# Patient Record
Sex: FEMALE | Race: BLACK OR AFRICAN AMERICAN | NOT HISPANIC OR LATINO | Employment: OTHER | URBAN - METROPOLITAN AREA
[De-identification: names, ages, dates, MRNs, and addresses within clinical notes are randomized per-mention and may not be internally consistent; named-entity substitution may affect disease eponyms.]

---

## 2022-07-13 ENCOUNTER — HOSPITAL ENCOUNTER (EMERGENCY)
Facility: OTHER | Age: 57
Discharge: HOME | End: 2022-07-13
Attending: EMERGENCY MEDICINE
Payer: MEDICAID

## 2022-07-13 VITALS
BODY MASS INDEX: 34.86 KG/M2 | SYSTOLIC BLOOD PRESSURE: 174 MMHG | HEART RATE: 92 BPM | WEIGHT: 230 LBS | DIASTOLIC BLOOD PRESSURE: 82 MMHG | OXYGEN SATURATION: 98 % | HEIGHT: 68 IN | TEMPERATURE: 98 F | RESPIRATION RATE: 17 BRPM

## 2022-07-13 DIAGNOSIS — N83.202 CYST OF LEFT OVARY: ICD-10-CM

## 2022-07-13 DIAGNOSIS — R74.8 ELEVATED ALKALINE PHOSPHATASE LEVEL: ICD-10-CM

## 2022-07-13 DIAGNOSIS — R11.2 NAUSEA AND VOMITING, INTRACTABILITY OF VOMITING NOT SPECIFIED, UNSPECIFIED VOMITING TYPE: Primary | ICD-10-CM

## 2022-07-13 DIAGNOSIS — R16.1 SPLENOMEGALY: ICD-10-CM

## 2022-07-13 LAB
ALBUMIN SERPL BCP-MCNC: 3.7 G/DL (ref 3.5–5.2)
ALP SERPL-CCNC: 160 U/L (ref 55–135)
ALT SERPL W/O P-5'-P-CCNC: 43 U/L (ref 10–44)
ANION GAP SERPL CALC-SCNC: 17 MMOL/L (ref 8–16)
AST SERPL-CCNC: 43 U/L (ref 10–40)
BACTERIA #/AREA URNS HPF: NORMAL /HPF
BASOPHILS # BLD AUTO: 0.02 K/UL (ref 0–0.2)
BASOPHILS NFR BLD: 0.2 % (ref 0–1.9)
BILIRUB SERPL-MCNC: 1 MG/DL (ref 0.1–1)
BILIRUB UR QL STRIP: NEGATIVE
BUN SERPL-MCNC: 13 MG/DL (ref 6–20)
CALCIUM SERPL-MCNC: 9.7 MG/DL (ref 8.7–10.5)
CHLORIDE SERPL-SCNC: 105 MMOL/L (ref 95–110)
CLARITY UR: CLEAR
CO2 SERPL-SCNC: 20 MMOL/L (ref 23–29)
COLOR UR: YELLOW
CREAT SERPL-MCNC: 0.7 MG/DL (ref 0.5–1.4)
CREAT SERPL-MCNC: 0.9 MG/DL (ref 0.5–1.4)
DIFFERENTIAL METHOD: ABNORMAL
EOSINOPHIL # BLD AUTO: 0.2 K/UL (ref 0–0.5)
EOSINOPHIL NFR BLD: 1.4 % (ref 0–8)
ERYTHROCYTE [DISTWIDTH] IN BLOOD BY AUTOMATED COUNT: 13.3 % (ref 11.5–14.5)
EST. GFR  (AFRICAN AMERICAN): >60 ML/MIN/1.73 M^2
EST. GFR  (NON AFRICAN AMERICAN): >60 ML/MIN/1.73 M^2
GLUCOSE SERPL-MCNC: 108 MG/DL (ref 70–110)
GLUCOSE UR QL STRIP: NEGATIVE
HCT VFR BLD AUTO: 37.8 % (ref 37–48.5)
HGB BLD-MCNC: 12.8 G/DL (ref 12–16)
HGB UR QL STRIP: NEGATIVE
IMM GRANULOCYTES # BLD AUTO: 0.04 K/UL (ref 0–0.04)
IMM GRANULOCYTES NFR BLD AUTO: 0.4 % (ref 0–0.5)
KETONES UR QL STRIP: NEGATIVE
LEUKOCYTE ESTERASE UR QL STRIP: ABNORMAL
LIPASE SERPL-CCNC: 32 U/L (ref 4–60)
LYMPHOCYTES # BLD AUTO: 1.6 K/UL (ref 1–4.8)
LYMPHOCYTES NFR BLD: 15.1 % (ref 18–48)
MCH RBC QN AUTO: 32.5 PG (ref 27–31)
MCHC RBC AUTO-ENTMCNC: 33.9 G/DL (ref 32–36)
MCV RBC AUTO: 96 FL (ref 82–98)
MICROSCOPIC COMMENT: NORMAL
MONOCYTES # BLD AUTO: 0.4 K/UL (ref 0.3–1)
MONOCYTES NFR BLD: 3.6 % (ref 4–15)
NEUTROPHILS # BLD AUTO: 8.4 K/UL (ref 1.8–7.7)
NEUTROPHILS NFR BLD: 79.3 % (ref 38–73)
NITRITE UR QL STRIP: NEGATIVE
NRBC BLD-RTO: 0 /100 WBC
PH UR STRIP: 6 [PH] (ref 5–8)
PLATELET # BLD AUTO: 223 K/UL (ref 150–450)
PMV BLD AUTO: 11 FL (ref 9.2–12.9)
POTASSIUM SERPL-SCNC: 4.3 MMOL/L (ref 3.5–5.1)
PROT SERPL-MCNC: 8.8 G/DL (ref 6–8.4)
PROT UR QL STRIP: NEGATIVE
RBC # BLD AUTO: 3.94 M/UL (ref 4–5.4)
SAMPLE: NORMAL
SODIUM SERPL-SCNC: 142 MMOL/L (ref 136–145)
SP GR UR STRIP: 1.01 (ref 1–1.03)
URN SPEC COLLECT METH UR: ABNORMAL
UROBILINOGEN UR STRIP-ACNC: 1 EU/DL
WBC # BLD AUTO: 10.53 K/UL (ref 3.9–12.7)
WBC #/AREA URNS HPF: 5 /HPF (ref 0–5)

## 2022-07-13 PROCEDURE — 80053 COMPREHEN METABOLIC PANEL: CPT | Performed by: NURSE PRACTITIONER

## 2022-07-13 PROCEDURE — 96375 TX/PRO/DX INJ NEW DRUG ADDON: CPT

## 2022-07-13 PROCEDURE — 83690 ASSAY OF LIPASE: CPT | Performed by: NURSE PRACTITIONER

## 2022-07-13 PROCEDURE — 25000003 PHARM REV CODE 250: Performed by: NURSE PRACTITIONER

## 2022-07-13 PROCEDURE — 63600175 PHARM REV CODE 636 W HCPCS: Performed by: NURSE PRACTITIONER

## 2022-07-13 PROCEDURE — 99284 EMERGENCY DEPT VISIT MOD MDM: CPT | Mod: 25

## 2022-07-13 PROCEDURE — 85025 COMPLETE CBC W/AUTO DIFF WBC: CPT | Performed by: NURSE PRACTITIONER

## 2022-07-13 PROCEDURE — 81000 URINALYSIS NONAUTO W/SCOPE: CPT | Performed by: NURSE PRACTITIONER

## 2022-07-13 PROCEDURE — 96374 THER/PROPH/DIAG INJ IV PUSH: CPT | Mod: 59

## 2022-07-13 PROCEDURE — 96361 HYDRATE IV INFUSION ADD-ON: CPT

## 2022-07-13 PROCEDURE — 25500020 PHARM REV CODE 255: Performed by: EMERGENCY MEDICINE

## 2022-07-13 RX ORDER — ONDANSETRON 4 MG/1
4 TABLET, FILM COATED ORAL EVERY 6 HOURS
Qty: 12 TABLET | Refills: 0 | Status: SHIPPED | OUTPATIENT
Start: 2022-07-13 | End: 2022-10-19

## 2022-07-13 RX ORDER — FAMOTIDINE 10 MG/ML
20 INJECTION INTRAVENOUS
Status: COMPLETED | OUTPATIENT
Start: 2022-07-13 | End: 2022-07-13

## 2022-07-13 RX ORDER — ONDANSETRON 2 MG/ML
4 INJECTION INTRAMUSCULAR; INTRAVENOUS
Status: COMPLETED | OUTPATIENT
Start: 2022-07-13 | End: 2022-07-13

## 2022-07-13 RX ORDER — KETOROLAC TROMETHAMINE 30 MG/ML
15 INJECTION, SOLUTION INTRAMUSCULAR; INTRAVENOUS
Status: COMPLETED | OUTPATIENT
Start: 2022-07-13 | End: 2022-07-13

## 2022-07-13 RX ADMIN — IOHEXOL 100 ML: 350 INJECTION, SOLUTION INTRAVENOUS at 06:07

## 2022-07-13 RX ADMIN — KETOROLAC TROMETHAMINE 15 MG: 30 INJECTION, SOLUTION INTRAMUSCULAR; INTRAVENOUS at 06:07

## 2022-07-13 RX ADMIN — FAMOTIDINE 20 MG: 10 INJECTION INTRAVENOUS at 06:07

## 2022-07-13 RX ADMIN — SODIUM CHLORIDE 1000 ML: 0.9 INJECTION, SOLUTION INTRAVENOUS at 06:07

## 2022-07-13 RX ADMIN — ONDANSETRON 4 MG: 2 INJECTION INTRAMUSCULAR; INTRAVENOUS at 06:07

## 2022-07-13 NOTE — ED PROVIDER NOTES
Encounter Date: 7/13/2022       History     Chief Complaint   Patient presents with    Nausea    Vomiting     Pt c.o nausea and vomiting onset 2 weeks ago. Pt states knot feeling in right upper abd.  Vomit and diarrhea x 1 today.  AAO x 3 nadn skin w.d Tongue slightly dry     57 y/o female with hypothyroidism, psoriatic arthritis, Type 2 DM and HTN which presents to the ED with N/V and RUQ pain for the past 2 weeks. She initially thought it was a new medication she was prescribed but she continues to have the problem after stopping the medication. Pt denies fevers.     The history is provided by the patient.     Review of patient's allergies indicates:  No Known Allergies  No past medical history on file.  No past surgical history on file.  No family history on file.     Review of Systems   Constitutional: Negative for fever.   HENT: Negative for sore throat.    Respiratory: Negative for shortness of breath.    Cardiovascular: Negative for chest pain.   Gastrointestinal: Positive for abdominal pain, diarrhea, nausea and vomiting.   Genitourinary: Negative for dysuria.   Musculoskeletal: Negative for back pain.   Skin: Negative for rash.   Neurological: Negative for weakness.   Hematological: Does not bruise/bleed easily.   All other systems reviewed and are negative.    Physical Exam     Initial Vitals [07/13/22 1744]   BP Pulse Resp Temp SpO2   (!) 185/89 107 18 98.1 °F (36.7 °C) 98 %      MAP       --         Physical Exam    Nursing note and vitals reviewed.  Constitutional: She appears well-developed and well-nourished.   HENT:   Head: Normocephalic and atraumatic.   Eyes: Conjunctivae and EOM are normal. Pupils are equal, round, and reactive to light.   Neck:   Normal range of motion.  Cardiovascular: Normal rate, regular rhythm, normal heart sounds and intact distal pulses. Exam reveals no gallop and no friction rub.    No murmur heard.  Pulmonary/Chest: Breath sounds normal. No respiratory distress. She has  no wheezes. She has no rhonchi. She has no rales. She exhibits no tenderness.   Abdominal: Abdomen is soft. Bowel sounds are normal. She exhibits no distension and no mass. There is abdominal tenderness in the right upper quadrant. There is no rebound and no guarding.   Musculoskeletal:         General: No tenderness or edema. Normal range of motion.      Cervical back: Normal range of motion.     Neurological: She is alert and oriented to person, place, and time. She has normal strength. GCS score is 15. GCS eye subscore is 4. GCS verbal subscore is 5. GCS motor subscore is 6.   Skin: Skin is warm. Capillary refill takes less than 2 seconds.       ED Course   Procedures  Labs Reviewed   CBC W/ AUTO DIFFERENTIAL - Abnormal; Notable for the following components:       Result Value    RBC 3.94 (*)     MCH 32.5 (*)     Gran # (ANC) 8.4 (*)     Gran % 79.3 (*)     Lymph % 15.1 (*)     Mono % 3.6 (*)     All other components within normal limits   COMPREHENSIVE METABOLIC PANEL - Abnormal; Notable for the following components:    CO2 20 (*)     Total Protein 8.8 (*)     Alkaline Phosphatase 160 (*)     AST 43 (*)     Anion Gap 17 (*)     All other components within normal limits   URINALYSIS, REFLEX TO URINE CULTURE - Abnormal; Notable for the following components:    Leukocytes, UA Trace (*)     All other components within normal limits    Narrative:     Specimen Source->Urine   LIPASE   URINALYSIS MICROSCOPIC    Narrative:     Specimen Source->Urine   ISTAT CREATININE          Imaging Results          CT Abdomen Pelvis With Contrast (Final result)  Result time 07/13/22 19:06:59    Final result by Christy Ricketts MD (07/13/22 19:06:59)                 Impression:      1. No acute intra-abdominal abnormalities identified.  2. Large 10 cm cystic lesion seen within the pelvis which appears to arise from the left ovary/adnexal region.  Potential ovarian neoplastic process to be considered.  No prior studies are available  for comparison.  Ob gyn referral and future follow-up are advised.  3. Diffuse nodular contour of the liver which can be seen in the setting of cirrhosis.  Clinical correlation is needed.  4. Splenomegaly.  5. Colonic diverticulosis with no evidence of acute diverticulitis.  6. Additional findings as detailed above.      Electronically signed by: Christy Ricketts MD  Date:    07/13/2022  Time:    19:06             Narrative:    EXAMINATION:  CT ABDOMEN PELVIS WITH CONTRAST    CLINICAL HISTORY:  Nausea/vomiting;    TECHNIQUE:  Low dose axial images, sagittal and coronal reformations were obtained from the lung bases to the pubic symphysis following the IV administration of 100 mL of Omnipaque 350 .  Oral contrast was not given.    COMPARISON:  None.    FINDINGS:  The visualized portion of the heart is unremarkable.  The lung bases are clear.    There is diffuse nodular contour of the liver which can be seen with underlying cirrhosis.  Spleen is enlarged measuring 15 cm.  Portal vein and splenic vein are patent.  There is no intra-or extrahepatic biliary ductal dilatation.  Gallbladder is contracted with small calcified stones seen.  Stomach, pancreas, and adrenal glands are unremarkable.    Kidneys enhance normally with no evidence of hydronephrosis.  No abnormalities are seen along the ureteral courses.  Urinary bladder is nondistended.  Uterus is unremarkable.  Large 10 cm cystic lesion is seen within the pelvis which may arise from the left ovary.    Appendix is visualized and is unremarkable.  The visualized loops of small and large bowel show no evidence of obstruction or inflammation.  Diverticulosis is seen involving the distal descending and proximal sigmoid colon.  No free air or free fluid.    Aorta tapers normally mild atherosclerosis.    No acute osseous abnormality identified.  Mild levoscoliotic curvature is seen of the thoracolumbar spine.  Multilevel degenerative changes are seen with lower lumbar facet  arthropathy.  Small fat containing umbilical hernia is noted.                                 Medications   sodium chloride 0.9% bolus 1,000 mL (0 mLs Intravenous Stopped 7/13/22 1930)   ondansetron injection 4 mg (4 mg Intravenous Given 7/13/22 1831)   ketorolac injection 15 mg (15 mg Intravenous Given 7/13/22 1831)   famotidine (PF) injection 20 mg (20 mg Intravenous Given 7/13/22 1833)   iohexoL (OMNIPAQUE 350) injection 100 mL (100 mLs Intravenous Given 7/13/22 1854)     Medical Decision Making:   Initial Assessment:   57 y/o female with RUQ pain and N/V/D for 2 weeks. Labs and CT ordered.  Differential Diagnosis:   Gastroenteritis, gastritis, ulcer, cholecystitis, gallstones, pancreatitis, ileus, small bowel obstruction, appendicitis, constipation  Clinical Tests:   Lab Tests: Ordered and Reviewed  The following lab test(s) were unremarkable: CBC and Lipase       <> Summary of Lab: Elevated AST and alkaline phos  Radiological Study: Ordered and Reviewed  ED Management:  Pt examined and has pain with palpation to the RUQ of the abdomen. Pt given zofran, toradol and fluids. CT shows splenomegaly, possible early signs of cirrhosis and left ovarian cyst. Pt advised that the CT findings can be followed up outpatient and she agreed. Pt felt much better after the zofran and will be discharged home with zofran. A referral has been placed for gynecology and gastroenterology. She will follow up with her PCP. Patient given strict return precautions and voiced understanding of all discharge instructions. Pt was stable at discharge.                  ED Course as of 07/13/22 2009 Wed Jul 13, 2022 1821 BP(!): 185/89 [AT]   1821 Temp src: Oral [AT]   1821 Temp: 98.1 °F (36.7 °C) [AT]   1821 Pulse: 107 [AT]   1821 Resp: 18 [AT]   1821 SpO2: 98 % [AT]      ED Course User Index  [AT] MAURICIO Zee             Clinical Impression:   Final diagnoses:  [R11.2] Nausea and vomiting, intractability of vomiting not  specified, unspecified vomiting type (Primary)  [R16.1] Splenomegaly  [R74.8] Elevated alkaline phosphatase level  [N83.202] Cyst of left ovary          ED Disposition Condition    Discharge Stable        ED Prescriptions     Medication Sig Dispense Start Date End Date Auth. Provider    ondansetron (ZOFRAN) 4 MG tablet Take 1 tablet (4 mg total) by mouth every 6 (six) hours. 12 tablet 7/13/2022  MAURICIO Zee        Follow-up Information     Follow up With Specialties Details Why Contact Info    primary care provider as needed  Schedule an appointment as soon as possible for a visit in 1 week      [facility] Obstetrics and Gynecology Schedule an appointment as soon as possible for a visit   [address] [place] [id number]  [phone]    [facility] Gastroenterology Schedule an appointment as soon as possible for a visit in 1 week  [address] [place] [address]  [phone]           MAURICIO Zee  07/13/22 2009

## 2022-07-14 NOTE — DISCHARGE INSTRUCTIONS
No contact sports or climbing on ladders  If you begin to have excessive bleeding please return to the ED

## 2022-07-21 ENCOUNTER — HOSPITAL ENCOUNTER (OUTPATIENT)
Dept: RADIOLOGY | Facility: OTHER | Age: 57
Discharge: HOME | End: 2022-07-21
Attending: NURSE PRACTITIONER
Payer: MEDICAID

## 2022-07-21 DIAGNOSIS — N83.202 CYST OF LEFT OVARY: ICD-10-CM

## 2022-07-21 PROCEDURE — 76856 US PELVIS COMP WITH TRANSVAG NON-OB (XPD): ICD-10-PCS | Mod: 26,,, | Performed by: RADIOLOGY

## 2022-07-21 PROCEDURE — 76830 TRANSVAGINAL US NON-OB: CPT | Mod: TC

## 2022-07-21 PROCEDURE — 76830 TRANSVAGINAL US NON-OB: CPT | Mod: 26,,, | Performed by: RADIOLOGY

## 2022-07-21 PROCEDURE — 76830 US PELVIS COMP WITH TRANSVAG NON-OB (XPD): ICD-10-PCS | Mod: 26,,, | Performed by: RADIOLOGY

## 2022-07-21 PROCEDURE — 76856 US EXAM PELVIC COMPLETE: CPT | Mod: 26,,, | Performed by: RADIOLOGY

## 2022-08-02 ENCOUNTER — TELEPHONE (OUTPATIENT)
Dept: INTERNAL MEDICINE | Facility: CLINIC | Age: 57
End: 2022-08-02
Payer: MEDICAID

## 2022-08-02 NOTE — TELEPHONE ENCOUNTER
----- Message from Monica García sent at 8/2/2022 10:20 AM CDT -----  Contact: [phone]  Brittani from [employer] called to inquire about this pt listing the provider as her PCP. Pt was told providers panel was full, but advised that the pt says that the provider said she would accept her. Please Advise

## 2022-08-02 NOTE — TELEPHONE ENCOUNTER
Spoke with pt and was able to get her scheduled for appt on 8/10/22 for 11:30 am.    Reminder printed and mailed.

## 2022-08-09 PROBLEM — E89.0 POSTOPERATIVE HYPOTHYROIDISM: Status: ACTIVE | Noted: 2020-05-05

## 2022-08-09 PROBLEM — E66.812 CLASS 2 SEVERE OBESITY DUE TO EXCESS CALORIES WITH SERIOUS COMORBIDITY AND BODY MASS INDEX (BMI) OF 39.0 TO 39.9 IN ADULT: Status: ACTIVE | Noted: 2020-05-05

## 2022-08-09 PROBLEM — M75.51 SUBACROMIAL BURSITIS OF RIGHT SHOULDER JOINT: Status: ACTIVE | Noted: 2020-07-02

## 2022-08-09 PROBLEM — E66.01 CLASS 2 SEVERE OBESITY DUE TO EXCESS CALORIES WITH SERIOUS COMORBIDITY AND BODY MASS INDEX (BMI) OF 39.0 TO 39.9 IN ADULT: Status: ACTIVE | Noted: 2020-05-05

## 2022-08-09 PROBLEM — E11.9 TYPE 2 DIABETES MELLITUS WITHOUT COMPLICATION, WITHOUT LONG-TERM CURRENT USE OF INSULIN: Status: ACTIVE | Noted: 2020-05-05

## 2022-08-09 PROBLEM — M17.0 PRIMARY OSTEOARTHRITIS OF BOTH KNEES: Status: ACTIVE | Noted: 2020-05-05

## 2022-08-09 PROBLEM — L40.0 PLAQUE PSORIASIS: Status: ACTIVE | Noted: 2020-05-05

## 2022-08-09 RX ORDER — METHOTREXATE 2.5 MG/1
20 TABLET ORAL
COMMUNITY
Start: 2022-05-30 | End: 2022-11-23

## 2022-08-09 RX ORDER — CELECOXIB 200 MG/1
200 CAPSULE ORAL DAILY
COMMUNITY
Start: 2022-05-30 | End: 2022-11-23

## 2022-08-09 RX ORDER — FOLIC ACID 1 MG/1
1000 TABLET ORAL DAILY
COMMUNITY
Start: 2022-05-30 | End: 2023-12-19

## 2022-08-09 RX ORDER — ERGOCALCIFEROL 1.25 MG/1
50000 CAPSULE ORAL
COMMUNITY
Start: 2022-04-21 | End: 2022-11-23

## 2022-08-09 RX ORDER — METFORMIN HYDROCHLORIDE 500 MG/1
1000 TABLET, EXTENDED RELEASE ORAL 2 TIMES DAILY WITH MEALS
COMMUNITY
Start: 2022-06-08 | End: 2023-06-21

## 2022-08-09 RX ORDER — METFORMIN HYDROCHLORIDE 1000 MG/1
1000 TABLET ORAL 2 TIMES DAILY
COMMUNITY
Start: 2022-04-21 | End: 2022-08-10

## 2022-08-09 RX ORDER — ATORVASTATIN CALCIUM 40 MG/1
40 TABLET, FILM COATED ORAL DAILY
COMMUNITY
Start: 2022-06-08

## 2022-08-09 RX ORDER — SEMAGLUTIDE 1.34 MG/ML
INJECTION, SOLUTION SUBCUTANEOUS
COMMUNITY
Start: 2022-06-14 | End: 2022-10-11

## 2022-08-09 RX ORDER — ONDANSETRON 4 MG/1
TABLET, FILM COATED ORAL 2 TIMES DAILY
COMMUNITY
Start: 2022-07-13 | End: 2023-06-21

## 2022-08-09 RX ORDER — LOSARTAN POTASSIUM 100 MG/1
100 TABLET ORAL DAILY
COMMUNITY
Start: 2022-06-08 | End: 2022-09-21

## 2022-08-09 RX ORDER — LEVOTHYROXINE SODIUM 137 UG/1
137 TABLET ORAL DAILY
COMMUNITY
Start: 2022-06-08 | End: 2022-09-21 | Stop reason: SDUPTHER

## 2022-08-09 NOTE — PROGRESS NOTES
Subjective:     @Patient ID: Parris Guevara is a 57 y.o. female.    Chief Complaint: Annual Exam    HPI  58 yo F with HTN, DM2, HLD, psoriatic arthritis, Grave's disease hypothyroidism s/p partial thyroidectomy at age 9 yo, presents for annual and to est care at [facility].   She is new to me as a patient. Here to       1. DM2: last a1c 7.9 On metformin  mg bid and ozempic. Foot exam: due  Eye exam: due   Reports some nausea not related she feels to medication  2. HTN: losartan 100 mg qday.   Reports amlodpine 5 mg takes prn. States a full tablet makes her feel sick. Will take 1/2 tablet. Reports BP usually 120-130/70s home.   3. HLD: atorvastatin 40 mg qday  4. Ovarian mass: diagnosed on CT scan in ER. Also had ultrasound. Needs f/u. Has upcoming appt on  with GYN   5. Liver nodular contour, possibly cirrhosis: seen on CT scan in ER in July. Recommend f/u. Pt Reports her mother  of CARTER cirrhosis, liver cancer   5. Psoriatic arthritis: on methotrexate, folic acid 1 mg qday and Celebrex. Follows with rheumatology at [facility] Dr Thao Haynes.    6. Hypothyroidism s/p partial thyroidectomy: on synthroid 137 mcg qday  7. Obesity: pt limited with activity with hx of arthritis   8. N/V: had episode in the ER. Occurs with RUQ abdominal pain intermittently associated after eating heavy meals. CT scan on 7/15 showed small calcified gallstones         Lipid disorders/ASCVD risk (ages >/= 45 or >/= 20 if increased risk ): ordered  DM (>45y yearly or if obese, HTN): A1c ordered     Eye exam: reports done 6 months ago   Breast Cancer (40-50y discretion of pt, 50-74y every 1-2 years): Mammogram DUE   Cervical Cancer (Pap Smear ages 21-65 every 3 years or Pap + HPV q5 years after 30 years of age): DUE  Colorectal Cancer (normal risk 50-75yr): Colonoscopy DUE    DEXA (F>66 yo, M >71yo, M&F 50-70 yo with risk factors (smoking, previous fx, wt <70kg; etoh abuse, chronic steroids, RA)): n/a       Vaccines:    Influenza (yearly)    Tetanus (every 10 yrs - 1st tdap)    PPSV23(>66yo or <65 w/ lung dz, smoking, DM)   PCV13 (> 65 or <65 w/ immunocompromised)   Zoster (>61yo): defer     Covid19:  Due for booster     Exercise: no scheduled activity   Diet: regular                  Review of Systems   Constitutional: Negative for chills and fever.   HENT: Negative for congestion and sore throat.    Eyes: Negative for pain and visual disturbance.   Respiratory: Negative for cough and shortness of breath.    Cardiovascular: Negative for chest pain and leg swelling.   Gastrointestinal: Positive for nausea. Negative for abdominal pain and vomiting.   Endocrine: Negative for polydipsia and polyuria.   Genitourinary: Negative for difficulty urinating and dysuria.   Musculoskeletal: Positive for arthralgias. Negative for back pain.   Skin: Negative for rash and wound.   Neurological: Negative for dizziness, weakness and headaches.   Psychiatric/Behavioral: Negative for agitation and confusion.     Past medical history, surgical history, and family medical history reviewed and updated as appropriate.    Medications and allergies reviewed.     Objective:     There were no vitals filed for this visit.  There is no height or weight on file to calculate BMI.  Physical Exam  Vitals reviewed.   Constitutional:       General: She is not in acute distress.     Appearance: She is well-developed.   HENT:      Head: Normocephalic and atraumatic.      Right Ear: Tympanic membrane normal.      Left Ear: Tympanic membrane normal.      Mouth/Throat:      Mouth: Mucous membranes are moist.      Pharynx: No oropharyngeal exudate.   Eyes:      General:         Right eye: No discharge.         Left eye: No discharge.      Conjunctiva/sclera: Conjunctivae normal.   Neck:      Comments: Old healed thyroid scar from partial thyroidectomy  Cardiovascular:      Rate and Rhythm: Normal rate and regular rhythm.      Heart sounds: No murmur heard.    No friction rub.   Pulmonary:       Effort: Pulmonary effort is normal.      Breath sounds: Normal breath sounds.   Abdominal:      General: Bowel sounds are normal. There is no distension.      Palpations: Abdomen is soft.      Tenderness: There is no abdominal tenderness. There is no guarding.   Musculoskeletal:         General: Normal range of motion.      Cervical back: Normal range of motion and neck supple.      Right lower leg: No edema.      Left lower leg: No edema.   Lymphadenopathy:      Cervical: No cervical adenopathy.   Skin:     General: Skin is warm and dry.   Neurological:      Mental Status: She is alert and oriented to person, place, and time.   Psychiatric:         Mood and Affect: Mood normal.         Behavior: Behavior normal.         Lab Results   Component Value Date    CHOL 217 (H) 09/18/2020    TRIG 138 09/18/2020    HDL 41 09/18/2020    HGBA1C 7.9 (H) 04/28/2021       CT abdomen/pelvis 7/13/22  EXAMINATION:  CT ABDOMEN PELVIS WITH CONTRAST      CLINICAL HISTORY:  Nausea/vomiting;     TECHNIQUE:  Low dose axial images, sagittal and coronal reformations were obtained from the lung bases to the pubic symphysis following the IV administration of 100 mL of Omnipaque 350 .  Oral contrast was not given.     COMPARISON:  None.     FINDINGS:  The visualized portion of the heart is unremarkable.  The lung bases are clear.     There is diffuse nodular contour of the liver which can be seen with underlying cirrhosis.  Spleen is enlarged measuring 15 cm.  Portal vein and splenic vein are patent.  There is no intra-or extrahepatic biliary ductal dilatation.  Gallbladder is contracted with small calcified stones seen.  Stomach, pancreas, and adrenal glands are unremarkable.     Kidneys enhance normally with no evidence of hydronephrosis.  No abnormalities are seen along the ureteral courses.  Urinary bladder is nondistended.  Uterus is unremarkable.  Large 10 cm cystic lesion is seen within the pelvis which may arise from the left  ovary.     Appendix is visualized and is unremarkable.  The visualized loops of small and large bowel show no evidence of obstruction or inflammation.  Diverticulosis is seen involving the distal descending and proximal sigmoid colon.  No free air or free fluid.     Aorta tapers normally mild atherosclerosis.     No acute osseous abnormality identified.  Mild levoscoliotic curvature is seen of the thoracolumbar spine.  Multilevel degenerative changes are seen with lower lumbar facet arthropathy.  Small fat containing umbilical hernia is noted.     Impression:     1. No acute intra-abdominal abnormalities identified.  2. Large 10 cm cystic lesion seen within the pelvis which appears to arise from the left ovary/adnexal region.  Potential ovarian neoplastic process to be considered.  No prior studies are available for comparison.  Ob gyn referral and future follow-up are advised.  3. Diffuse nodular contour of the liver which can be seen in the setting of cirrhosis.  Clinical correlation is needed.  4. Splenomegaly.  5. Colonic diverticulosis with no evidence of acute diverticulitis.  6. Additional findings as detailed above.           US Pelvis 7/21/22     EXAMINATION:  US PELVIS COMP WITH TRANSVAG NON-OB (XPD)     CLINICAL HISTORY:  Unspecified ovarian cyst, left side     TECHNIQUE:  Transabdominal sonography of the pelvis was performed, followed by transvaginal sonography to better evaluate the uterus and ovaries.     COMPARISON:  None.     FINDINGS:  Uterus:     Size: 5.9 x 3.6 x 4.4 cm     The parenchyma is homogeneous.  Endometrium is mildly thickened measuring 0.6 cm.     Right ovary:     Not visualized     Left ovary:     Ovarian or adnexal cystic mass measures 8.9 cm.  Smooth, thin internal septations.  No associated vascularity on color images.     Free Fluid:     None.     Impression:     Large left ovarian or adnexal cystic mass with smooth internal septations.  Correlation with female pelvis MRI without  and with contrast may be considered in further evaluating.  Close follow-up recommended at a minimum as differential considerations include both benign and malignant neoplasm.     Mild diffuse thickening of the endometrium.  Correlation with endometrial sampling may be considered in further evaluating in this postmenopausal patient.  Assessment:     1. Annual physical exam    2. Type 2 diabetes mellitus without complication, without long-term current use of insulin    3. Hypertension associated with type 2 diabetes mellitus    4. Hyperlipidemia associated with type 2 diabetes mellitus    5. Postoperative hypothyroidism    6. Class 1 obesity due to excess calories with serious comorbidity and body mass index (BMI) of 33.0 to 33.9 in adult    7. Primary osteoarthritis of both knees    8. Psoriatic arthritis    9. Plaque psoriasis    10. Encounter for screening mammogram for breast cancer    11. Abnormal CT of liver    12. Elevated LFTs    13. Pelvic mass    14. Family history of liver cancer    15. Splenomegaly    16. Gallstones      Plan:   Tia was seen today for annual exam.    Diagnoses and all orders for this visit:    Annual physical exam  -     Comprehensive Metabolic Panel; Future  -     CBC Auto Differential; Future  -     Lipid Panel; Future  -     TSH; Future  -     Urinalysis; Future  -     Hemoglobin A1C; Future  -     Microalbumin/Creatinine Ratio, Urine; Future  -     Cancel: Hepatitis C Antibody; Future  -     Cancel: HIV 1/2 Ag/Ab (4th Gen); Future    Type 2 diabetes mellitus without complication, without long-term current use of insulin  -     Hemoglobin A1C; Future  -     Microalbumin/Creatinine Ratio, Urine; Future    Hypertension associated with type 2 diabetes mellitus  - BP elevated. Will continue losartan 100 mg qday and amlodipine 5 mg qday prn   - keep bp log and RTC in 1 month for bp check     Hyperlipidemia associated with type 2 diabetes mellitus  -     Lipid Panel; Future    Postoperative  hypothyroidism  - check tsh. Continue levothyroxine    Class 1 obesity due to excess calories with serious comorbidity and body mass index (BMI) of 33.0 to 33.9 in adult  - pt limited mobility 2/2 to arthritis. Is aware of following a healthy diet    Primary osteoarthritis of both knees  - stable. Continue to monitor     Psoriatic arthritis  -     Ambulatory referral/consult to Rheumatology; Future    Plaque psoriasis  -     Ambulatory referral/consult to Rheumatology; Future    Encounter for screening mammogram for breast cancer  -     Mammo Digital Screening Bilat w/ Jose; Future    Abnormal CT of liver  -     Ambulatory referral/consult to Hepatology; Future  -     Ambulatory referral/consult to Hepatology; Future    Elevated LFTs  -     Ambulatory referral/consult to Hepatology; Future  -     Ambulatory referral/consult to Hepatology; Future    Pelvic mass  - pt has upcoming appt on 8/30 with GYN     Family history of liver cancer  -     Ambulatory referral/consult to Hepatology; Future  -     Ambulatory referral/consult to Hepatology; Future    Splenomegaly  - no acute intervention at this time. Continue to monitor     Gallstones  - suspect symptomatic cholelithiasis. Will defer general surgery referral at this time. Revist at upcoming appt       RTC in 1 month for bp check     Reyna Duval MD  Internal Medicine    8/9/2022

## 2022-08-10 ENCOUNTER — LAB VISIT (OUTPATIENT)
Dept: LAB | Facility: HOSPITAL | Age: 57
End: 2022-08-10
Attending: HOSPITALIST
Payer: MEDICAID

## 2022-08-10 ENCOUNTER — OFFICE VISIT (OUTPATIENT)
Dept: INTERNAL MEDICINE | Facility: CLINIC | Age: 57
End: 2022-08-10
Payer: MEDICAID

## 2022-08-10 VITALS
SYSTOLIC BLOOD PRESSURE: 168 MMHG | TEMPERATURE: 98 F | HEART RATE: 88 BPM | HEIGHT: 68 IN | DIASTOLIC BLOOD PRESSURE: 80 MMHG | BODY MASS INDEX: 33.75 KG/M2 | WEIGHT: 222.69 LBS | RESPIRATION RATE: 20 BRPM | OXYGEN SATURATION: 99 %

## 2022-08-10 DIAGNOSIS — M17.0 PRIMARY OSTEOARTHRITIS OF BOTH KNEES: ICD-10-CM

## 2022-08-10 DIAGNOSIS — K80.20 GALLSTONES: ICD-10-CM

## 2022-08-10 DIAGNOSIS — E11.59 HYPERTENSION ASSOCIATED WITH TYPE 2 DIABETES MELLITUS: ICD-10-CM

## 2022-08-10 DIAGNOSIS — E66.09 CLASS 1 OBESITY DUE TO EXCESS CALORIES WITH SERIOUS COMORBIDITY AND BODY MASS INDEX (BMI) OF 33.0 TO 33.9 IN ADULT: ICD-10-CM

## 2022-08-10 DIAGNOSIS — L40.0 PLAQUE PSORIASIS: ICD-10-CM

## 2022-08-10 DIAGNOSIS — R79.89 ELEVATED LFTS: ICD-10-CM

## 2022-08-10 DIAGNOSIS — Z12.31 ENCOUNTER FOR SCREENING MAMMOGRAM FOR BREAST CANCER: ICD-10-CM

## 2022-08-10 DIAGNOSIS — R16.1 SPLENOMEGALY: ICD-10-CM

## 2022-08-10 DIAGNOSIS — E11.9 TYPE 2 DIABETES MELLITUS WITHOUT COMPLICATION, WITHOUT LONG-TERM CURRENT USE OF INSULIN: ICD-10-CM

## 2022-08-10 DIAGNOSIS — R19.00 PELVIC MASS: ICD-10-CM

## 2022-08-10 DIAGNOSIS — E11.69 HYPERLIPIDEMIA ASSOCIATED WITH TYPE 2 DIABETES MELLITUS: ICD-10-CM

## 2022-08-10 DIAGNOSIS — E89.0 POSTOPERATIVE HYPOTHYROIDISM: ICD-10-CM

## 2022-08-10 DIAGNOSIS — I15.2 HYPERTENSION ASSOCIATED WITH TYPE 2 DIABETES MELLITUS: ICD-10-CM

## 2022-08-10 DIAGNOSIS — Z00.00 ANNUAL PHYSICAL EXAM: Primary | ICD-10-CM

## 2022-08-10 DIAGNOSIS — R93.2 ABNORMAL CT OF LIVER: ICD-10-CM

## 2022-08-10 DIAGNOSIS — L40.50 PSORIATIC ARTHRITIS: ICD-10-CM

## 2022-08-10 DIAGNOSIS — Z80.0 FAMILY HISTORY OF LIVER CANCER: ICD-10-CM

## 2022-08-10 DIAGNOSIS — Z00.00 ANNUAL PHYSICAL EXAM: ICD-10-CM

## 2022-08-10 DIAGNOSIS — E78.5 HYPERLIPIDEMIA ASSOCIATED WITH TYPE 2 DIABETES MELLITUS: ICD-10-CM

## 2022-08-10 PROBLEM — E66.811 CLASS 1 OBESITY DUE TO EXCESS CALORIES WITH SERIOUS COMORBIDITY AND BODY MASS INDEX (BMI) OF 33.0 TO 33.9 IN ADULT: Status: ACTIVE | Noted: 2020-05-05

## 2022-08-10 LAB
ALBUMIN SERPL BCP-MCNC: 3.7 G/DL (ref 3.5–5.2)
ALP SERPL-CCNC: 150 U/L (ref 55–135)
ALT SERPL W/O P-5'-P-CCNC: 45 U/L (ref 10–44)
ANION GAP SERPL CALC-SCNC: 11 MMOL/L (ref 8–16)
AST SERPL-CCNC: 45 U/L (ref 10–40)
BASOPHILS # BLD AUTO: 0.06 K/UL (ref 0–0.2)
BASOPHILS NFR BLD: 0.7 % (ref 0–1.9)
BILIRUB SERPL-MCNC: 1.3 MG/DL (ref 0.1–1)
BUN SERPL-MCNC: 13 MG/DL (ref 6–20)
CALCIUM SERPL-MCNC: 9.2 MG/DL (ref 8.7–10.5)
CHLORIDE SERPL-SCNC: 107 MMOL/L (ref 95–110)
CHOLEST SERPL-MCNC: 134 MG/DL (ref 120–199)
CHOLEST/HDLC SERPL: 3.4 {RATIO} (ref 2–5)
CO2 SERPL-SCNC: 22 MMOL/L (ref 23–29)
CREAT SERPL-MCNC: 0.7 MG/DL (ref 0.5–1.4)
DIFFERENTIAL METHOD: ABNORMAL
EOSINOPHIL # BLD AUTO: 0.4 K/UL (ref 0–0.5)
EOSINOPHIL NFR BLD: 4.7 % (ref 0–8)
ERYTHROCYTE [DISTWIDTH] IN BLOOD BY AUTOMATED COUNT: 13.9 % (ref 11.5–14.5)
EST. GFR  (NO RACE VARIABLE): >60 ML/MIN/1.73 M^2
ESTIMATED AVG GLUCOSE: 117 MG/DL (ref 68–131)
GLUCOSE SERPL-MCNC: 83 MG/DL (ref 70–110)
HBA1C MFR BLD: 5.7 % (ref 4–5.6)
HCT VFR BLD AUTO: 34.8 % (ref 37–48.5)
HDLC SERPL-MCNC: 40 MG/DL (ref 40–75)
HDLC SERPL: 29.9 % (ref 20–50)
HGB BLD-MCNC: 11.3 G/DL (ref 12–16)
IMM GRANULOCYTES # BLD AUTO: 0.02 K/UL (ref 0–0.04)
IMM GRANULOCYTES NFR BLD AUTO: 0.2 % (ref 0–0.5)
LDLC SERPL CALC-MCNC: 76.8 MG/DL (ref 63–159)
LYMPHOCYTES # BLD AUTO: 2 K/UL (ref 1–4.8)
LYMPHOCYTES NFR BLD: 22.2 % (ref 18–48)
MCH RBC QN AUTO: 32.4 PG (ref 27–31)
MCHC RBC AUTO-ENTMCNC: 32.5 G/DL (ref 32–36)
MCV RBC AUTO: 100 FL (ref 82–98)
MONOCYTES # BLD AUTO: 0.3 K/UL (ref 0.3–1)
MONOCYTES NFR BLD: 3.3 % (ref 4–15)
NEUTROPHILS # BLD AUTO: 6.3 K/UL (ref 1.8–7.7)
NEUTROPHILS NFR BLD: 68.9 % (ref 38–73)
NONHDLC SERPL-MCNC: 94 MG/DL
NRBC BLD-RTO: 0 /100 WBC
PLATELET # BLD AUTO: 227 K/UL (ref 150–450)
PMV BLD AUTO: 12.1 FL (ref 9.2–12.9)
POTASSIUM SERPL-SCNC: 4.1 MMOL/L (ref 3.5–5.1)
PROT SERPL-MCNC: 7.9 G/DL (ref 6–8.4)
RBC # BLD AUTO: 3.49 M/UL (ref 4–5.4)
SODIUM SERPL-SCNC: 140 MMOL/L (ref 136–145)
T4 FREE SERPL-MCNC: 1.37 NG/DL (ref 0.71–1.51)
TRIGL SERPL-MCNC: 86 MG/DL (ref 30–150)
TSH SERPL DL<=0.005 MIU/L-ACNC: 0.3 UIU/ML (ref 0.4–4)
WBC # BLD AUTO: 9.1 K/UL (ref 3.9–12.7)

## 2022-08-10 PROCEDURE — 99386 PREV VISIT NEW AGE 40-64: CPT | Mod: S$PBB,,, | Performed by: HOSPITALIST

## 2022-08-10 PROCEDURE — 3079F DIAST BP 80-89 MM HG: CPT | Mod: CPTII,,, | Performed by: HOSPITALIST

## 2022-08-10 PROCEDURE — 84443 ASSAY THYROID STIM HORMONE: CPT | Performed by: HOSPITALIST

## 2022-08-10 PROCEDURE — 1159F MED LIST DOCD IN RCRD: CPT | Mod: CPTII,,, | Performed by: HOSPITALIST

## 2022-08-10 PROCEDURE — 1160F RVW MEDS BY RX/DR IN RCRD: CPT | Mod: CPTII,,, | Performed by: HOSPITALIST

## 2022-08-10 PROCEDURE — 4010F ACE/ARB THERAPY RXD/TAKEN: CPT | Mod: CPTII,,, | Performed by: HOSPITALIST

## 2022-08-10 PROCEDURE — 80053 COMPREHEN METABOLIC PANEL: CPT | Performed by: HOSPITALIST

## 2022-08-10 PROCEDURE — 85025 COMPLETE CBC W/AUTO DIFF WBC: CPT | Performed by: HOSPITALIST

## 2022-08-10 PROCEDURE — 99999 PR PBB SHADOW E&M-EST. PATIENT-LVL V: ICD-10-PCS | Mod: PBBFAC,,, | Performed by: HOSPITALIST

## 2022-08-10 PROCEDURE — 83036 HEMOGLOBIN GLYCOSYLATED A1C: CPT | Performed by: HOSPITALIST

## 2022-08-10 PROCEDURE — 99386 PR PREVENTIVE VISIT,NEW,40-64: ICD-10-PCS | Mod: S$PBB,,, | Performed by: HOSPITALIST

## 2022-08-10 PROCEDURE — 3077F SYST BP >= 140 MM HG: CPT | Mod: CPTII,,, | Performed by: HOSPITALIST

## 2022-08-10 PROCEDURE — 1160F PR REVIEW ALL MEDS BY PRESCRIBER/CLIN PHARMACIST DOCUMENTED: ICD-10-PCS | Mod: CPTII,,, | Performed by: HOSPITALIST

## 2022-08-10 PROCEDURE — 36415 COLL VENOUS BLD VENIPUNCTURE: CPT | Mod: PO | Performed by: HOSPITALIST

## 2022-08-10 PROCEDURE — 1159F PR MEDICATION LIST DOCUMENTED IN MEDICAL RECORD: ICD-10-PCS | Mod: CPTII,,, | Performed by: HOSPITALIST

## 2022-08-10 PROCEDURE — 99215 OFFICE O/P EST HI 40 MIN: CPT | Mod: PBBFAC,PO | Performed by: HOSPITALIST

## 2022-08-10 PROCEDURE — 3077F PR MOST RECENT SYSTOLIC BLOOD PRESSURE >= 140 MM HG: ICD-10-PCS | Mod: CPTII,,, | Performed by: HOSPITALIST

## 2022-08-10 PROCEDURE — 3079F PR MOST RECENT DIASTOLIC BLOOD PRESSURE 80-89 MM HG: ICD-10-PCS | Mod: CPTII,,, | Performed by: HOSPITALIST

## 2022-08-10 PROCEDURE — 99999 PR PBB SHADOW E&M-EST. PATIENT-LVL V: CPT | Mod: PBBFAC,,, | Performed by: HOSPITALIST

## 2022-08-10 PROCEDURE — 84439 ASSAY OF FREE THYROXINE: CPT | Performed by: HOSPITALIST

## 2022-08-10 PROCEDURE — 4010F PR ACE/ARB THEARPY RXD/TAKEN: ICD-10-PCS | Mod: CPTII,,, | Performed by: HOSPITALIST

## 2022-08-10 PROCEDURE — 80061 LIPID PANEL: CPT | Performed by: HOSPITALIST

## 2022-08-10 PROCEDURE — 3008F PR BODY MASS INDEX (BMI) DOCUMENTED: ICD-10-PCS | Mod: CPTII,,, | Performed by: HOSPITALIST

## 2022-08-10 PROCEDURE — 3008F BODY MASS INDEX DOCD: CPT | Mod: CPTII,,, | Performed by: HOSPITALIST

## 2022-08-11 ENCOUNTER — TELEPHONE (OUTPATIENT)
Dept: INTERNAL MEDICINE | Facility: CLINIC | Age: 57
End: 2022-08-11
Payer: MEDICAID

## 2022-08-11 NOTE — TELEPHONE ENCOUNTER
----- Message from Adry Lester sent at 8/11/2022  8:31 AM CDT -----  Regarding: returned call  Contact: patient [phone]  Patient is returning a phone call.  Who left a message for the patient: Carlee HUGHES  Does patient know what this is regarding:    Would you like a call back, or a response through your [facility] portal?:   Please call  Comments:

## 2022-08-30 ENCOUNTER — OFFICE VISIT (OUTPATIENT)
Dept: OBSTETRICS AND GYNECOLOGY | Facility: CLINIC | Age: 57
End: 2022-08-30
Payer: MEDICAID

## 2022-08-30 ENCOUNTER — TELEPHONE (OUTPATIENT)
Dept: GYNECOLOGIC ONCOLOGY | Facility: CLINIC | Age: 57
End: 2022-08-30
Payer: MEDICAID

## 2022-08-30 VITALS
WEIGHT: 223.56 LBS | BODY MASS INDEX: 33.88 KG/M2 | SYSTOLIC BLOOD PRESSURE: 156 MMHG | DIASTOLIC BLOOD PRESSURE: 90 MMHG | HEIGHT: 68 IN

## 2022-08-30 DIAGNOSIS — Z11.51 ENCOUNTER FOR SCREENING FOR HUMAN PAPILLOMAVIRUS (HPV): ICD-10-CM

## 2022-08-30 DIAGNOSIS — N83.8 OVARIAN MASS: ICD-10-CM

## 2022-08-30 DIAGNOSIS — N83.202 CYST OF LEFT OVARY: ICD-10-CM

## 2022-08-30 DIAGNOSIS — N83.202 CYST OF LEFT OVARY: Primary | ICD-10-CM

## 2022-08-30 DIAGNOSIS — Z12.4 CERVICAL CANCER SCREENING: ICD-10-CM

## 2022-08-30 DIAGNOSIS — Z01.419 WELL WOMAN EXAM WITH ROUTINE GYNECOLOGICAL EXAM: Primary | ICD-10-CM

## 2022-08-30 PROCEDURE — 1159F MED LIST DOCD IN RCRD: CPT | Mod: CPTII,,,

## 2022-08-30 PROCEDURE — 88175 CYTOPATH C/V AUTO FLUID REDO: CPT

## 2022-08-30 PROCEDURE — 3077F SYST BP >= 140 MM HG: CPT | Mod: CPTII,,,

## 2022-08-30 PROCEDURE — 99999 PR PBB SHADOW E&M-EST. PATIENT-LVL III: ICD-10-PCS | Mod: PBBFAC,,,

## 2022-08-30 PROCEDURE — 1160F RVW MEDS BY RX/DR IN RCRD: CPT | Mod: CPTII,,,

## 2022-08-30 PROCEDURE — 1159F PR MEDICATION LIST DOCUMENTED IN MEDICAL RECORD: ICD-10-PCS | Mod: CPTII,,,

## 2022-08-30 PROCEDURE — 99999 PR PBB SHADOW E&M-EST. PATIENT-LVL III: CPT | Mod: PBBFAC,,,

## 2022-08-30 PROCEDURE — 3080F PR MOST RECENT DIASTOLIC BLOOD PRESSURE >= 90 MM HG: ICD-10-PCS | Mod: CPTII,,,

## 2022-08-30 PROCEDURE — 3077F PR MOST RECENT SYSTOLIC BLOOD PRESSURE >= 140 MM HG: ICD-10-PCS | Mod: CPTII,,,

## 2022-08-30 PROCEDURE — 99213 OFFICE O/P EST LOW 20 MIN: CPT | Mod: PBBFAC

## 2022-08-30 PROCEDURE — 3080F DIAST BP >= 90 MM HG: CPT | Mod: CPTII,,,

## 2022-08-30 PROCEDURE — 1160F PR REVIEW ALL MEDS BY PRESCRIBER/CLIN PHARMACIST DOCUMENTED: ICD-10-PCS | Mod: CPTII,,,

## 2022-08-30 PROCEDURE — 87624 HPV HI-RISK TYP POOLED RSLT: CPT

## 2022-08-30 PROCEDURE — 99386 PR PREVENTIVE VISIT,NEW,40-64: ICD-10-PCS | Mod: S$PBB,,,

## 2022-08-30 PROCEDURE — 99386 PREV VISIT NEW AGE 40-64: CPT | Mod: S$PBB,,,

## 2022-08-30 PROCEDURE — 3008F BODY MASS INDEX DOCD: CPT | Mod: CPTII,,,

## 2022-08-30 PROCEDURE — 3008F PR BODY MASS INDEX (BMI) DOCUMENTED: ICD-10-PCS | Mod: CPTII,,,

## 2022-08-30 RX ORDER — METHOTREXATE 2.5 MG/1
TABLET ORAL
COMMUNITY
End: 2022-11-23

## 2022-08-30 RX ORDER — LEVOTHYROXINE SODIUM 25 UG/1
25 TABLET ORAL
COMMUNITY
End: 2022-09-21

## 2022-08-30 RX ORDER — ERGOCALCIFEROL 1.25 MG/1
50000 CAPSULE ORAL
COMMUNITY

## 2022-08-30 RX ORDER — LOSARTAN POTASSIUM 100 MG/1
100 TABLET ORAL DAILY
COMMUNITY
End: 2022-09-21

## 2022-08-30 RX ORDER — ATORVASTATIN CALCIUM 20 MG/1
20 TABLET, FILM COATED ORAL DAILY
COMMUNITY
End: 2022-11-23

## 2022-08-30 RX ORDER — FOLIC ACID 1 MG/1
1 TABLET ORAL DAILY
COMMUNITY
End: 2022-11-10 | Stop reason: SDUPTHER

## 2022-08-30 RX ORDER — SEMAGLUTIDE 1.34 MG/ML
INJECTION, SOLUTION SUBCUTANEOUS
COMMUNITY
End: 2022-10-11 | Stop reason: SDUPTHER

## 2022-08-30 RX ORDER — METFORMIN HYDROCHLORIDE 1000 MG/1
1000 TABLET ORAL 2 TIMES DAILY WITH MEALS
COMMUNITY
End: 2022-11-10 | Stop reason: SDUPTHER

## 2022-08-30 NOTE — PROGRESS NOTES
CC: Annual    HPI: Pt is a 57 y.o.  female who presents for routine annual exam and left ovarian cyst/mass. Pt states that she stopped getting periods about 10 years ago. Denies vaginal bleeding since then. Has hx of one vaginal delivery. Is not currently sexually active. She does not want STD screening. Pt went to ED in July for nausea, vomiting and RUQ pain. CT scan subsequently found a left ovarian mass. Pelvic ultrasound showed ovarian or adnexal cystic mass measures 8.9 cm. Pt denies pain at this time. Has appointment with rheumatology, hepatology and GI coming up.     FH:   Breast cancer: sister,  at age 49 and maternal cousin   Colon cancer: none  Ovarian cancer: none  Uterine cancer: none    Last pap smear: unknown, she thinks over 5 years ago  History of abnormal pap smears: none  Colonoscopy: has appointment in September   Mammogram: scheduled for September   STD history: none  Birth control: none  OB history:   Tobacco use: none     ROS:  GENERAL: Feeling well overall. Denies fever or chills.   SKIN: Denies rash or lesions.   HEAD: Denies head injury or headache.   NODES: Denies enlarged lymph nodes.   CHEST: Denies chest pain or shortness of breath.   CARDIOVASCULAR: Denies palpitations or left sided chest pain.   ABDOMEN: No abdominal pain, constipation, diarrhea, vomiting or rectal bleeding.   URINARY: No dysuria, hematuria, or burning on urination.  REPRODUCTIVE: See HPI.   BREASTS: Denies pain, lumps, or nipple discharge.   HEMATOLOGIC: No easy bruisability or excessive bleeding.   MUSCULOSKELETAL: Reports arthritis   NEUROLOGIC: Denies syncope or weakness.   PSYCHIATRIC: Denies depression, anxiety or mood swings.    PE: Chaperone present   APPEARANCE: Well nourished, well developed, Black or  female in no acute distress.  NODES: no cervical, supraclavicular, or inguinal lymphadenopathy  BREASTS: Symmetrical, no skin changes or visible lesions. No palpable masses, nipple  discharge or adenopathy bilaterally.  ABDOMEN: Soft. No tenderness or masses. No distention. No hernias palpated.   VULVA: No lesions. Normal external female genitalia.  URETHRAL MEATUS: Normal size and location, no lesions, no prolapse.  URETHRA: No masses, tenderness, or prolapse.  VAGINA: Moist. No lesions or lacerations noted. No abnormal discharge present. No odor present.   CERVIX: No lesions or discharge. No cervical motion tenderness.   UTERUS: Normal size, regular shape, mobile, non-tender.  ADNEXA: No tenderness.   ANUS PERINEUM: Normal.      Diagnosis:  1. Well woman exam with routine gynecological exam    2. Cyst of left ovary    3. Cervical cancer screening    4. Encounter for screening for human papillomavirus (HPV)    5. Ovarian mass        Plan:     Orders Placed This Encounter    HPV High Risk Genotypes, PCR    US Pelvis Comp with Transvag NON-OB (xpd    Ambulatory referral/consult to Gynecologic Oncology    Liquid-Based Pap Smear, Screening     - Pap and HPV updated   - Refer to GYN ONC for ovarian mass   - Repeat U/S ordered   - Pt interested in genetic testing due to family hx of breast cancer, reached out to Abiola García RN to get patient scheduled     Patient was counseled today on the new ACS guidelines for cervical cytology screening as well as the current recommendations for breast cancer screening. She was counseled to follow up with her PCP for other routine health maintenance. Counseling session lasted approximately 10 minutes, and all her questions were answered.  For women over the age of 65, you can stop having cervical cancer screenings if you have never had abnormal cervical cells or cervical cancer, and youve had three negative Pap tests in a row. (You also can stop screening if youve had two negative Pap and HPV tests in a row in the past 10 years, with at least one test in the past 5 years.    Follow-up with me PRN results, in 1 year for routine exam; pap in 3 years.      Lucinda Renteria, NP-C  Obstetrics & Gynecology

## 2022-08-30 NOTE — TELEPHONE ENCOUNTER
----- Message from Lucinda Renteria NP sent at 8/30/2022  2:10 PM CDT -----  Celeste Culver,    I saw this patient today who would be interested in genetic testing for breast cancer. She has a sister that passed away from breast cancer. She has not had genetic testing done.     Thanks,  Lucinda Renteria

## 2022-08-31 ENCOUNTER — TELEPHONE (OUTPATIENT)
Dept: GYNECOLOGIC ONCOLOGY | Facility: CLINIC | Age: 57
End: 2022-08-31
Payer: MEDICAID

## 2022-08-31 NOTE — TELEPHONE ENCOUNTER
Spoke with our patient about her insurance, schedule appointment she voiced understanding of the date, time and location. All questions answered Provider Scheduling Coord.  Gynecologic Oncology MA/PAR /Preceptor Aubrey Spann

## 2022-08-31 NOTE — TELEPHONE ENCOUNTER
----- Message from Gena Lara NP sent at 8/30/2022  3:21 PM CDT -----  Aubrey  Ms. Dias being referred for pelvic mass. I've ordered a  if you don't mind contacting her to schedule lab and consult with our next doc available.  Thank you,  Gena    ----- Message -----  From: Lucinda Renteria NP  Sent: 8/30/2022   2:05 PM CDT  To: CHAPINCITO Naidu,    I saw this patient today for an ovarian cyst/mass. She had a pelvic/ vag ultrasound in the ED in July showing a left ovarian or adnexal cystic mass measures 8.9 cm. I saw her as an annual visit today and am referring her to you all. I went ahead and ordered another pelvic/vag ultrasound. Is that appropriate or do you think there is anything else I should order? I wanted to reach out to you so she can get an appointment sooner rather than later.     Thanks!!  Lucinda Renteria

## 2022-09-02 ENCOUNTER — LAB VISIT (OUTPATIENT)
Dept: LAB | Facility: OTHER | Age: 57
End: 2022-09-02
Attending: NURSE PRACTITIONER
Payer: MEDICAID

## 2022-09-02 ENCOUNTER — TELEPHONE (OUTPATIENT)
Dept: GYNECOLOGIC ONCOLOGY | Facility: CLINIC | Age: 57
End: 2022-09-02

## 2022-09-02 ENCOUNTER — OFFICE VISIT (OUTPATIENT)
Dept: GYNECOLOGIC ONCOLOGY | Facility: CLINIC | Age: 57
End: 2022-09-02
Payer: MEDICAID

## 2022-09-02 ENCOUNTER — PATIENT MESSAGE (OUTPATIENT)
Dept: GYNECOLOGIC ONCOLOGY | Facility: CLINIC | Age: 57
End: 2022-09-02

## 2022-09-02 VITALS
HEIGHT: 67 IN | WEIGHT: 222.69 LBS | SYSTOLIC BLOOD PRESSURE: 178 MMHG | HEART RATE: 94 BPM | DIASTOLIC BLOOD PRESSURE: 96 MMHG | BODY MASS INDEX: 34.95 KG/M2

## 2022-09-02 DIAGNOSIS — N83.202 CYST OF LEFT OVARY: ICD-10-CM

## 2022-09-02 DIAGNOSIS — L40.0 PLAQUE PSORIASIS: ICD-10-CM

## 2022-09-02 DIAGNOSIS — N94.89 ADNEXAL MASS: Primary | ICD-10-CM

## 2022-09-02 DIAGNOSIS — Z80.3 FAMILY HISTORY OF BREAST CANCER: ICD-10-CM

## 2022-09-02 PROCEDURE — 99999 PR PBB SHADOW E&M-EST. PATIENT-LVL V: ICD-10-PCS | Mod: PBBFAC,,, | Performed by: OBSTETRICS & GYNECOLOGY

## 2022-09-02 PROCEDURE — 1159F PR MEDICATION LIST DOCUMENTED IN MEDICAL RECORD: ICD-10-PCS | Mod: CPTII,,, | Performed by: OBSTETRICS & GYNECOLOGY

## 2022-09-02 PROCEDURE — 3008F BODY MASS INDEX DOCD: CPT | Mod: CPTII,,, | Performed by: OBSTETRICS & GYNECOLOGY

## 2022-09-02 PROCEDURE — 36415 COLL VENOUS BLD VENIPUNCTURE: CPT | Performed by: NURSE PRACTITIONER

## 2022-09-02 PROCEDURE — 99215 OFFICE O/P EST HI 40 MIN: CPT | Mod: PBBFAC | Performed by: OBSTETRICS & GYNECOLOGY

## 2022-09-02 PROCEDURE — 1159F MED LIST DOCD IN RCRD: CPT | Mod: CPTII,,, | Performed by: OBSTETRICS & GYNECOLOGY

## 2022-09-02 PROCEDURE — 3080F DIAST BP >= 90 MM HG: CPT | Mod: CPTII,,, | Performed by: OBSTETRICS & GYNECOLOGY

## 2022-09-02 PROCEDURE — 3008F PR BODY MASS INDEX (BMI) DOCUMENTED: ICD-10-PCS | Mod: CPTII,,, | Performed by: OBSTETRICS & GYNECOLOGY

## 2022-09-02 PROCEDURE — 3077F SYST BP >= 140 MM HG: CPT | Mod: CPTII,,, | Performed by: OBSTETRICS & GYNECOLOGY

## 2022-09-02 PROCEDURE — 3077F PR MOST RECENT SYSTOLIC BLOOD PRESSURE >= 140 MM HG: ICD-10-PCS | Mod: CPTII,,, | Performed by: OBSTETRICS & GYNECOLOGY

## 2022-09-02 PROCEDURE — 99999 PR PBB SHADOW E&M-EST. PATIENT-LVL V: CPT | Mod: PBBFAC,,, | Performed by: OBSTETRICS & GYNECOLOGY

## 2022-09-02 PROCEDURE — 86304 IMMUNOASSAY TUMOR CA 125: CPT | Performed by: NURSE PRACTITIONER

## 2022-09-02 PROCEDURE — 99215 PR OFFICE/OUTPT VISIT, EST, LEVL V, 40-54 MIN: ICD-10-PCS | Mod: S$PBB,,, | Performed by: OBSTETRICS & GYNECOLOGY

## 2022-09-02 PROCEDURE — 99215 OFFICE O/P EST HI 40 MIN: CPT | Mod: S$PBB,,, | Performed by: OBSTETRICS & GYNECOLOGY

## 2022-09-02 PROCEDURE — 3080F PR MOST RECENT DIASTOLIC BLOOD PRESSURE >= 90 MM HG: ICD-10-PCS | Mod: CPTII,,, | Performed by: OBSTETRICS & GYNECOLOGY

## 2022-09-02 RX ORDER — LIDOCAINE HYDROCHLORIDE 10 MG/ML
1 INJECTION, SOLUTION EPIDURAL; INFILTRATION; INTRACAUDAL; PERINEURAL ONCE
Status: CANCELLED | OUTPATIENT
Start: 2022-09-02 | End: 2022-09-02

## 2022-09-02 RX ORDER — CELECOXIB 100 MG/1
200 CAPSULE ORAL ONCE
Status: CANCELLED | OUTPATIENT
Start: 2022-09-02 | End: 2022-09-02

## 2022-09-02 RX ORDER — HEPARIN SODIUM 5000 [USP'U]/ML
5000 INJECTION, SOLUTION INTRAVENOUS; SUBCUTANEOUS ONCE
Status: CANCELLED | OUTPATIENT
Start: 2022-09-02 | End: 2022-09-02

## 2022-09-02 RX ORDER — GABAPENTIN 100 MG/1
300 CAPSULE ORAL ONCE
Status: CANCELLED | OUTPATIENT
Start: 2022-09-02 | End: 2022-09-02

## 2022-09-02 RX ORDER — ACETAMINOPHEN 325 MG/1
1000 TABLET ORAL ONCE
Status: CANCELLED | OUTPATIENT
Start: 2022-09-02 | End: 2022-09-02

## 2022-09-02 NOTE — TELEPHONE ENCOUNTER
"----- Message from Sridhar Reyes sent at 9/2/2022  3:18 PM CDT -----  Consult/Advisory:          Name Of Caller: Self      Contact Preference?: [phone]       Provider Name: Leeroy      Does patient feel the need to be seen today? No      What is the nature of the call?: Returning call to office.          Additional Notes:  "Thank you for all that you do for our patients"       "

## 2022-09-02 NOTE — H&P (VIEW-ONLY)
REFERRING PROVIDER  Lucinda Renteria NP     HISTORY OF PRESENT CONDITION  Chief complaint: left ovarian mass  Tia MARIAELENA Dias is a 57 y.o. who presents in consultation for an opinion regarding a left ovarian mass with a CA-125 of ?.     +PO. -N/V. +Flatus. +BM. -VB, VD, changes in stool or urine. -Abdominal or pelvic pain. -Unintentional weight loss.      REVIEW OF SYSTEMS  All systems reviewed and negative except as noted in HPI.    OBJECTIVE   Vitals:    09/02/22 1023   BP: (!) 178/96   Pulse: 94      Body mass index is 34.87 kg/m².      1. General: Well appearing, no apparent distress, alert and oriented.  2. Lymph: Neck symmetric without cervical or supraclavicular adenopathy or mass.  3. Lungs: Normal respiratory rate, no accessory muscle use.  4. Cardiac: Normal rate  5. Psych: Normal affect.  6. Abdomen:  non-distended, soft, non-tender, are no masses, no ascites, no hepatosplenomegaly.  7. Skin: Warm, dry, no rashes or lesions.   8. Extremities: Bilateral lower extremities without edema or tenderness.  9. Genitourinary: Deferred                  ECOG status: 1    LABORATORY DATA  Lab data reviewed.    RADIOLOGICAL DATA  Radiology data reviewed.    PATHOLOGY DATA  Pathology data reviewed.    ASSESSMENT / PLAN     1. Ovarian mass       8/10/22: A1c = 5.7, TSH = 0.3, T4 = 1.37  7/21/22: Pelvic US: 5 cm, L complex adnexal mass(9 cm)  7/13/22: Hb 12.8, Plt 223, Cr 0.9, AST 43  7/13/22: CT A/P w/: No omental caking, lymphadenopathy, or ascites    F/U Rheumatology (about MTX)  F/U Anesthesia    I discussed with the patient that the primary treatment for an adnexal mass is observation versus surgical management.  Surgical management will depend on the use of frozen pathology.  Differential diagnoses includes a benign, borderline, or malignant mass.   This more than likely is a benign but due to multiple septations it is by definition of a complex mass.  I will plan to perform this in a robotic fashion.  I discussed  with the patient the role of staging for borderline frozen pathology and debulking for ovarian cancer.  This may include a total abdominal hysterectomy, bilateral salpingo-oophorectomy, omentectomy, diaphragm stripping, bowel resection, splenectomy, and any other necessary procedures to remove all visible disease. We discussed that a temporary or permanent colostomy or ileostomy may be necessary based on surgical factors.The procedure and its risks and benefits were discussed in detail.      PATIENT EDUCATION  Ready to learn, no apparent learning barriers were identified; learning preferences include listening.  Explained diagnosis and treatment plan; patient expressed understanding of the content.    INFORMED CONSENT  Discussed the risks, benefits, and alternatives of the procedure and of possible blood transfusion.  Discussed the necessity of other members of the healthcare team participating in the procedure.  All questions answered and consent given.      Silvestre Umaña

## 2022-09-03 LAB — CANCER AG125 SERPL-ACNC: 30 U/ML (ref 0–30)

## 2022-09-06 ENCOUNTER — PATIENT MESSAGE (OUTPATIENT)
Dept: GYNECOLOGIC ONCOLOGY | Facility: CLINIC | Age: 57
End: 2022-09-06
Payer: MEDICAID

## 2022-09-06 LAB
FINAL PATHOLOGIC DIAGNOSIS: NORMAL
Lab: NORMAL

## 2022-09-06 RX ORDER — GABAPENTIN 100 MG/1
300 CAPSULE ORAL ONCE
Status: CANCELLED | OUTPATIENT
Start: 2022-09-06 | End: 2022-09-06

## 2022-09-06 RX ORDER — LIDOCAINE HYDROCHLORIDE 10 MG/ML
1 INJECTION, SOLUTION EPIDURAL; INFILTRATION; INTRACAUDAL; PERINEURAL ONCE
Status: CANCELLED | OUTPATIENT
Start: 2022-09-06 | End: 2022-09-06

## 2022-09-06 RX ORDER — CELECOXIB 100 MG/1
200 CAPSULE ORAL ONCE
Status: CANCELLED | OUTPATIENT
Start: 2022-09-06 | End: 2022-09-06

## 2022-09-06 RX ORDER — ACETAMINOPHEN 325 MG/1
1000 TABLET ORAL ONCE
Status: CANCELLED | OUTPATIENT
Start: 2022-09-06 | End: 2022-09-06

## 2022-09-06 RX ORDER — HEPARIN SODIUM 5000 [USP'U]/ML
5000 INJECTION, SOLUTION INTRAVENOUS; SUBCUTANEOUS ONCE
Status: CANCELLED | OUTPATIENT
Start: 2022-09-06 | End: 2022-09-06

## 2022-09-07 ENCOUNTER — HOSPITAL ENCOUNTER (OUTPATIENT)
Dept: PREADMISSION TESTING | Facility: OTHER | Age: 57
Discharge: HOME | End: 2022-09-07
Attending: ANESTHESIOLOGY
Payer: MEDICAID

## 2022-09-07 ENCOUNTER — ANESTHESIA EVENT (OUTPATIENT)
Dept: SURGERY | Facility: OTHER | Age: 57
End: 2022-09-07
Payer: MEDICAID

## 2022-09-07 VITALS
SYSTOLIC BLOOD PRESSURE: 179 MMHG | TEMPERATURE: 98 F | DIASTOLIC BLOOD PRESSURE: 94 MMHG | BODY MASS INDEX: 34.8 KG/M2 | HEART RATE: 96 BPM | WEIGHT: 221.69 LBS | OXYGEN SATURATION: 99 % | HEIGHT: 67 IN

## 2022-09-07 DIAGNOSIS — Z01.818 PRE-OP TESTING: Primary | ICD-10-CM

## 2022-09-07 LAB
ABO + RH BLD: NORMAL
BASOPHILS # BLD AUTO: 0.04 K/UL (ref 0–0.2)
BASOPHILS NFR BLD: 0.5 % (ref 0–1.9)
BLD GP AB SCN CELLS X3 SERPL QL: NORMAL
DIFFERENTIAL METHOD: ABNORMAL
EOSINOPHIL # BLD AUTO: 0.2 K/UL (ref 0–0.5)
EOSINOPHIL NFR BLD: 2.3 % (ref 0–8)
ERYTHROCYTE [DISTWIDTH] IN BLOOD BY AUTOMATED COUNT: 14 % (ref 11.5–14.5)
HCT VFR BLD AUTO: 33.2 % (ref 37–48.5)
HGB BLD-MCNC: 11.4 G/DL (ref 12–16)
IMM GRANULOCYTES # BLD AUTO: 0.02 K/UL (ref 0–0.04)
IMM GRANULOCYTES NFR BLD AUTO: 0.3 % (ref 0–0.5)
LYMPHOCYTES # BLD AUTO: 1.6 K/UL (ref 1–4.8)
LYMPHOCYTES NFR BLD: 19.9 % (ref 18–48)
MCH RBC QN AUTO: 33.2 PG (ref 27–31)
MCHC RBC AUTO-ENTMCNC: 34.3 G/DL (ref 32–36)
MCV RBC AUTO: 97 FL (ref 82–98)
MONOCYTES # BLD AUTO: 0.7 K/UL (ref 0.3–1)
MONOCYTES NFR BLD: 8.4 % (ref 4–15)
NEUTROPHILS # BLD AUTO: 5.5 K/UL (ref 1.8–7.7)
NEUTROPHILS NFR BLD: 68.6 % (ref 38–73)
NRBC BLD-RTO: 0 /100 WBC
PLATELET # BLD AUTO: 181 K/UL (ref 150–450)
PMV BLD AUTO: 11.9 FL (ref 9.2–12.9)
RBC # BLD AUTO: 3.43 M/UL (ref 4–5.4)
WBC # BLD AUTO: 7.94 K/UL (ref 3.9–12.7)

## 2022-09-07 PROCEDURE — 86901 BLOOD TYPING SEROLOGIC RH(D): CPT | Performed by: ANESTHESIOLOGY

## 2022-09-07 PROCEDURE — 36415 COLL VENOUS BLD VENIPUNCTURE: CPT | Performed by: ANESTHESIOLOGY

## 2022-09-07 PROCEDURE — 85025 COMPLETE CBC W/AUTO DIFF WBC: CPT | Performed by: ANESTHESIOLOGY

## 2022-09-07 RX ORDER — SODIUM CHLORIDE, SODIUM LACTATE, POTASSIUM CHLORIDE, CALCIUM CHLORIDE 600; 310; 30; 20 MG/100ML; MG/100ML; MG/100ML; MG/100ML
INJECTION, SOLUTION INTRAVENOUS CONTINUOUS
Status: CANCELLED | OUTPATIENT
Start: 2022-09-07

## 2022-09-07 RX ORDER — ACETAMINOPHEN 500 MG
1000 TABLET ORAL
Status: CANCELLED | OUTPATIENT
Start: 2022-09-07 | End: 2022-09-07

## 2022-09-07 RX ORDER — LIDOCAINE HYDROCHLORIDE 10 MG/ML
0.5 INJECTION, SOLUTION EPIDURAL; INFILTRATION; INTRACAUDAL; PERINEURAL ONCE
Status: CANCELLED | OUTPATIENT
Start: 2022-09-07 | End: 2022-09-07

## 2022-09-07 RX ORDER — FAMOTIDINE 20 MG/1
20 TABLET, FILM COATED ORAL
Status: CANCELLED | OUTPATIENT
Start: 2022-09-07 | End: 2022-09-07

## 2022-09-07 NOTE — ANESTHESIA PREPROCEDURE EVALUATION
09/07/2022  Parris Dias is a 57 y.o., female.      Pre-op Assessment    I have reviewed the Patient Summary Reports.     I have reviewed the Nursing Notes. I have reviewed the NPO Status.   I have reviewed the Medications.     Review of Systems  Anesthesia Hx:  No problems with previous Anesthesia    Social:  Non-Smoker    EENT/Dental:EENT/Dental Normal   Cardiovascular:   Hypertension, well controlled    Pulmonary:  Pulmonary Normal    Hepatic/GI:   Liver Disease,    Endocrine:   Diabetes, well controlled Hyperthyroidism  Obesity / BMI > 30      Physical Exam  General: Cooperative and Alert    Airway:  Mallampati: II   Mouth Opening: Normal  TM Distance: Normal  Tongue: Normal  Neck ROM: Normal ROM    Dental:  Intact        Anesthesia Plan  Type of Anesthesia, risks & benefits discussed:    Anesthesia Type: Gen ETT  Intra-op Monitoring Plan: Standard ASA Monitors  Post Op Pain Control Plan: multimodal analgesia  Induction:  IV  Airway Plan: Video  Informed Consent: Informed consent signed with the Patient and all parties understand the risks and agree with anesthesia plan.  All questions answered.   ASA Score: 3  Anesthesia Plan Notes: Saw pcp 2 weeks ago , pt optimized for sx    Ready For Surgery From Anesthesia Perspective.     .

## 2022-09-07 NOTE — DISCHARGE INSTRUCTIONS
Information to Prepare you for your Surgery    PRE-ADMIT TESTING -  [phone]    [address] [facility]          Your surgery has been scheduled at [facility]. We are pleased to have the opportunity to serve you. For Further Information please call [phone].    On the day of surgery please report to the Information Desk on the 1st floor.    CONTACT YOUR PHYSICIAN'S OFFICE THE DAY PRIOR TO YOUR SURGERY TO OBTAIN YOUR ARRIVAL TIME.     The evening before surgery do not eat anything after 9 p.m. ( this includes hard candy, chewing gum and mints).  You may only have GATORADE, POWERADE AND WATER  from 9 p.m. until you leave your home.   DO NOT DRINK ANY LIQUIDS ON THE WAY TO THE HOSPITAL.      Why does your anesthesiologist allow you to drink Gatorade/Powerade before surgery?  Gatorade/Powerade helps to increase your comfort before surgery and to decrease your nausea after surgery. The carbohydrates in Gatorade/Powerade help reduce your body's stress response to surgery.  If you are a diabetic-drink only water prior to surgery.      Current Visitor policy(12/27/2021) - Patients may have 2 visitors pre and post procedure. Only 2 visitors will be allowed in the Surgical building with the patient.     SPECIAL MEDICATION INSTRUCTIONS: TAKE medications checked off by the Anesthesiologist on your Medication List.    Angiogram Patients: Take medications as instructed by your physician, including aspirin.     Surgery Patients:    If you take ASPIRIN - Your PHYSICIAN/SURGEON will need to inform you IF/OR when you need to stop taking aspirin prior to your surgery.     Do Not take any medications containing IBUPROFEN.    Do Not Wear any make-up (especially eye make-up) to surgery. Please remove any false eyelashes or eyelash extensions. If you arrive the day of surgery with makeup/eyelashes on you will be required to remove prior to surgery. (There is a risk of corneal  abrasions if eye makeup/eyelash extensions are not removed)      Leave all valuables at home.   Do Not wear any jewelry or watches, including any metal in body piercings. Jewelry must be removed prior to coming to the hospital.  There is a possibility that rings that are unable to be removed may be cut off if they are on the surgical extremity.    Please remove all hair extensions, wigs, clips and any other metal accessories/ ornaments from your hair.  These items may pose a flammable/fire risk in Surgery and must be removed.    Do not shave your surgical area at least 5 days prior to your surgery. The surgical prep will be performed at the hospital according to Infection Control regulations.    Contact Lens must be removed before surgery. Either do not wear the contact lens or bring a case and solution for storage.  Please bring a container for eyeglasses or dentures as required.  Bring any paperwork your physician has provided, such as consent forms,  history and physicals, doctor's orders, etc.   Bring comfortable clothes that are loose fitting to wear upon discharge. Take into consideration the type of surgery being performed.  Maintain your diet as advised per your physician the day prior to surgery.      Adequate rest the night before surgery is advised.   Park in the Parking lot behind the hospital or in the [employer] [facility] across the street from the parking lot. Parking is complimentary.  If you will be discharged the same day as your procedure, please arrange for a responsible adult to drive you home or to accompany you if traveling by taxi.   YOU WILL NOT BE PERMITTED TO DRIVE OR TO LEAVE THE HOSPITAL ALONE AFTER SURGERY.   If you are being discharged the same day, it is strongly recommended that you arrange for someone to remain with you for the first 24 hrs following your surgery.    The Surgeon will speak to your family/visitor after your surgery regarding the outcome of your surgery and post op  care.  The Surgeon may speak to you after your surgery, but there is a possibility you may not remember the details.  Please check with your family members regarding the conversation with the Surgeon.    We strongly recommend whoever is bringing you home be present for discharge instructions.  This will ensure a thorough understanding for your post op home care.    ALL CHILDREN MUST ALWAYS BE ACCOMPANIED BY AN ADULT.    Visitors-Refer to current Visitor policy handouts.    Thank you for your cooperation.  The Staff of [facility].            Bathing Instructions with Hibiclens    Shower the evening before and morning of your procedure with Hibiclens:  Wash your face with water and your regular face wash/soap  Apply Hibiclens directly on your skin or on a wet washcloth and wash gently. When showering: Move away from the shower stream when applying Hibiclens to avoid rinsing off too soon.  Rinse thoroughly with warm water  Do not dilute Hibiclens        Dry off as usual, do not use any deodorant, powder, body lotions, perfume, after shave or cologne.

## 2022-09-08 LAB
HPV HR 12 DNA SPEC QL NAA+PROBE: NEGATIVE
HPV16 AG SPEC QL: NEGATIVE
HPV18 DNA SPEC QL NAA+PROBE: NEGATIVE

## 2022-09-09 ENCOUNTER — TELEPHONE (OUTPATIENT)
Dept: GYNECOLOGIC ONCOLOGY | Facility: CLINIC | Age: 57
End: 2022-09-09
Payer: MEDICAID

## 2022-09-12 ENCOUNTER — HOSPITAL ENCOUNTER (OUTPATIENT)
Facility: OTHER | Age: 57
Discharge: HOME | End: 2022-09-13
Attending: OBSTETRICS & GYNECOLOGY | Admitting: OBSTETRICS & GYNECOLOGY
Payer: MEDICAID

## 2022-09-12 ENCOUNTER — ANESTHESIA (OUTPATIENT)
Dept: SURGERY | Facility: OTHER | Age: 57
End: 2022-09-12
Payer: MEDICAID

## 2022-09-12 DIAGNOSIS — Z90.710 HISTORY OF ROBOT-ASSISTED LAPAROSCOPIC HYSTERECTOMY: Primary | ICD-10-CM

## 2022-09-12 DIAGNOSIS — N94.89 ADNEXAL MASS: ICD-10-CM

## 2022-09-12 DIAGNOSIS — Z80.3 FAMILY HISTORY OF BREAST CANCER: ICD-10-CM

## 2022-09-12 LAB
POCT GLUCOSE: 105 MG/DL (ref 70–110)
POCT GLUCOSE: 115 MG/DL (ref 70–110)
POCT GLUCOSE: 121 MG/DL (ref 70–110)
POCT GLUCOSE: 146 MG/DL (ref 70–110)
POCT GLUCOSE: 160 MG/DL (ref 70–110)

## 2022-09-12 PROCEDURE — 88305 TISSUE EXAM BY PATHOLOGIST: CPT | Mod: 26,,, | Performed by: PATHOLOGY

## 2022-09-12 PROCEDURE — 25000003 PHARM REV CODE 250: Performed by: NURSE ANESTHETIST, CERTIFIED REGISTERED

## 2022-09-12 PROCEDURE — 63600175 PHARM REV CODE 636 W HCPCS: Performed by: OBSTETRICS & GYNECOLOGY

## 2022-09-12 PROCEDURE — 36000713 HC OR TIME LEV V EA ADD 15 MIN: Performed by: OBSTETRICS & GYNECOLOGY

## 2022-09-12 PROCEDURE — 58571 TLH W/T/O 250 G OR LESS: CPT | Mod: AS,,, | Performed by: NURSE PRACTITIONER

## 2022-09-12 PROCEDURE — 25000003 PHARM REV CODE 250: Performed by: STUDENT IN AN ORGANIZED HEALTH CARE EDUCATION/TRAINING PROGRAM

## 2022-09-12 PROCEDURE — 37000008 HC ANESTHESIA 1ST 15 MINUTES: Performed by: OBSTETRICS & GYNECOLOGY

## 2022-09-12 PROCEDURE — 88305 TISSUE EXAM BY PATHOLOGIST: CPT | Performed by: PATHOLOGY

## 2022-09-12 PROCEDURE — 00944 ANES VAG PX VAG HYSTERECTOMY: CPT | Performed by: OBSTETRICS & GYNECOLOGY

## 2022-09-12 PROCEDURE — 88112 CYTOPATH CELL ENHANCE TECH: CPT | Performed by: PATHOLOGY

## 2022-09-12 PROCEDURE — 25000003 PHARM REV CODE 250: Performed by: ANESTHESIOLOGY

## 2022-09-12 PROCEDURE — 63600175 PHARM REV CODE 636 W HCPCS: Performed by: NURSE ANESTHETIST, CERTIFIED REGISTERED

## 2022-09-12 PROCEDURE — 88309 TISSUE EXAM BY PATHOLOGIST: CPT | Performed by: STUDENT IN AN ORGANIZED HEALTH CARE EDUCATION/TRAINING PROGRAM

## 2022-09-12 PROCEDURE — 88112 CYTOPATH CELL ENHANCE TECH: CPT | Mod: 26,,, | Performed by: PATHOLOGY

## 2022-09-12 PROCEDURE — 63600175 PHARM REV CODE 636 W HCPCS: Performed by: STUDENT IN AN ORGANIZED HEALTH CARE EDUCATION/TRAINING PROGRAM

## 2022-09-12 PROCEDURE — 94799 UNLISTED PULMONARY SVC/PX: CPT

## 2022-09-12 PROCEDURE — 58571 PR LAPAROSCOPY W TOT HYSTERECTUTERUS <=250 GRAM  W TUBE/OVARY: ICD-10-PCS | Mod: AS,,, | Performed by: NURSE PRACTITIONER

## 2022-09-12 PROCEDURE — 58571 PR LAPAROSCOPY W TOT HYSTERECTUTERUS <=250 GRAM  W TUBE/OVARY: ICD-10-PCS | Mod: ,,, | Performed by: OBSTETRICS & GYNECOLOGY

## 2022-09-12 PROCEDURE — 71000039 HC RECOVERY, EACH ADD'L HOUR: Performed by: OBSTETRICS & GYNECOLOGY

## 2022-09-12 PROCEDURE — 37000009 HC ANESTHESIA EA ADD 15 MINS: Performed by: OBSTETRICS & GYNECOLOGY

## 2022-09-12 PROCEDURE — 88112 PR  CYTOPATH, CELL ENHANCE TECH: ICD-10-PCS | Mod: 26,,, | Performed by: PATHOLOGY

## 2022-09-12 PROCEDURE — 25000003 PHARM REV CODE 250: Performed by: OBSTETRICS & GYNECOLOGY

## 2022-09-12 PROCEDURE — 88309 TISSUE EXAM BY PATHOLOGIST: CPT | Mod: 26,,, | Performed by: STUDENT IN AN ORGANIZED HEALTH CARE EDUCATION/TRAINING PROGRAM

## 2022-09-12 PROCEDURE — 71000033 HC RECOVERY, INTIAL HOUR: Performed by: OBSTETRICS & GYNECOLOGY

## 2022-09-12 PROCEDURE — 36000712 HC OR TIME LEV V 1ST 15 MIN: Performed by: OBSTETRICS & GYNECOLOGY

## 2022-09-12 PROCEDURE — 88309 PR  SURG PATH,LEVEL VI: ICD-10-PCS | Mod: 26,,, | Performed by: STUDENT IN AN ORGANIZED HEALTH CARE EDUCATION/TRAINING PROGRAM

## 2022-09-12 PROCEDURE — P9045 ALBUMIN (HUMAN), 5%, 250 ML: HCPCS | Mod: JG | Performed by: NURSE ANESTHETIST, CERTIFIED REGISTERED

## 2022-09-12 PROCEDURE — 88305 TISSUE EXAM BY PATHOLOGIST: ICD-10-PCS | Mod: 26,,, | Performed by: PATHOLOGY

## 2022-09-12 PROCEDURE — 63600175 PHARM REV CODE 636 W HCPCS: Performed by: ANESTHESIOLOGY

## 2022-09-12 PROCEDURE — 27201423 OPTIME MED/SURG SUP & DEVICES STERILE SUPPLY: Performed by: OBSTETRICS & GYNECOLOGY

## 2022-09-12 PROCEDURE — 58571 TLH W/T/O 250 G OR LESS: CPT | Mod: ,,, | Performed by: OBSTETRICS & GYNECOLOGY

## 2022-09-12 PROCEDURE — 94761 N-INVAS EAR/PLS OXIMETRY MLT: CPT | Mod: 59

## 2022-09-12 RX ORDER — KETOROLAC TROMETHAMINE 30 MG/ML
15 INJECTION, SOLUTION INTRAMUSCULAR; INTRAVENOUS EVERY 6 HOURS
Status: DISCONTINUED | OUTPATIENT
Start: 2022-09-12 | End: 2022-09-12

## 2022-09-12 RX ORDER — HYDROMORPHONE HYDROCHLORIDE 2 MG/ML
0.4 INJECTION, SOLUTION INTRAMUSCULAR; INTRAVENOUS; SUBCUTANEOUS EVERY 5 MIN PRN
Status: DISCONTINUED | OUTPATIENT
Start: 2022-09-12 | End: 2022-09-12

## 2022-09-12 RX ORDER — IBUPROFEN 600 MG/1
600 TABLET ORAL EVERY 6 HOURS
Status: DISCONTINUED | OUTPATIENT
Start: 2022-09-13 | End: 2022-09-12

## 2022-09-12 RX ORDER — CEFAZOLIN SODIUM 1 G/3ML
2 INJECTION, POWDER, FOR SOLUTION INTRAMUSCULAR; INTRAVENOUS
Status: COMPLETED | OUTPATIENT
Start: 2022-09-12 | End: 2022-09-12

## 2022-09-12 RX ORDER — IBUPROFEN 200 MG
24 TABLET ORAL
Status: DISCONTINUED | OUTPATIENT
Start: 2022-09-12 | End: 2022-09-13 | Stop reason: HOSPADM

## 2022-09-12 RX ORDER — PHENYLEPHRINE HYDROCHLORIDE 10 MG/ML
INJECTION INTRAVENOUS
Status: DISCONTINUED | OUTPATIENT
Start: 2022-09-12 | End: 2022-09-12

## 2022-09-12 RX ORDER — ACETAMINOPHEN 10 MG/ML
INJECTION, SOLUTION INTRAVENOUS
Status: DISCONTINUED | OUTPATIENT
Start: 2022-09-12 | End: 2022-09-12

## 2022-09-12 RX ORDER — FENTANYL CITRATE 50 UG/ML
INJECTION, SOLUTION INTRAMUSCULAR; INTRAVENOUS
Status: DISCONTINUED | OUTPATIENT
Start: 2022-09-12 | End: 2022-09-12

## 2022-09-12 RX ORDER — KETOROLAC TROMETHAMINE 10 MG/1
10 TABLET, FILM COATED ORAL EVERY 6 HOURS
Qty: 20 TABLET | Refills: 0 | Status: SHIPPED | OUTPATIENT
Start: 2022-09-12 | End: 2022-09-17

## 2022-09-12 RX ORDER — ATROPA BELLADONNA AND OPIUM 16.2; 6 MG/1; MG/1
60 SUPPOSITORY RECTAL EVERY 6 HOURS PRN
Status: DISCONTINUED | OUTPATIENT
Start: 2022-09-12 | End: 2022-09-13 | Stop reason: HOSPADM

## 2022-09-12 RX ORDER — OXYCODONE HYDROCHLORIDE 5 MG/1
10 TABLET ORAL EVERY 4 HOURS PRN
Status: DISCONTINUED | OUTPATIENT
Start: 2022-09-12 | End: 2022-09-13 | Stop reason: HOSPADM

## 2022-09-12 RX ORDER — ROCURONIUM BROMIDE 10 MG/ML
INJECTION, SOLUTION INTRAVENOUS
Status: DISCONTINUED | OUTPATIENT
Start: 2022-09-12 | End: 2022-09-12

## 2022-09-12 RX ORDER — GLUCAGON 1 MG
1 KIT INJECTION
Status: DISCONTINUED | OUTPATIENT
Start: 2022-09-12 | End: 2022-09-13 | Stop reason: HOSPADM

## 2022-09-12 RX ORDER — SODIUM CHLORIDE, SODIUM LACTATE, POTASSIUM CHLORIDE, CALCIUM CHLORIDE 600; 310; 30; 20 MG/100ML; MG/100ML; MG/100ML; MG/100ML
INJECTION, SOLUTION INTRAVENOUS CONTINUOUS
Status: DISCONTINUED | OUTPATIENT
Start: 2022-09-12 | End: 2022-09-12

## 2022-09-12 RX ORDER — HEPARIN SODIUM 5000 [USP'U]/ML
INJECTION, SOLUTION INTRAVENOUS; SUBCUTANEOUS
Status: DISCONTINUED | OUTPATIENT
Start: 2022-09-12 | End: 2022-09-12 | Stop reason: HOSPADM

## 2022-09-12 RX ORDER — PROCHLORPERAZINE EDISYLATE 5 MG/ML
5 INJECTION INTRAMUSCULAR; INTRAVENOUS EVERY 6 HOURS PRN
Status: DISCONTINUED | OUTPATIENT
Start: 2022-09-12 | End: 2022-09-13 | Stop reason: HOSPADM

## 2022-09-12 RX ORDER — LIDOCAINE HYDROCHLORIDE 10 MG/ML
1 INJECTION, SOLUTION EPIDURAL; INFILTRATION; INTRACAUDAL; PERINEURAL ONCE
Status: DISCONTINUED | OUTPATIENT
Start: 2022-09-12 | End: 2022-09-12

## 2022-09-12 RX ORDER — ZOLPIDEM TARTRATE 5 MG/1
5 TABLET ORAL NIGHTLY PRN
Status: DISCONTINUED | OUTPATIENT
Start: 2022-09-12 | End: 2022-09-13 | Stop reason: HOSPADM

## 2022-09-12 RX ORDER — SCOLOPAMINE TRANSDERMAL SYSTEM 1 MG/1
PATCH, EXTENDED RELEASE TRANSDERMAL
Status: DISCONTINUED | OUTPATIENT
Start: 2022-09-12 | End: 2022-09-12

## 2022-09-12 RX ORDER — MUPIROCIN 20 MG/G
OINTMENT TOPICAL 2 TIMES DAILY
Status: DISCONTINUED | OUTPATIENT
Start: 2022-09-12 | End: 2022-09-13 | Stop reason: HOSPADM

## 2022-09-12 RX ORDER — LEVOTHYROXINE SODIUM 25 UG/1
25 TABLET ORAL
Status: DISCONTINUED | OUTPATIENT
Start: 2022-09-13 | End: 2022-09-13 | Stop reason: HOSPADM

## 2022-09-12 RX ORDER — ACETAMINOPHEN 500 MG
1000 TABLET ORAL
Status: DISCONTINUED | OUTPATIENT
Start: 2022-09-12 | End: 2022-09-12

## 2022-09-12 RX ORDER — SIMETHICONE 80 MG
1 TABLET,CHEWABLE ORAL 3 TIMES DAILY PRN
Status: DISCONTINUED | OUTPATIENT
Start: 2022-09-12 | End: 2022-09-13 | Stop reason: HOSPADM

## 2022-09-12 RX ORDER — ALBUMIN HUMAN 50 G/1000ML
SOLUTION INTRAVENOUS CONTINUOUS PRN
Status: DISCONTINUED | OUTPATIENT
Start: 2022-09-12 | End: 2022-09-12

## 2022-09-12 RX ORDER — KETOROLAC TROMETHAMINE 30 MG/ML
30 INJECTION, SOLUTION INTRAMUSCULAR; INTRAVENOUS EVERY 6 HOURS
Status: DISCONTINUED | OUTPATIENT
Start: 2022-09-12 | End: 2022-09-13 | Stop reason: HOSPADM

## 2022-09-12 RX ORDER — ATORVASTATIN CALCIUM 20 MG/1
20 TABLET, FILM COATED ORAL DAILY
Status: DISCONTINUED | OUTPATIENT
Start: 2022-09-12 | End: 2022-09-13 | Stop reason: HOSPADM

## 2022-09-12 RX ORDER — HYDRALAZINE HYDROCHLORIDE 20 MG/ML
10 INJECTION INTRAMUSCULAR; INTRAVENOUS EVERY 6 HOURS PRN
Status: DISCONTINUED | OUTPATIENT
Start: 2022-09-12 | End: 2022-09-13 | Stop reason: HOSPADM

## 2022-09-12 RX ORDER — DIPHENHYDRAMINE HYDROCHLORIDE 50 MG/ML
25 INJECTION INTRAMUSCULAR; INTRAVENOUS EVERY 6 HOURS PRN
Status: DISCONTINUED | OUTPATIENT
Start: 2022-09-12 | End: 2022-09-12

## 2022-09-12 RX ORDER — SODIUM CHLORIDE, SODIUM LACTATE, POTASSIUM CHLORIDE, CALCIUM CHLORIDE 600; 310; 30; 20 MG/100ML; MG/100ML; MG/100ML; MG/100ML
INJECTION, SOLUTION INTRAVENOUS CONTINUOUS
Status: DISCONTINUED | OUTPATIENT
Start: 2022-09-12 | End: 2022-09-13

## 2022-09-12 RX ORDER — ADHESIVE BANDAGE
30 BANDAGE TOPICAL 2 TIMES DAILY
Status: DISCONTINUED | OUTPATIENT
Start: 2022-09-12 | End: 2022-09-13 | Stop reason: HOSPADM

## 2022-09-12 RX ORDER — INSULIN ASPART 100 [IU]/ML
1-10 INJECTION, SOLUTION INTRAVENOUS; SUBCUTANEOUS
Status: DISCONTINUED | OUTPATIENT
Start: 2022-09-12 | End: 2022-09-13 | Stop reason: HOSPADM

## 2022-09-12 RX ORDER — IBUPROFEN 200 MG
16 TABLET ORAL
Status: DISCONTINUED | OUTPATIENT
Start: 2022-09-12 | End: 2022-09-13 | Stop reason: HOSPADM

## 2022-09-12 RX ORDER — LIDOCAINE HYDROCHLORIDE 20 MG/ML
INJECTION INTRAVENOUS
Status: DISCONTINUED | OUTPATIENT
Start: 2022-09-12 | End: 2022-09-12

## 2022-09-12 RX ORDER — GABAPENTIN 300 MG/1
300 CAPSULE ORAL ONCE
Status: COMPLETED | OUTPATIENT
Start: 2022-09-12 | End: 2022-09-12

## 2022-09-12 RX ORDER — SODIUM CHLORIDE 0.9 % (FLUSH) 0.9 %
3 SYRINGE (ML) INJECTION
Status: DISCONTINUED | OUTPATIENT
Start: 2022-09-12 | End: 2022-09-13 | Stop reason: HOSPADM

## 2022-09-12 RX ORDER — PROPOFOL 10 MG/ML
VIAL (ML) INTRAVENOUS
Status: DISCONTINUED | OUTPATIENT
Start: 2022-09-12 | End: 2022-09-12

## 2022-09-12 RX ORDER — FAMOTIDINE 20 MG/1
20 TABLET, FILM COATED ORAL
Status: DISCONTINUED | OUTPATIENT
Start: 2022-09-12 | End: 2022-09-12

## 2022-09-12 RX ORDER — LIDOCAINE HYDROCHLORIDE 10 MG/ML
0.5 INJECTION, SOLUTION EPIDURAL; INFILTRATION; INTRACAUDAL; PERINEURAL ONCE
Status: DISCONTINUED | OUTPATIENT
Start: 2022-09-12 | End: 2022-09-12

## 2022-09-12 RX ORDER — BUPIVACAINE HYDROCHLORIDE AND EPINEPHRINE 5; 5 MG/ML; UG/ML
INJECTION, SOLUTION EPIDURAL; INTRACAUDAL; PERINEURAL
Status: DISCONTINUED | OUTPATIENT
Start: 2022-09-12 | End: 2022-09-12 | Stop reason: HOSPADM

## 2022-09-12 RX ORDER — MEPERIDINE HYDROCHLORIDE 25 MG/ML
12.5 INJECTION INTRAMUSCULAR; INTRAVENOUS; SUBCUTANEOUS ONCE AS NEEDED
Status: DISCONTINUED | OUTPATIENT
Start: 2022-09-12 | End: 2022-09-12

## 2022-09-12 RX ORDER — ACETAMINOPHEN 500 MG
1000 TABLET ORAL EVERY 8 HOURS
Status: DISCONTINUED | OUTPATIENT
Start: 2022-09-12 | End: 2022-09-12

## 2022-09-12 RX ORDER — ONDANSETRON 2 MG/ML
4 INJECTION INTRAMUSCULAR; INTRAVENOUS EVERY 6 HOURS PRN
Status: DISCONTINUED | OUTPATIENT
Start: 2022-09-12 | End: 2022-09-13 | Stop reason: HOSPADM

## 2022-09-12 RX ORDER — OXYCODONE HYDROCHLORIDE 5 MG/1
5 TABLET ORAL EVERY 4 HOURS PRN
Status: DISCONTINUED | OUTPATIENT
Start: 2022-09-12 | End: 2022-09-13 | Stop reason: HOSPADM

## 2022-09-12 RX ORDER — DIPHENHYDRAMINE HYDROCHLORIDE 50 MG/ML
25 INJECTION INTRAMUSCULAR; INTRAVENOUS EVERY 6 HOURS PRN
Status: DISCONTINUED | OUTPATIENT
Start: 2022-09-12 | End: 2022-09-13 | Stop reason: HOSPADM

## 2022-09-12 RX ORDER — NALOXONE HCL 0.4 MG/ML
0.02 VIAL (ML) INJECTION
Status: DISCONTINUED | OUTPATIENT
Start: 2022-09-12 | End: 2022-09-13 | Stop reason: HOSPADM

## 2022-09-12 RX ORDER — FAMOTIDINE 10 MG/ML
INJECTION INTRAVENOUS
Status: DISCONTINUED | OUTPATIENT
Start: 2022-09-12 | End: 2022-09-12

## 2022-09-12 RX ORDER — SENNOSIDES 8.6 MG/1
8.6 TABLET ORAL 2 TIMES DAILY
Status: DISCONTINUED | OUTPATIENT
Start: 2022-09-12 | End: 2022-09-13 | Stop reason: HOSPADM

## 2022-09-12 RX ORDER — OXYCODONE HYDROCHLORIDE 5 MG/1
5 TABLET ORAL EVERY 4 HOURS PRN
Qty: 12 TABLET | Refills: 0 | Status: SHIPPED | OUTPATIENT
Start: 2022-09-12 | End: 2022-11-23

## 2022-09-12 RX ORDER — CELECOXIB 200 MG/1
200 CAPSULE ORAL ONCE
Status: COMPLETED | OUTPATIENT
Start: 2022-09-12 | End: 2022-09-12

## 2022-09-12 RX ORDER — MIDAZOLAM HYDROCHLORIDE 1 MG/ML
INJECTION INTRAMUSCULAR; INTRAVENOUS
Status: DISCONTINUED | OUTPATIENT
Start: 2022-09-12 | End: 2022-09-12

## 2022-09-12 RX ORDER — HYDROMORPHONE HYDROCHLORIDE 2 MG/ML
0.4 INJECTION, SOLUTION INTRAMUSCULAR; INTRAVENOUS; SUBCUTANEOUS
Status: DISCONTINUED | OUTPATIENT
Start: 2022-09-12 | End: 2022-09-13 | Stop reason: HOSPADM

## 2022-09-12 RX ORDER — PROCHLORPERAZINE EDISYLATE 5 MG/ML
5 INJECTION INTRAMUSCULAR; INTRAVENOUS EVERY 30 MIN PRN
Status: DISCONTINUED | OUTPATIENT
Start: 2022-09-12 | End: 2022-09-12

## 2022-09-12 RX ORDER — KETAMINE HCL IN 0.9 % NACL 50 MG/5 ML
SYRINGE (ML) INTRAVENOUS
Status: DISCONTINUED | OUTPATIENT
Start: 2022-09-12 | End: 2022-09-12

## 2022-09-12 RX ORDER — OXYCODONE HYDROCHLORIDE 5 MG/1
5 TABLET ORAL
Status: DISCONTINUED | OUTPATIENT
Start: 2022-09-12 | End: 2022-09-12

## 2022-09-12 RX ADMIN — KETOROLAC TROMETHAMINE 30 MG: 30 INJECTION, SOLUTION INTRAMUSCULAR; INTRAVENOUS at 11:09

## 2022-09-12 RX ADMIN — FENTANYL CITRATE 50 MCG: 50 INJECTION, SOLUTION INTRAMUSCULAR; INTRAVENOUS at 07:09

## 2022-09-12 RX ADMIN — CELECOXIB 200 MG: 200 CAPSULE ORAL at 05:09

## 2022-09-12 RX ADMIN — CEFAZOLIN 2 G: 330 INJECTION, POWDER, FOR SOLUTION INTRAMUSCULAR; INTRAVENOUS at 07:09

## 2022-09-12 RX ADMIN — OXYCODONE 5 MG: 5 TABLET ORAL at 10:09

## 2022-09-12 RX ADMIN — KETOROLAC TROMETHAMINE 15 MG: 30 INJECTION, SOLUTION INTRAMUSCULAR; INTRAVENOUS at 11:09

## 2022-09-12 RX ADMIN — MUPIROCIN: 20 OINTMENT TOPICAL at 09:09

## 2022-09-12 RX ADMIN — GABAPENTIN 300 MG: 300 CAPSULE ORAL at 05:09

## 2022-09-12 RX ADMIN — PHENYLEPHRINE HYDROCHLORIDE 0.2 MCG/KG/MIN: 10 INJECTION INTRAVENOUS at 08:09

## 2022-09-12 RX ADMIN — SENNOSIDES 8.6 MG: 8.6 TABLET, FILM COATED ORAL at 11:09

## 2022-09-12 RX ADMIN — Medication 30 MG: at 07:09

## 2022-09-12 RX ADMIN — PHENYLEPHRINE HYDROCHLORIDE 100 MCG: 10 INJECTION INTRAVENOUS at 08:09

## 2022-09-12 RX ADMIN — SODIUM CHLORIDE, SODIUM LACTATE, POTASSIUM CHLORIDE, AND CALCIUM CHLORIDE: 600; 310; 30; 20 INJECTION, SOLUTION INTRAVENOUS at 10:09

## 2022-09-12 RX ADMIN — SUGAMMADEX 402 MG: 100 INJECTION, SOLUTION INTRAVENOUS at 08:09

## 2022-09-12 RX ADMIN — MUPIROCIN: 20 OINTMENT TOPICAL at 11:09

## 2022-09-12 RX ADMIN — MAGNESIUM HYDROXIDE 2400 MG: 400 SUSPENSION ORAL at 09:09

## 2022-09-12 RX ADMIN — FENTANYL CITRATE 100 MCG: 50 INJECTION, SOLUTION INTRAMUSCULAR; INTRAVENOUS at 07:09

## 2022-09-12 RX ADMIN — MIDAZOLAM HYDROCHLORIDE 2 MG: 1 INJECTION, SOLUTION INTRAMUSCULAR; INTRAVENOUS at 06:09

## 2022-09-12 RX ADMIN — PHENYLEPHRINE HYDROCHLORIDE 200 MCG: 10 INJECTION INTRAVENOUS at 07:09

## 2022-09-12 RX ADMIN — ATORVASTATIN CALCIUM 20 MG: 20 TABLET, FILM COATED ORAL at 11:09

## 2022-09-12 RX ADMIN — LIDOCAINE HYDROCHLORIDE 100 MG: 20 INJECTION, SOLUTION INTRAVENOUS at 07:09

## 2022-09-12 RX ADMIN — ALBUMIN (HUMAN): 12.5 SOLUTION INTRAVENOUS at 07:09

## 2022-09-12 RX ADMIN — ROCURONIUM BROMIDE 20 MG: 10 SOLUTION INTRAVENOUS at 07:09

## 2022-09-12 RX ADMIN — KETOROLAC TROMETHAMINE 30 MG: 30 INJECTION, SOLUTION INTRAMUSCULAR; INTRAVENOUS at 05:09

## 2022-09-12 RX ADMIN — SCOPALAMINE 1 PATCH: 1 PATCH, EXTENDED RELEASE TRANSDERMAL at 07:09

## 2022-09-12 RX ADMIN — SENNOSIDES 8.6 MG: 8.6 TABLET, FILM COATED ORAL at 09:09

## 2022-09-12 RX ADMIN — PROPOFOL 200 MG: 10 INJECTION, EMULSION INTRAVENOUS at 07:09

## 2022-09-12 RX ADMIN — ROCURONIUM BROMIDE 50 MG: 10 SOLUTION INTRAVENOUS at 07:09

## 2022-09-12 RX ADMIN — SODIUM CHLORIDE, SODIUM LACTATE, POTASSIUM CHLORIDE, AND CALCIUM CHLORIDE: 600; 310; 30; 20 INJECTION, SOLUTION INTRAVENOUS at 06:09

## 2022-09-12 RX ADMIN — MAGNESIUM HYDROXIDE 2400 MG: 400 SUSPENSION ORAL at 11:09

## 2022-09-12 RX ADMIN — FAMOTIDINE 20 MG: 10 INJECTION, SOLUTION INTRAVENOUS at 07:09

## 2022-09-12 RX ADMIN — ACETAMINOPHEN 1000 MG: 10 INJECTION, SOLUTION INTRAVENOUS at 07:09

## 2022-09-12 NOTE — OP NOTE
[facility] ([place])    Procedure(s) (LRB):  XI ROBOTIC HYSTERECTOMY,WITH SALPINGO-OOPHORECTOMY (Bilateral)    DATE OF SURGERY  9/12/2022     Surgeon(s) and Role  Primary: Silvestre Umaña MD       ANESTHESIA TYPE  General     PRE-OPERATIVE DIAGNOSIS  Adnexal mass [N94.89]  Family history of breast cancer [Z80.3]    POST-OPERATIVE DIAGNOSIS  Post-Op Diagnosis Codes:     * Adnexal mass [N94.89]     * Family history of breast cancer [Z80.3]    FINDINGS  Normal diaphragms.  Cirrhotic appearing liver.  Normal omentum.  Normal uterus and right tube and ovary.  Left adnexal mass that measure approximately 10 cm with a smooth capsule.  Due to no definitive diagnosis of cirrhosis, normal liver function tests, and tolerability of anesthesia the decision was made to proceed with the surgery.    Procedure in Detail: The patient was prepped and draped in synchronous position in [facility]. After sterile prep and drape the Skinner catheter was inserted. Gloves and gowns were changed, and we began by directly entering the abdomen.  A small incision was made  4 cm above the umbilicus using a Hossan trocar, the peritoneal cavity was entered, and a pneumoperitoneum was established including trocar placement.  The laparoscope was inserted and examination of the peritoneal cavity revealed the operative findings above. The remaining 3 robotic ports and assistant port were placed under direct vision and the robot was docked. Beginning on the right side, the ureter was identified, and the ovarian vessels were sealed and transected using bipolar coagulation. The cardinal ligaments were skeletonized, the ureter visualized laterally, and the uterine vessels were ligated and divided using bipolar coagulation. An identical procedure was performed on the left side.  The bladder flap was developed and the uterosacral ligaments were transected.   The vagina was incised, and a circumferential incision was made.  The tubes, ovaries,  uterus, and cervix were placed in a bag and removed through the vagina..  The vagina was closed in a double fashion using Stratafix suture, incorporating the uterosacral ligaments into the cuff closure. The ureters were followed from the pelvic brim to the vaginal cuff closure, and were not compromised. Hemostasis was satisfactory. The trocars were removed, and the fascia of assistant port was not closed. The camera port was closed with Vicryl suture. The skin of all sites was closed with 4-0 Monocryl.  The patient was taken to recovery in stable condition.    Salt, needle, sponge, and instrument count was correct.  I was present and scrubbed for the entirety of the case.     ASSISTANT SURGEONS  Gena Lara's presence was critical to the completion of this due case to a qualified resident not being available.    UNUSUAL CIRCUMSTANCES  No    CONDITION  chronic    POST-OP COMPLICATION  No    DIABETIC  No    SPECIMENS  Specimens (From admission, onward)       Start     Ordered    09/12/22 0818  Specimen to Pathology, Surgery Gynecology and Obstetrics  Once        Comments: Pre-op Diagnosis: Adnexal mass [N94.89]Family history of breast cancer [Z80.3]Procedure(s):XI ROBOTIC HYSTERECTOMYSALPINGO-OOPHORECTOMY Number of specimens: 1Name of specimens: 1. Cervix, uterus, bilateral tubes and bilateral ovaries     References:    Click here for ordering Quick Tip   Question Answer Comment   Procedure Type: Gynecology and Obstetrics    Specimen Class: Known or suspected malignancy    Which provider would you like to cc? BLANCHE JOHNSON    Release to patient Immediate        09/12/22 0849    09/12/22 0744  Cytology, Fluid/Wash/Brush  Once        Comments: Pelvic washingMD Jorge     Question Answer Comment   Source: Peritoneal/abdominal/pelvic wash    Clinical Information: n/a    Specific Site: Abdomen    Other Requests: n/a    Release to patient Immediate        09/12/22 0744                     DRAINS       Urethral  Catheter 09/12/22 0715 Non-latex 16 Fr. (Active)        ESTIMATED BLOOD LOSS  10 cc           IMPLANTS  N

## 2022-09-12 NOTE — DISCHARGE SUMMARY
[facility]  Obstetrics & Gynecology  Discharge Summary    Patient Name: Parris Dias  MRN: [id number]  Admission Date: 2022  Hospital Length of Stay: 0 days  Discharge Date and Time:  2022 6:37 AM  Attending Physician: Silvestre Umaña MD   Discharging Provider: Jerrica Unger MD  Primary Care Provider: Primary Doctor No    HPI: Parris Dias is a 57 y.o.  who presented for scheduled RA-TLH/BSO 2/2 adnexal mass.     Hospital Course:   Patient presented for scheduled procedure. Patient was passed back to OR for RA-TLH/BSO. Please see OP note for further details. Tolerated procedure well and patient was taken to recovery in a stable condition and then taken to the floor for extended recovery. Prior to discharge patient was able to void, ambulate, tolerate PO and pain was well controlled with PO meds. Patient was given routine post-op instructions for which patient voiced understanding. Patient was subsequently discharged home.      Procedure(s) (LRB):  XI ROBOTIC HYSTERECTOMY,WITH SALPINGO-OOPHORECTOMY (Bilateral)  XI ROBOTIC LYSIS, ADHESIONS (N/A)       Pending Diagnostic Studies:       Procedure Component Value Units Date/Time    Cytology, Fluid/Wash/Brush [[id number]] Collected: 22 0744    Order Status: Sent Lab Status: In process Updated: 22 0852    Specimen: Body Fluid     Specimen to Pathology, Surgery Gynecology and Obstetrics [[id number]] Collected: 22 0850    Order Status: Sent Lab Status: In process Updated: 22 1054    Specimen: Tissue           Final Active Diagnoses:    Diagnosis Date Noted POA    PRINCIPAL PROBLEM:  S/P RA-TLH/BSO [Z90.710] 2022 Not Applicable      Problems Resolved During this Admission:    Diagnosis Date Noted Date Resolved POA    Adnexal mass [N94.89] 2022 Yes        Discharged Condition: good    Disposition: Home or Self Care    Follow Up:    Patient Instructions:      Diet general     Lifting restrictions    Order Comments: No lifting greater than 15 pounds for six weeks.     Other restrictions (specify):   Order Comments: PELVIC REST:  No douching, tampons, or intercourse for 6 weeks.    If prescribed, vaginal estrogen cream may be used during the postoperative period.     DRIVING:  No driving while on narcotics. Driving may be resumed initially with a competent passenger one to two weeks after surgery if no longer taking narcotics.     EXERCISE:  For six weeks your exercise should be limited to walking. You may walk as far as you wish, as long as you increase your level of exertion gradually and avoid slippery surfaces. You may climb stairs as needed to get around, but should not use stair climbing for exercise.     Wound care routine (specify)   Order Comments: WOUND CARE:  If you have a band-aid or bandage on your wound, you may remove it the day after dismissal.  You may wash the wound with mild soap and water.   You may shower at any time but should avoid immersing any abdominal incisions in water for at least two weeks after surgery or until the wound is completely healed. If given, please shower with Hibiclens soap until bottle is completely finished. Keep your wound clean and dry.  You should observe your incision for signs of infection which include redness, warmth, drainage or fever.     Call MD for:  temperature >100.4     Call MD for:  persistent nausea and vomiting     Call MD for:  severe uncontrolled pain     Call MD for:  difficulty breathing, headache or visual disturbances     Call MD for:  redness, tenderness, or signs of infection (pain, swelling, redness, odor or green/yellow discharge around incision site)     Call MD for:  hives     Call MD for:   Order Comments: inability to void,urine is ketchup colored or you have large clots, vaginal bleeding is heavier than a period.    VAGINAL DISCHARGE: You may develop a vaginal discharge and intermittent vaginal spotting after surgery and up to 6 weeks  postoperatively.  The discharge may have an odor and may change in color but it is normal.  This is due to dissolving stiches.  Contact your surgical team if you develop vaginal or vulvar irritation along with a discharge.  Also contact your surgical team if you have vaginal discharge that smells like urine or stool.    CONSTIPATION REMEDIES: Patients are often constipated after surgery or with use of oral narcotic medicine. You should continue to take the stool softener, Senokot-S during the next six weeks, and consume adequate amounts of water.  If you have not had a bowel movement for 3 days after dismissal, or are uncomfortable and unable to pass stool, please try one or all of the following measures:  1.  Milk of Magnesia - 30 cc by mouth every 12 hours   2.  Dulcolax suppository - One suppository per rectum every 4-6 hours   3.  Metamucil, Fibercon or other bulk former - use as directed  4.  Fleets Enema        PAIN MEDICATIONS:     Take your pain medications as instructed. It is best to take pain medications before your pain becomes severe. This will allow you to take less medication yet have better pain relief. For the first 2 or 3 days it may be helpful to take your pain medications on a regular schedule (e.g. every 4 to 6 hours). This will help you to keep your pain under better control. You should then begin to take fewer medications each day until you no longer need them. Do not take pain medication on an empty stomach. This may lead to nausea and vomiting.     Activity as tolerated   Order Comments: Return to normal activity slowly as you feel able.  For 6 weeks your exercise should be limited to walking.  You may walk as far as you wish, as long as you increase your level of exertion gradually and avoid slippery surfaces.    If you had a hysterectomy at the surgery do not insert anything in your vagina for 9 weeks.     Shower on day dressing removed (No bath)   Order Comments: You may shower at any  time but should avoid immersing any abdominal incisions in water for at least 2 weeks after surgery or until the wound is completely healed.  If given, please shower with Hibaclens soap until bottle is completely finish     Medications:  Reconciled Home Medications:      Medication List        START taking these medications      ketorolac 10 mg tablet  Commonly known as: TORADOL  Take 1 tablet (10 mg total) by mouth every 6 (six) hours. for 5 days     oxyCODONE 5 MG immediate release tablet  Commonly known as: ROXICODONE  Take 1 tablet (5 mg total) by mouth every 4 (four) hours as needed for Pain.            CHANGE how you take these medications      ondansetron 4 MG tablet  Commonly known as: ZOFRAN  Take 1 tablet (4 mg total) by mouth every 6 (six) hours.  What changed:   when to take this  reasons to take this            CONTINUE taking these medications      atorvastatin 20 MG tablet  Commonly known as: LIPITOR  Take 20 mg by mouth once daily.     ergocalciferol 50,000 unit Cap  Commonly known as: ERGOCALCIFEROL  Take 50,000 Units by mouth every 7 days.     folic acid 1 MG tablet  Commonly known as: FOLVITE  Take 1 mg by mouth once daily.     levothyroxine 25 MCG tablet  Commonly known as: SYNTHROID  Take 25 mcg by mouth before breakfast.     losartan 100 MG tablet  Commonly known as: COZAAR  Take 100 mg by mouth once daily.     metFORMIN 1000 MG tablet  Commonly known as: GLUCOPHAGE  Take 1,000 mg by mouth 2 (two) times daily with meals.     methotrexate 2.5 MG Tab  Take by mouth every 7 days.     OZEMPIC 1 mg/dose (2 mg/1.5 mL) Pnij  Generic drug: semaglutide  Inject into the skin every 7 days.              Jerrica Unger MD  Obstetrics & Gynecology  [facility] ([facility])

## 2022-09-12 NOTE — ANESTHESIA PROCEDURE NOTES
Intubation    Date/Time: 9/12/2022 7:08 AM  Performed by: Arun Carrington CRNA  Authorized by: Johnathan Parra MD     Intubation:     Induction:  Intravenous    Intubated:  Postinduction    Attempted By:  CRNA    Method of Intubation:  Video laryngoscopy    Blade:  Adrianna 3    Laryngeal View Grade: Grade I - full view of cords      Difficult Airway Encountered?: No      Complications:  None    Airway Device:  Oral endotracheal tube    Airway Device Size:  7.5    Style/Cuff Inflation:  Cuffed    Inflation Amount (mL):  4    Tube secured:  22    Secured at:  The lips    Placement Verified By:  Capnometry    Complicating Factors:  None    Findings Post-Intubation:  BS equal bilateral

## 2022-09-12 NOTE — INTERVAL H&P NOTE
The patient has been examined and the H&P has been reviewed:    I concur with the findings and no changes have occurred since H&P was written.    Anesthesia/Surgery risks, benefits and alternative options discussed and understood by patient/family.    Jerrica Unger M.D.   OB/GYN  PGY-4        There are no hospital problems to display for this patient.

## 2022-09-12 NOTE — ANESTHESIA POSTPROCEDURE EVALUATION
Anesthesia Post Evaluation    Patient: Parris Dias    Procedure(s) Performed: Procedure(s) (LRB):  XI ROBOTIC HYSTERECTOMY,WITH SALPINGO-OOPHORECTOMY (Bilateral)  XI ROBOTIC LYSIS, ADHESIONS (N/A)    Final Anesthesia Type: general      Patient location during evaluation: PACU  Patient participation: Yes- Able to Participate  Level of consciousness: awake and alert  Post-procedure vital signs: reviewed and stable  Pain management: adequate  Airway patency: patent    PONV status at discharge: No PONV  Anesthetic complications: no      Cardiovascular status: blood pressure returned to baseline and stable  Respiratory status: room air  Hydration status: euvolemic  Follow-up not needed.          Vitals Value Taken Time   /70 09/12/22 1011   Temp 36.4 °C (97.5 °F) 09/12/22 0922   Pulse 79 09/12/22 1022   Resp 16 09/12/22 1007   SpO2 95 % 09/12/22 1022   Vitals shown include unvalidated device data.      No case tracking events are documented in the log.      Pain/Charlie Score: Pain Rating Prior to Med Admin: 4 (9/12/2022 10:05 AM)  Charlie Score: 9 (9/12/2022  9:52 AM)

## 2022-09-12 NOTE — PLAN OF CARE
Patient ambulatory with standby assist. Voiding spontaneously without difficulty. VSS, afebrile. Pain tolerable on current regimen. No N/V. Incisions C/D/I.   Problem: Adult Inpatient Plan of Care  Goal: Plan of Care Review  Outcome: Ongoing, Progressing  Goal: Optimal Comfort and Wellbeing  Outcome: Ongoing, Progressing  Goal: Readiness for Transition of Care  Outcome: Ongoing, Progressing     Problem: Bleeding (Hysterectomy)  Goal: Absence of Bleeding  Outcome: Ongoing, Progressing     Problem: Infection (Hysterectomy)  Goal: Absence of Infection Signs and Symptoms  Outcome: Ongoing, Progressing     Problem: Postoperative Nausea and Vomiting (Hysterectomy)  Goal: Nausea and Vomiting Relief  Outcome: Ongoing, Progressing

## 2022-09-12 NOTE — TRANSFER OF CARE
"Anesthesia Transfer of Care Note    Patient: Parris Dias    Procedure(s) Performed: Procedure(s) (LRB):  XI ROBOTIC HYSTERECTOMY,WITH SALPINGO-OOPHORECTOMY (Bilateral)  XI ROBOTIC LYSIS, ADHESIONS (N/A)    Patient location: PACU    Anesthesia Type: general    Transport from OR: Transported from OR on 2-3 L/min O2 by NC with adequate spontaneous ventilation    Post pain: adequate analgesia    Post assessment: no apparent anesthetic complications    Post vital signs: stable    Level of consciousness: awake    Nausea/Vomiting: no nausea/vomiting    Complications: none    Transfer of care protocol was followed      Last vitals:   Visit Vitals  BP (!) 181/83   Pulse 95   Temp 36.8 °C (98.2 °F) (Oral)   Resp 18   Ht 5' 7" (1.702 m)   Wt 100.5 kg (221 lb 10.8 oz)   SpO2 98%   Breastfeeding No   BMI 34.72 kg/m²     " Sickle Cell Crisis

## 2022-09-12 NOTE — PROGRESS NOTES
Pt's V/S stable on room air while in PACU. Reports only mild abdominal pain, PRN pain meds given per order. Abdominal lap sites clean, dry, intact. Emily pad with no drainage at present. Patient reports urge to void, placed on bedpan with 250cc yellow urine output noted. Will continue to monitor. Report given to GENI Bauman on [address]. Will transport to room [facility] shortly, safety precautions in place.

## 2022-09-12 NOTE — OPERATIVE NOTE ADDENDUM
Certification of Assistant at Surgery       Surgery Date: 9/12/2022     Participating Surgeons:  Surgeon(s) and Role:     * Silvestre Umaña MD - Primary    Procedures:  Procedure(s) (LRB):  XI ROBOTIC HYSTERECTOMY,WITH SALPINGO-OOPHORECTOMY (Bilateral)  XI ROBOTIC LYSIS, ADHESIONS (N/A)    Assistant Surgeon's Certification of Necessity:  I understand that section 1842 (b) (6) (d) of the Social Security Act generally prohibits Medicare Part B reasonable charge payment for the services of assistants at surgery in teaching hospitals when qualified residents are available to furnish such services. I certify that the services for which payment is claimed were medically necessary, and that no qualified resident was available to perform the services. I further understand that these services are subject to post-payment review by the Medicare carrier.      Gena Lara NP    09/12/2022  8:58 AM

## 2022-09-12 NOTE — PROGRESS NOTES
Progress Note  Gynecology    Admit Date: 2022  LOS: 0    Reason for Admission:  History of robot-assisted laparoscopic hysterectomy    SUBJECTIVE:     Parris Dias is a 57 y.o.  who is POD#1 s/p RA-TLH/BSO for the treatment of an adnexal mass.  Pt doing well this morning. Pain is well controlled with scheduled toradol, did not need any PRN medications overnight. She is tolerating a diabetic diet without N/V. Ambulating without difficulty. Voiding without difficulty. Passing flatus and has had a bowel movement since surgery.    OBJECTIVE:     Vital Signs   Temp:  [97.5 °F (36.4 °C)-98.3 °F (36.8 °C)] 98.3 °F (36.8 °C)  Pulse:  [72-86] 84  Resp:  [16-18] 18  SpO2:  [95 %-100 %] 95 %  BP: (121-169)/(58-75) 121/58      Intake/Output Summary (Last 24 hours) at 2022 0632  Last data filed at 2022 1010  Gross per 24 hour   Intake 1660 ml   Output 820 ml   Net 840 ml       Physical Exam:  Gen: A&Ox3, NAD  CV: RR  Pulm: Normal respiratory effort  Abd: Normo-active bowel sounds, soft, non-distended, non-tender to palpation without rebound or guarding  Inc: Laparoscopic port site incision covered with steris c/d/I with minimal shadowing  Ext: PPP, no peripheral edema, TEDs/SCDs in place      Laboratory:  Recent Results (from the past 24 hour(s))   POCT glucose    Collection Time: 22  9:25 AM   Result Value Ref Range    POCT Glucose 146 (H) 70 - 110 mg/dL   POCT glucose    Collection Time: 22 12:12 PM   Result Value Ref Range    POCT Glucose 160 (H) 70 - 110 mg/dL   POCT glucose    Collection Time: 22  5:02 PM   Result Value Ref Range    POCT Glucose 115 (H) 70 - 110 mg/dL   POCT glucose    Collection Time: 22  8:02 PM   Result Value Ref Range    POCT Glucose 121 (H) 70 - 110 mg/dL           ASSESSMENT/PLAN:     Active Hospital Problems    Diagnosis  POA    *S/P RA-TLH/BSO [Z90.710]  Not Applicable      Resolved Hospital Problems    Diagnosis Date Resolved POA    Adnexal mass [N94.89]  2022 Yes       Assessment: 57 y.o.  POD#1 s/p procedure as above    Plan:   1. Post-op   - Routine post-op advances  - Continue PRN pain medications  - Encourage ambulation   - Encourage IS  - UOP adequate yesterday (0.7cc/kg/hr) and multiple unmeasured voids overnight  - DC IVF   - Antiemetics prn nausea/vomiting.    2. HTN  - holding home losartan  - Hydralazine PRN for SBP >180  - BP: (121-169)/(58-75) 121/58     3. DM  - Holding home metformin and ozempic  - Diabetic diet  - SSI ordered    4. HLD  - Cont home lipitor    5. Hypothyroid  - Cont home synthroid 25 mcg    6. Liver cirrhosis  - Found intraoperatively- will place outpatient consult to hepatology. No urgent need for inpatient management.   - Avoiding tylenol  - [facility]    Dispo: As patient meets appropriate post-op milestones, plan for discharge to home POD#1.    Jerrica Unger M.D.   OB/GYN  PGY-4

## 2022-09-13 VITALS
WEIGHT: 221.69 LBS | TEMPERATURE: 98 F | HEIGHT: 67 IN | RESPIRATION RATE: 18 BRPM | BODY MASS INDEX: 34.8 KG/M2 | HEART RATE: 72 BPM | SYSTOLIC BLOOD PRESSURE: 134 MMHG | OXYGEN SATURATION: 98 % | DIASTOLIC BLOOD PRESSURE: 63 MMHG

## 2022-09-13 PROCEDURE — 25000003 PHARM REV CODE 250: Performed by: STUDENT IN AN ORGANIZED HEALTH CARE EDUCATION/TRAINING PROGRAM

## 2022-09-13 PROCEDURE — 63600175 PHARM REV CODE 636 W HCPCS: Performed by: STUDENT IN AN ORGANIZED HEALTH CARE EDUCATION/TRAINING PROGRAM

## 2022-09-13 RX ADMIN — LEVOTHYROXINE SODIUM 25 MCG: 25 TABLET ORAL at 05:09

## 2022-09-13 RX ADMIN — KETOROLAC TROMETHAMINE 30 MG: 30 INJECTION, SOLUTION INTRAMUSCULAR; INTRAVENOUS at 05:09

## 2022-09-13 RX ADMIN — SODIUM CHLORIDE, SODIUM LACTATE, POTASSIUM CHLORIDE, AND CALCIUM CHLORIDE: 600; 310; 30; 20 INJECTION, SOLUTION INTRAVENOUS at 05:09

## 2022-09-14 ENCOUNTER — TELEPHONE (OUTPATIENT)
Dept: GYNECOLOGIC ONCOLOGY | Facility: CLINIC | Age: 57
End: 2022-09-14
Payer: MEDICAID

## 2022-09-14 LAB
FINAL PATHOLOGIC DIAGNOSIS: NORMAL
Lab: NORMAL

## 2022-09-14 NOTE — TELEPHONE ENCOUNTER
"Attempted to call pt to discuss her request to change her last name to Parris Guevara per [employer] representative. LM informing pt that I cannot change her name in her medical record. I left her detailed instructions to go into a facility and present proper identification and proof of current last name to have it changed. I provided pt with call back # for any questions and requested she contact me to inform me when she has completed this so that her epic requisition for genetic testing can be updated to read "Parris Guevara" and sent to [facility].   "

## 2022-09-16 ENCOUNTER — TELEPHONE (OUTPATIENT)
Dept: GYNECOLOGIC ONCOLOGY | Facility: CLINIC | Age: 57
End: 2022-09-16
Payer: MEDICAID

## 2022-09-19 ENCOUNTER — HOSPITAL ENCOUNTER (OUTPATIENT)
Dept: RADIOLOGY | Facility: HOSPITAL | Age: 57
Discharge: HOME | End: 2022-09-19
Attending: HOSPITALIST
Payer: MEDICAID

## 2022-09-19 DIAGNOSIS — Z12.31 ENCOUNTER FOR SCREENING MAMMOGRAM FOR BREAST CANCER: ICD-10-CM

## 2022-09-19 PROCEDURE — 77063 BREAST TOMOSYNTHESIS BI: CPT | Mod: 26,,, | Performed by: RADIOLOGY

## 2022-09-19 PROCEDURE — 77063 MAMMO DIGITAL SCREENING BILAT WITH TOMO: ICD-10-PCS | Mod: 26,,, | Performed by: RADIOLOGY

## 2022-09-19 PROCEDURE — 77067 MAMMO DIGITAL SCREENING BILAT WITH TOMO: ICD-10-PCS | Mod: 26,,, | Performed by: RADIOLOGY

## 2022-09-19 PROCEDURE — 77067 SCR MAMMO BI INCL CAD: CPT | Mod: 26,,, | Performed by: RADIOLOGY

## 2022-09-19 PROCEDURE — 77063 BREAST TOMOSYNTHESIS BI: CPT | Mod: TC,PO

## 2022-09-20 LAB
FINAL PATHOLOGIC DIAGNOSIS: NORMAL
GROSS: NORMAL
Lab: NORMAL

## 2022-09-21 ENCOUNTER — OFFICE VISIT (OUTPATIENT)
Dept: INTERNAL MEDICINE | Facility: CLINIC | Age: 57
End: 2022-09-21
Payer: MEDICAID

## 2022-09-21 VITALS
BODY MASS INDEX: 35.12 KG/M2 | RESPIRATION RATE: 18 BRPM | TEMPERATURE: 98 F | OXYGEN SATURATION: 99 % | WEIGHT: 223.75 LBS | HEART RATE: 96 BPM | DIASTOLIC BLOOD PRESSURE: 78 MMHG | HEIGHT: 67 IN | SYSTOLIC BLOOD PRESSURE: 138 MMHG

## 2022-09-21 DIAGNOSIS — E89.0 POSTOPERATIVE HYPOTHYROIDISM: ICD-10-CM

## 2022-09-21 DIAGNOSIS — I15.2 HYPERTENSION ASSOCIATED WITH TYPE 2 DIABETES MELLITUS: Primary | ICD-10-CM

## 2022-09-21 DIAGNOSIS — E11.9 TYPE 2 DIABETES MELLITUS WITHOUT COMPLICATION, WITHOUT LONG-TERM CURRENT USE OF INSULIN: ICD-10-CM

## 2022-09-21 DIAGNOSIS — R93.2 ABNORMAL CT OF LIVER: ICD-10-CM

## 2022-09-21 DIAGNOSIS — E11.59 HYPERTENSION ASSOCIATED WITH TYPE 2 DIABETES MELLITUS: Primary | ICD-10-CM

## 2022-09-21 DIAGNOSIS — E78.5 HYPERLIPIDEMIA ASSOCIATED WITH TYPE 2 DIABETES MELLITUS: ICD-10-CM

## 2022-09-21 DIAGNOSIS — E11.69 HYPERLIPIDEMIA ASSOCIATED WITH TYPE 2 DIABETES MELLITUS: ICD-10-CM

## 2022-09-21 DIAGNOSIS — R74.8 ELEVATED ALKALINE PHOSPHATASE LEVEL: ICD-10-CM

## 2022-09-21 PROCEDURE — 99214 PR OFFICE/OUTPT VISIT, EST, LEVL IV, 30-39 MIN: ICD-10-PCS | Mod: S$PBB,,, | Performed by: HOSPITALIST

## 2022-09-21 PROCEDURE — 1160F RVW MEDS BY RX/DR IN RCRD: CPT | Mod: CPTII,,, | Performed by: HOSPITALIST

## 2022-09-21 PROCEDURE — 4010F ACE/ARB THERAPY RXD/TAKEN: CPT | Mod: CPTII,,, | Performed by: HOSPITALIST

## 2022-09-21 PROCEDURE — 3008F PR BODY MASS INDEX (BMI) DOCUMENTED: ICD-10-PCS | Mod: CPTII,,, | Performed by: HOSPITALIST

## 2022-09-21 PROCEDURE — 1159F PR MEDICATION LIST DOCUMENTED IN MEDICAL RECORD: ICD-10-PCS | Mod: CPTII,,, | Performed by: HOSPITALIST

## 2022-09-21 PROCEDURE — 3078F PR MOST RECENT DIASTOLIC BLOOD PRESSURE < 80 MM HG: ICD-10-PCS | Mod: CPTII,,, | Performed by: HOSPITALIST

## 2022-09-21 PROCEDURE — 3066F NEPHROPATHY DOC TX: CPT | Mod: CPTII,,, | Performed by: HOSPITALIST

## 2022-09-21 PROCEDURE — 99214 OFFICE O/P EST MOD 30 MIN: CPT | Mod: S$PBB,,, | Performed by: HOSPITALIST

## 2022-09-21 PROCEDURE — 99999 PR PBB SHADOW E&M-EST. PATIENT-LVL V: CPT | Mod: PBBFAC,,, | Performed by: HOSPITALIST

## 2022-09-21 PROCEDURE — 1160F PR REVIEW ALL MEDS BY PRESCRIBER/CLIN PHARMACIST DOCUMENTED: ICD-10-PCS | Mod: CPTII,,, | Performed by: HOSPITALIST

## 2022-09-21 PROCEDURE — 3061F PR NEG MICROALBUMINURIA RESULT DOCUMENTED/REVIEW: ICD-10-PCS | Mod: CPTII,,, | Performed by: HOSPITALIST

## 2022-09-21 PROCEDURE — 99999 PR PBB SHADOW E&M-EST. PATIENT-LVL V: ICD-10-PCS | Mod: PBBFAC,,, | Performed by: HOSPITALIST

## 2022-09-21 PROCEDURE — 3075F SYST BP GE 130 - 139MM HG: CPT | Mod: CPTII,,, | Performed by: HOSPITALIST

## 2022-09-21 PROCEDURE — 3066F PR DOCUMENTATION OF TREATMENT FOR NEPHROPATHY: ICD-10-PCS | Mod: CPTII,,, | Performed by: HOSPITALIST

## 2022-09-21 PROCEDURE — 4010F PR ACE/ARB THEARPY RXD/TAKEN: ICD-10-PCS | Mod: CPTII,,, | Performed by: HOSPITALIST

## 2022-09-21 PROCEDURE — 99215 OFFICE O/P EST HI 40 MIN: CPT | Mod: PBBFAC,PO | Performed by: HOSPITALIST

## 2022-09-21 PROCEDURE — 1159F MED LIST DOCD IN RCRD: CPT | Mod: CPTII,,, | Performed by: HOSPITALIST

## 2022-09-21 PROCEDURE — 3061F NEG MICROALBUMINURIA REV: CPT | Mod: CPTII,,, | Performed by: HOSPITALIST

## 2022-09-21 PROCEDURE — 3044F PR MOST RECENT HEMOGLOBIN A1C LEVEL <7.0%: ICD-10-PCS | Mod: CPTII,,, | Performed by: HOSPITALIST

## 2022-09-21 PROCEDURE — 3008F BODY MASS INDEX DOCD: CPT | Mod: CPTII,,, | Performed by: HOSPITALIST

## 2022-09-21 PROCEDURE — 3044F HG A1C LEVEL LT 7.0%: CPT | Mod: CPTII,,, | Performed by: HOSPITALIST

## 2022-09-21 PROCEDURE — 3078F DIAST BP <80 MM HG: CPT | Mod: CPTII,,, | Performed by: HOSPITALIST

## 2022-09-21 PROCEDURE — 3075F PR MOST RECENT SYSTOLIC BLOOD PRESS GE 130-139MM HG: ICD-10-PCS | Mod: CPTII,,, | Performed by: HOSPITALIST

## 2022-09-21 RX ORDER — LEVOTHYROXINE SODIUM 125 UG/1
125 TABLET ORAL DAILY
Qty: 90 TABLET | Refills: 1 | Status: SHIPPED | OUTPATIENT
Start: 2022-09-21 | End: 2022-09-21

## 2022-09-21 RX ORDER — LEVOTHYROXINE SODIUM 125 UG/1
125 TABLET ORAL DAILY
Qty: 90 TABLET | Refills: 1 | Status: SHIPPED | OUTPATIENT
Start: 2022-09-21 | End: 2023-03-28

## 2022-09-21 RX ORDER — VALSARTAN 320 MG/1
320 TABLET ORAL DAILY
Qty: 90 TABLET | Refills: 3 | Status: SHIPPED | OUTPATIENT
Start: 2022-09-21 | End: 2022-10-11 | Stop reason: SDUPTHER

## 2022-09-21 RX ORDER — METFORMIN HYDROCHLORIDE 500 MG/1
1000 TABLET, EXTENDED RELEASE ORAL
COMMUNITY
Start: 2022-07-21 | End: 2022-11-23

## 2022-09-21 NOTE — PROGRESS NOTES
Subjective:     @Patient ID: Parris Guevara is a 57 y.o. female.    Chief Complaint: blood pressure    HPI    56 yo F with HTN, DM2, HLD, psoriatic arthritis, Grave's disease hypothyroidism s/p partial thyroidectomy at age 11 yo, presents for bp check and f/u        1. DM2: last a1c 5.7; On metformin  mg bid and ozempic. Foot exam: due  Eye exam: due   Reports some nausea not related she feels to medication  2. HTN: losartan 100 mg qday.   Reports amlodpine 5 mg takes prn. States a full tablet makes her feel sick. Will take 1/2 tablet prn. Reports BP usually 120-130/70s home. lately has been in 130-140/80-90s.   3. HLD: atorvastatin 40 mg qday  4. Ovarian mass: diagnosed on CT scan in ER. Also had ultrasound. Seenon  with GYN. Underwent s/p hysterectomy and BSO. Path showed ROSALBA I otherwise no malignancy  5. Liver nodular contour, possibly cirrhosis: seen on CT scan in ER in July. Recommend f/u. Pt Reports her mother  of CARTER cirrhosis, liver cancer. Pt has upcoming appt with Hepatology in 2 weeks   5. Psoriatic arthritis: on methotrexate, folic acid 1 mg qday and Celebrex. Follows with rheumatology at [facility] Dr Thao Haynes.    6. Hypothyroidism s/p partial thyroidectomy: on synthroid 137 mcg qday  7. Obesity: pt limited with activity with hx of arthritis   8. Gallstones: had n/v episode in the ER. Occurs with RUQ abdominal pain intermittently associated after eating heavy meals. CT scan on 7/15 showed small calcified gallstones         Review of Systems   Constitutional:  Negative for chills and fever.   HENT:  Negative for congestion and sore throat.    Eyes:  Negative for pain and visual disturbance.   Respiratory:  Negative for cough and shortness of breath.    Cardiovascular:  Negative for chest pain and leg swelling.   Gastrointestinal:  Negative for abdominal pain, nausea and vomiting.   Endocrine: Negative for polydipsia and polyuria.   Genitourinary:  Negative for difficulty urinating and dysuria.    Musculoskeletal:  Positive for arthralgias.   Skin:  Negative for rash and wound.   Neurological:  Negative for dizziness, weakness and headaches.   Psychiatric/Behavioral:  Negative for agitation and confusion.    Past medical history, surgical history, and family medical history reviewed and updated as appropriate.    Medications and allergies reviewed.     Objective:     There were no vitals filed for this visit.  There is no height or weight on file to calculate BMI.  Physical Exam  Constitutional:       Appearance: Normal appearance.   HENT:      Head: Normocephalic and atraumatic.   Eyes:      General:         Right eye: No discharge.         Left eye: No discharge.      Conjunctiva/sclera: Conjunctivae normal.   Cardiovascular:      Rate and Rhythm: Normal rate and regular rhythm.      Heart sounds: No murmur heard.  Pulmonary:      Effort: Pulmonary effort is normal.      Breath sounds: Normal breath sounds.   Abdominal:      Palpations: Abdomen is soft.      Comments: + healing surgical sites of abdomen    Musculoskeletal:         General: Normal range of motion.      Cervical back: Normal range of motion and neck supple.      Right lower leg: No edema.      Left lower leg: No edema.   Skin:     General: Skin is warm and dry.   Neurological:      Mental Status: She is alert and oriented to person, place, and time.   Psychiatric:         Mood and Affect: Mood normal.         Behavior: Behavior normal.       Lab Results   Component Value Date    WBC 7.94 09/07/2022    HGB 11.4 (L) 09/07/2022    HCT 33.2 (L) 09/07/2022     09/07/2022    CHOL 134 08/10/2022    TRIG 86 08/10/2022    HDL 40 08/10/2022    ALT 45 (H) 08/10/2022    AST 45 (H) 08/10/2022     08/10/2022    K 4.1 08/10/2022     08/10/2022    CREATININE 0.7 08/10/2022    BUN 13 08/10/2022    CO2 22 (L) 08/10/2022    TSH 0.300 (L) 08/10/2022    HGBA1C 5.7 (H) 08/10/2022       Assessment:     1. Hypertension associated with type 2  diabetes mellitus    2. Type 2 diabetes mellitus without complication, without long-term current use of insulin    3. Hyperlipidemia associated with type 2 diabetes mellitus    4. Postoperative hypothyroidism    5. Abnormal CT of liver    6. Elevated alkaline phosphatase level      Plan:   Tia was seen today for blood pressure.    Diagnoses and all orders for this visit:    Hypertension associated with type 2 diabetes mellitus  - Bp stable. Will switch losartan to Valsartan for better bp control  -     TSH; Future  -     Hemoglobin A1C; Future  -     COMPREHENSIVE METABOLIC PANEL; Future  -     CBC W/ AUTO DIFFERENTIAL; Future    Type 2 diabetes mellitus without complication, without long-term current use of insulin  - Well controlled. Continue home meds  -     Hemoglobin A1C; Future  -     COMPREHENSIVE METABOLIC PANEL; Future  -     CBC W/ AUTO DIFFERENTIAL; Future    Hyperlipidemia associated with type 2 diabetes mellitus  - Stable. Continue home meds     Postoperative hypothyroidism  - decrease dose of synthroid. Check tsh in 3 months     Abnormal CT of liver  Elevated alkaline phosphatase level  - concern for cirrhosis  - pt aware of health lifestyle measures  - has upcoming appt with hepatology in 2 weeks     Other orders  -     Discontinue: levothyroxine (SYNTHROID) 125 MCG tablet; Take 1 tablet (125 mcg total) by mouth once daily.  -     levothyroxine (SYNTHROID) 125 MCG tablet; Take 1 tablet (125 mcg total) by mouth once daily.  -     valsartan (DIOVAN) 320 MG tablet; Take 1 tablet (320 mg total) by mouth once daily.    Reviewed lab results in clinic     Rtc 3 months for bp, thyroid and a1c check    Reyna Duval MD  Internal Medicine    9/21/2022

## 2022-10-04 ENCOUNTER — TELEPHONE (OUTPATIENT)
Dept: GYNECOLOGIC ONCOLOGY | Facility: CLINIC | Age: 57
End: 2022-10-04
Payer: MEDICAID

## 2022-10-04 NOTE — TELEPHONE ENCOUNTER
Spoke with our patient about her insurance, reschedule appointment she voiced understanding of the date, time and location. All questions answered appointment mail. Provider Scheduling Coord.  Gynecologic Oncology MA/PAR /Preceptor Aubrey Spann

## 2022-10-06 ENCOUNTER — OFFICE VISIT (OUTPATIENT)
Dept: HEPATOLOGY | Facility: CLINIC | Age: 57
End: 2022-10-06
Payer: MEDICAID

## 2022-10-06 ENCOUNTER — LAB VISIT (OUTPATIENT)
Dept: LAB | Facility: HOSPITAL | Age: 57
End: 2022-10-06
Attending: INTERNAL MEDICINE
Payer: MEDICAID

## 2022-10-06 VITALS
BODY MASS INDEX: 34.46 KG/M2 | DIASTOLIC BLOOD PRESSURE: 83 MMHG | TEMPERATURE: 98 F | WEIGHT: 219.56 LBS | SYSTOLIC BLOOD PRESSURE: 172 MMHG | HEIGHT: 67 IN | HEART RATE: 92 BPM | RESPIRATION RATE: 18 BRPM | OXYGEN SATURATION: 99 %

## 2022-10-06 DIAGNOSIS — Z80.0 FAMILY HISTORY OF LIVER CANCER: ICD-10-CM

## 2022-10-06 DIAGNOSIS — R93.2 ABNORMAL CT OF LIVER: ICD-10-CM

## 2022-10-06 DIAGNOSIS — K74.60 CIRRHOSIS OF LIVER WITHOUT ASCITES, UNSPECIFIED HEPATIC CIRRHOSIS TYPE: Primary | ICD-10-CM

## 2022-10-06 DIAGNOSIS — R79.89 ELEVATED LFTS: ICD-10-CM

## 2022-10-06 DIAGNOSIS — K74.60 CIRRHOSIS OF LIVER WITHOUT ASCITES, UNSPECIFIED HEPATIC CIRRHOSIS TYPE: ICD-10-CM

## 2022-10-06 LAB
AFP SERPL-MCNC: 8.4 NG/ML (ref 0–8.4)
ALBUMIN SERPL BCP-MCNC: 3.5 G/DL (ref 3.5–5.2)
ALP SERPL-CCNC: 177 U/L (ref 55–135)
ALT SERPL W/O P-5'-P-CCNC: 27 U/L (ref 10–44)
ANION GAP SERPL CALC-SCNC: 10 MMOL/L (ref 8–16)
AST SERPL-CCNC: 34 U/L (ref 10–40)
BASOPHILS # BLD AUTO: 0.03 K/UL (ref 0–0.2)
BASOPHILS NFR BLD: 0.4 % (ref 0–1.9)
BILIRUB SERPL-MCNC: 0.5 MG/DL (ref 0.1–1)
BUN SERPL-MCNC: 16 MG/DL (ref 6–20)
CALCIUM SERPL-MCNC: 9.6 MG/DL (ref 8.7–10.5)
CHLORIDE SERPL-SCNC: 108 MMOL/L (ref 95–110)
CO2 SERPL-SCNC: 21 MMOL/L (ref 23–29)
CREAT SERPL-MCNC: 0.7 MG/DL (ref 0.5–1.4)
DIFFERENTIAL METHOD: ABNORMAL
EOSINOPHIL # BLD AUTO: 0.3 K/UL (ref 0–0.5)
EOSINOPHIL NFR BLD: 3 % (ref 0–8)
ERYTHROCYTE [DISTWIDTH] IN BLOOD BY AUTOMATED COUNT: 13.9 % (ref 11.5–14.5)
EST. GFR  (NO RACE VARIABLE): >60 ML/MIN/1.73 M^2
GLUCOSE SERPL-MCNC: 95 MG/DL (ref 70–110)
HCT VFR BLD AUTO: 34.1 % (ref 37–48.5)
HGB BLD-MCNC: 10.8 G/DL (ref 12–16)
IMM GRANULOCYTES # BLD AUTO: 0.05 K/UL (ref 0–0.04)
IMM GRANULOCYTES NFR BLD AUTO: 0.6 % (ref 0–0.5)
INR PPP: 1.1 (ref 0.8–1.2)
LYMPHOCYTES # BLD AUTO: 1.6 K/UL (ref 1–4.8)
LYMPHOCYTES NFR BLD: 19.8 % (ref 18–48)
MCH RBC QN AUTO: 32 PG (ref 27–31)
MCHC RBC AUTO-ENTMCNC: 31.7 G/DL (ref 32–36)
MCV RBC AUTO: 101 FL (ref 82–98)
MONOCYTES # BLD AUTO: 0.6 K/UL (ref 0.3–1)
MONOCYTES NFR BLD: 6.6 % (ref 4–15)
NEUTROPHILS # BLD AUTO: 5.8 K/UL (ref 1.8–7.7)
NEUTROPHILS NFR BLD: 69.6 % (ref 38–73)
NRBC BLD-RTO: 0 /100 WBC
PLATELET # BLD AUTO: 161 K/UL (ref 150–450)
PMV BLD AUTO: 11.7 FL (ref 9.2–12.9)
POTASSIUM SERPL-SCNC: 4.1 MMOL/L (ref 3.5–5.1)
PROT SERPL-MCNC: 8 G/DL (ref 6–8.4)
PROTHROMBIN TIME: 11.1 SEC (ref 9–12.5)
RBC # BLD AUTO: 3.38 M/UL (ref 4–5.4)
SODIUM SERPL-SCNC: 139 MMOL/L (ref 136–145)
WBC # BLD AUTO: 8.29 K/UL (ref 3.9–12.7)

## 2022-10-06 PROCEDURE — 1159F PR MEDICATION LIST DOCUMENTED IN MEDICAL RECORD: ICD-10-PCS | Mod: CPTII,,, | Performed by: INTERNAL MEDICINE

## 2022-10-06 PROCEDURE — 82105 ALPHA-FETOPROTEIN SERUM: CPT | Performed by: INTERNAL MEDICINE

## 2022-10-06 PROCEDURE — 99999 PR PBB SHADOW E&M-EST. PATIENT-LVL V: ICD-10-PCS | Mod: PBBFAC,,, | Performed by: INTERNAL MEDICINE

## 2022-10-06 PROCEDURE — 3008F PR BODY MASS INDEX (BMI) DOCUMENTED: ICD-10-PCS | Mod: CPTII,,, | Performed by: INTERNAL MEDICINE

## 2022-10-06 PROCEDURE — 3061F NEG MICROALBUMINURIA REV: CPT | Mod: CPTII,,, | Performed by: INTERNAL MEDICINE

## 2022-10-06 PROCEDURE — 3077F PR MOST RECENT SYSTOLIC BLOOD PRESSURE >= 140 MM HG: ICD-10-PCS | Mod: CPTII,,, | Performed by: INTERNAL MEDICINE

## 2022-10-06 PROCEDURE — 3077F SYST BP >= 140 MM HG: CPT | Mod: CPTII,,, | Performed by: INTERNAL MEDICINE

## 2022-10-06 PROCEDURE — 3044F PR MOST RECENT HEMOGLOBIN A1C LEVEL <7.0%: ICD-10-PCS | Mod: CPTII,,, | Performed by: INTERNAL MEDICINE

## 2022-10-06 PROCEDURE — 3079F DIAST BP 80-89 MM HG: CPT | Mod: CPTII,,, | Performed by: INTERNAL MEDICINE

## 2022-10-06 PROCEDURE — 3008F BODY MASS INDEX DOCD: CPT | Mod: CPTII,,, | Performed by: INTERNAL MEDICINE

## 2022-10-06 PROCEDURE — 85025 COMPLETE CBC W/AUTO DIFF WBC: CPT | Performed by: INTERNAL MEDICINE

## 2022-10-06 PROCEDURE — 4010F PR ACE/ARB THEARPY RXD/TAKEN: ICD-10-PCS | Mod: CPTII,,, | Performed by: INTERNAL MEDICINE

## 2022-10-06 PROCEDURE — 1159F MED LIST DOCD IN RCRD: CPT | Mod: CPTII,,, | Performed by: INTERNAL MEDICINE

## 2022-10-06 PROCEDURE — 3066F PR DOCUMENTATION OF TREATMENT FOR NEPHROPATHY: ICD-10-PCS | Mod: CPTII,,, | Performed by: INTERNAL MEDICINE

## 2022-10-06 PROCEDURE — 1160F PR REVIEW ALL MEDS BY PRESCRIBER/CLIN PHARMACIST DOCUMENTED: ICD-10-PCS | Mod: CPTII,,, | Performed by: INTERNAL MEDICINE

## 2022-10-06 PROCEDURE — 99999 PR PBB SHADOW E&M-EST. PATIENT-LVL V: CPT | Mod: PBBFAC,,, | Performed by: INTERNAL MEDICINE

## 2022-10-06 PROCEDURE — 3079F PR MOST RECENT DIASTOLIC BLOOD PRESSURE 80-89 MM HG: ICD-10-PCS | Mod: CPTII,,, | Performed by: INTERNAL MEDICINE

## 2022-10-06 PROCEDURE — 3061F PR NEG MICROALBUMINURIA RESULT DOCUMENTED/REVIEW: ICD-10-PCS | Mod: CPTII,,, | Performed by: INTERNAL MEDICINE

## 2022-10-06 PROCEDURE — 4010F ACE/ARB THERAPY RXD/TAKEN: CPT | Mod: CPTII,,, | Performed by: INTERNAL MEDICINE

## 2022-10-06 PROCEDURE — 85610 PROTHROMBIN TIME: CPT | Performed by: INTERNAL MEDICINE

## 2022-10-06 PROCEDURE — 1160F RVW MEDS BY RX/DR IN RCRD: CPT | Mod: CPTII,,, | Performed by: INTERNAL MEDICINE

## 2022-10-06 PROCEDURE — 3066F NEPHROPATHY DOC TX: CPT | Mod: CPTII,,, | Performed by: INTERNAL MEDICINE

## 2022-10-06 PROCEDURE — 3044F HG A1C LEVEL LT 7.0%: CPT | Mod: CPTII,,, | Performed by: INTERNAL MEDICINE

## 2022-10-06 PROCEDURE — 80053 COMPREHEN METABOLIC PANEL: CPT | Performed by: INTERNAL MEDICINE

## 2022-10-06 PROCEDURE — 99215 OFFICE O/P EST HI 40 MIN: CPT | Mod: PBBFAC | Performed by: INTERNAL MEDICINE

## 2022-10-06 PROCEDURE — 99205 PR OFFICE/OUTPT VISIT, NEW, LEVL V, 60-74 MIN: ICD-10-PCS | Mod: S$PBB,,, | Performed by: INTERNAL MEDICINE

## 2022-10-06 PROCEDURE — 99205 OFFICE O/P NEW HI 60 MIN: CPT | Mod: S$PBB,,, | Performed by: INTERNAL MEDICINE

## 2022-10-06 PROCEDURE — 36415 COLL VENOUS BLD VENIPUNCTURE: CPT | Performed by: INTERNAL MEDICINE

## 2022-10-06 NOTE — PATIENT INSTRUCTIONS
Recommendations:  -  Labs today, followed by every 6 months:  CBC, CMP, PT INR, AFP  -  Ultrasound of abdomen and AFP every 6 months, next due in January 2023.  -  Low salt in the diet, avoid canned, bottled and processed foods.  -  Continue current meds  -  Avoid alcohol, smoking, sedatives and meds with codeine.  -  Avoid high intake of Tylenol (more than 4 extra-strength pills in one day)  -  Call us if any bleeding, fevers, confusion, disorientation occur  -  Endoscopy: EGD needs to be done.- schedule  -  Transplant option not discussed, will evaluate when MELD >15.  -  Return in 3 months.

## 2022-10-06 NOTE — PROGRESS NOTES
[facility] Consultation Note    Reason for Consult:  Diagnoses of Abnormal CT of liver, Elevated LFTs, and Family history of liver cancer were pertinent to this visit.    PCP: Reyna Duval   [address] / [place] [id number]    HPI:  This is a 57 y.o. female here for evaluation of: No reason provided for this visit, except this is a post-discharge from hosp.      Review shows elevated transaminases 43 each AST and ALT, and 150 alk phos.     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  She never had jaundice.  Since she has seen a medical doctor and rheumatologist at [facility].  She has been on methotrexate for severe rash all over the body, which was psoriatic rash with arthritis, and they gave her methotraxate which she took for 1.5 yrs. Labs were monitored monthly, LFTs have been slightly elevated during the treatment.  Methotrexate was stopped by the GYN surgeon, Dr. Pieter Umaña, when during abd hysterectomy (on 2022), he saw cirrhosis in the liver.  She has now been followed with labs.  She states, if she takes tylenol or ibuprofen, she had nosebleeds within a few hours. So she avoids taking tylenol or ibuprofen.  She has fatigue, mornings are hard due to fatigue.   Sometimes nausea, once/week.  No swelling in LExtrem.      Mother  of non-alcoholic cirrhosis.  No other family member with liver disease or HCC.    CT abd. 22:  There is diffuse nodular contour of the liver which can be seen with underlying cirrhosis.  Spleen is enlarged measuring 15 cm.  Portal vein and splenic vein are patent. There is no intra-or extrahepatic biliary ductal dilatation.  Gallbladder is contracted with small calcified stones seen.   3. Diffuse nodular contour of the liver which can be seen in the setting of cirrhosis.  Clinical correlation is needed.  4.  Splenomegaly.      Elevated liver enzymes: Yes  Abnormal imaging: Yes  Cirrhosis: Yes  Hepatitis C: No  Hepatitis B: No  Fatty liver: No  Encephalopathy: No  Post-hospital discharge: Yes  Symptoms: fatigue, nausea off and on    Primary hepatic manifestations:  Fatigue:Yes  Edema:No  Ascites:No  Encephalopathy:No  Abdominal pain:No  GI bleeds: Yes - epistaxis, no UGI bleed  Pruritus:No  Weight Changes:lost 16 lb when she was throwing up in July 2022.   Changes in Bowel habits: No  Muscle cramps:No    Risk factors for liver disease:  No jaundice  No transfusions  No IVDU  Did not snort cocaine or similar agents  Did not live with anyone with hepatitis B or C  Sexual partner not tested  No hepatotoxic medications  No exposure to industrial toxins  Alcohol: None      ROS:  Constitutional: No fevers, chills, had weight loss after vomiting  ENT: No allergies, But has nosebleeds after taking tylenol/ibuprofen,   GI/Liver: see HPI  Derm: had psoriasis,   MSK: Has joint pains  : No hematuria  Neuro: No confusion, disorientation, syncope, seizure    Medical History:  has a past medical history of Arthritis, Diabetes mellitus, Diabetes mellitus, type 2, Essential (primary) hypertension, Fatty liver, Hyperlipidemia, Hypertension, Hyperthyroidism, Hypothyroidism, Psoriasis, and Spleen enlarged.    Surgical History:  has a past surgical history that includes Thyroidectomy, partial; xi robotic hysterectomy, with salpingo-oophorectomy (Bilateral, 9/12/2022); and Robot-assisted laparoscopic lysis of adhesions using da Ema Xi (N/A, 9/12/2022).    Family History: family history includes Breast cancer in her cousin, cousin, and sister; Cancer in her brother, mother, and sister; Cirrhosis in her mother; Liver cancer in her mother..     Social History:  reports that she has never smoked. She has never used smokeless tobacco. She reports that she does not currently use alcohol. She reports that she does not currently use  drugs.    Review of patient's allergies indicates:  No Known Allergies    Current Outpatient Rx   Medication Sig Dispense Refill    atorvastatin (LIPITOR) 20 MG tablet Take 20 mg by mouth once daily.      ergocalciferol (ERGOCALCIFEROL) 50,000 unit Cap Take 50,000 Units by mouth every 7 days.      folic acid (FOLVITE) 1 MG tablet Take 1 mg by mouth once daily.      levothyroxine (SYNTHROID) 125 MCG tablet Take 1 tablet (125 mcg total) by mouth once daily. 90 tablet 1    metFORMIN (GLUCOPHAGE) 1000 MG tablet Take 1,000 mg by mouth 2 (two) times daily with meals.      methotrexate 2.5 MG Tab Take 20 mg by mouth every 7 days.      ondansetron (ZOFRAN) 4 MG tablet Take 1 tablet (4 mg total) by mouth every 6 (six) hours. 12 tablet 0    semaglutide (OZEMPIC) 1 mg/dose (2 mg/1.5 mL) PnIj Inject into the skin every 7 days.      valsartan (DIOVAN) 320 MG tablet Take 1 tablet (320 mg total) by mouth once daily. 90 tablet 3    atorvastatin (LIPITOR) 40 MG tablet Take 40 mg by mouth once daily.      celecoxib (CELEBREX) 200 MG capsule Take 200 mg by mouth once daily.      ergocalciferol (ERGOCALCIFEROL) 50,000 unit Cap Take 50,000 Units by mouth every 7 days.      folic acid (FOLVITE) 1 MG tablet Take 1,000 mcg by mouth once daily.      metFORMIN (GLUCOPHAGE-XR) 500 MG ER 24hr tablet Take 1,000 mg by mouth every morning.      metFORMIN (GLUCOPHAGE-XR) 500 MG ER 24hr tablet Take 1,000 mg by mouth.      methotrexate 2.5 MG Tab Take by mouth every 7 days.      ondansetron (ZOFRAN) 4 MG tablet Take by mouth 2 (two) times daily.      oxyCODONE (ROXICODONE) 5 MG immediate release tablet Take 1 tablet (5 mg total) by mouth every 4 (four) hours as needed for Pain. (Patient not taking: Reported on 10/6/2022) 12 tablet 0    OZEMPIC 0.25 mg or 0.5 mg(2 mg/1.5 mL) pen injector Inject into the skin.         Objective Findings:    Vital Signs:  BP (!) 172/83 (BP Location: Right arm, Patient Position: Sitting, BP Method: Medium (Automatic))   " Pulse 92   Temp 98 °F (36.7 °C) (Oral)   Resp 18   Ht 5' 7" (1.702 m)   Wt 99.6 kg (219 lb 9.3 oz)   SpO2 99%   BMI 34.39 kg/m²   Body mass index is 34.39 kg/m².    Physical Exam:  General Appearance: Well appearing in no acute distress  Head:   Normocephalic, without obvious abnormality  Eyes:    No scleral icterus, EOMI   Abdomen: non distended. No ascites on imaging  Extremities:  no clubbing, cyanosis or edema  Skin: No rash  Neurologic: Alert, oriented x 3.       Labs:  Lab Results   Component Value Date    WBC 7.94 09/07/2022    HGB 11.4 (L) 09/07/2022    HCT 33.2 (L) 09/07/2022     09/07/2022    CHOL 134 08/10/2022    TRIG 86 08/10/2022    HDL 40 08/10/2022    CREATININE 0.7 08/10/2022    BUN 13 08/10/2022    BILITOT 1.3 (H) 08/10/2022    ALT 45 (H) 08/10/2022    AST 45 (H) 08/10/2022    ALKPHOS 150 (H) 08/10/2022     08/10/2022    K 4.1 08/10/2022     08/10/2022    CO2 22 (L) 08/10/2022    TSH 0.300 (L) 08/10/2022    HGBA1C 5.7 (H) 08/10/2022   Computed MELD-Na score unavailable. Necessary lab results were not found in the last year.  Computed MELD score unavailable. Necessary lab results were not found in the last year.     Imaging:       Endoscopy:      Assessment:  1. Abnormal CT of liver    2. Elevated LFTs    3. Family history of liver cancer      Cirrhosis, possibly related to methotrexate.  Will get liver biopsy.   Splenomegaly 2/2 cirrhosis.  Cannot get MELD score due to missing INR.       Recommendations:  -  Labs today, followed by every 6 months:  CBC, CMP, PT INR, AFP  -  Ultrasound of abdomen and AFP every 6 months, next due in January 2023.  -  Low salt in the diet, avoid canned, bottled and processed foods.  -  Continue current meds  -  Avoid alcohol, smoking, sedatives and meds with codeine.  -  Avoid high intake of Tylenol (more than 4 extra-strength pills in one day)  -  Call us if any bleeding, fevers, confusion, disorientation occur  -  Endoscopy: EGD needs to " be done.  -  Transplant option not discussed, will evaluate when MELD >15.  -  Return in 3 months.       No follow-ups on file.      Order summary:  No orders of the defined types were placed in this encounter.      Thank you so much for allowing me to participate in the care of Tia Ismael Barger MD

## 2022-10-06 NOTE — Clinical Note
Recommendations: -  Labs today, followed by every 6 months:  CBC, CMP, PT INR, AFP -  Ultrasound of abdomen and AFP every 6 months, next due in January 2023. -  Low salt in the diet, avoid canned, bottled and processed foods. -  Continue current meds -  Avoid alcohol, smoking, sedatives and meds with codeine. -  Avoid high intake of Tylenol (more than 4 extra-strength pills in one day) -  Call us if any bleeding, fevers, confusion, disorientation occur -  Endoscopy: EGD needs to be done. -  Transplant option not discussed, will evaluate when MELD >15. -  Return in 3 months.

## 2022-10-07 ENCOUNTER — TELEPHONE (OUTPATIENT)
Dept: HEPATOLOGY | Facility: CLINIC | Age: 57
End: 2022-10-07
Payer: MEDICAID

## 2022-10-07 NOTE — TELEPHONE ENCOUNTER
----- Message from Nelda Barger MD sent at 10/6/2022 10:10 AM CDT -----  Recommendations:  -  Labs today, followed by every 6 months:  CBC, CMP, PT INR, AFP  -  Ultrasound of abdomen and AFP every 6 months, next due in January 2023.  -  Low salt in the diet, avoid canned, bottled and processed foods.  -  Continue current meds  -  Avoid alcohol, smoking, sedatives and meds with codeine.  -  Avoid high intake of Tylenol (more than 4 extra-strength pills in one day)  -  Call us if any bleeding, fevers, confusion, disorientation occur  -  Endoscopy: EGD needs to be done.  -  Transplant option not discussed, will evaluate when MELD >15.  -  Return in 3 months.

## 2022-10-10 ENCOUNTER — TELEPHONE (OUTPATIENT)
Dept: GYNECOLOGIC ONCOLOGY | Facility: CLINIC | Age: 57
End: 2022-10-10
Payer: MEDICAID

## 2022-10-10 DIAGNOSIS — K74.60 CIRRHOSIS OF LIVER WITHOUT ASCITES, UNSPECIFIED HEPATIC CIRRHOSIS TYPE: Primary | ICD-10-CM

## 2022-10-10 NOTE — TELEPHONE ENCOUNTER
Spoke with our patient about her insurance, reschedule appointment she voiced understanding of the date, time and location. All questions answered  Provider Scheduling Coord.  Gynecologic Oncology MA/PAR /Preceptor Aubrey Spann

## 2022-10-11 ENCOUNTER — OFFICE VISIT (OUTPATIENT)
Dept: GYNECOLOGIC ONCOLOGY | Facility: CLINIC | Age: 57
End: 2022-10-11
Payer: MEDICAID

## 2022-10-11 VITALS
BODY MASS INDEX: 34.26 KG/M2 | HEIGHT: 67 IN | DIASTOLIC BLOOD PRESSURE: 80 MMHG | WEIGHT: 218.25 LBS | SYSTOLIC BLOOD PRESSURE: 174 MMHG | HEART RATE: 80 BPM

## 2022-10-11 DIAGNOSIS — N94.89 ADNEXAL MASS: Primary | ICD-10-CM

## 2022-10-11 PROCEDURE — 99999 PR PBB SHADOW E&M-EST. PATIENT-LVL IV: ICD-10-PCS | Mod: PBBFAC,,, | Performed by: OBSTETRICS & GYNECOLOGY

## 2022-10-11 PROCEDURE — 1160F RVW MEDS BY RX/DR IN RCRD: CPT | Mod: CPTII,,, | Performed by: OBSTETRICS & GYNECOLOGY

## 2022-10-11 PROCEDURE — 1160F PR REVIEW ALL MEDS BY PRESCRIBER/CLIN PHARMACIST DOCUMENTED: ICD-10-PCS | Mod: CPTII,,, | Performed by: OBSTETRICS & GYNECOLOGY

## 2022-10-11 PROCEDURE — 3044F PR MOST RECENT HEMOGLOBIN A1C LEVEL <7.0%: ICD-10-PCS | Mod: CPTII,,, | Performed by: OBSTETRICS & GYNECOLOGY

## 2022-10-11 PROCEDURE — 3077F SYST BP >= 140 MM HG: CPT | Mod: CPTII,,, | Performed by: OBSTETRICS & GYNECOLOGY

## 2022-10-11 PROCEDURE — 99999 PR PBB SHADOW E&M-EST. PATIENT-LVL IV: CPT | Mod: PBBFAC,,, | Performed by: OBSTETRICS & GYNECOLOGY

## 2022-10-11 PROCEDURE — 4010F ACE/ARB THERAPY RXD/TAKEN: CPT | Mod: CPTII,,, | Performed by: OBSTETRICS & GYNECOLOGY

## 2022-10-11 PROCEDURE — 99024 PR POST-OP FOLLOW-UP VISIT: ICD-10-PCS | Mod: ,,, | Performed by: OBSTETRICS & GYNECOLOGY

## 2022-10-11 PROCEDURE — 1159F MED LIST DOCD IN RCRD: CPT | Mod: CPTII,,, | Performed by: OBSTETRICS & GYNECOLOGY

## 2022-10-11 PROCEDURE — 3066F PR DOCUMENTATION OF TREATMENT FOR NEPHROPATHY: ICD-10-PCS | Mod: CPTII,,, | Performed by: OBSTETRICS & GYNECOLOGY

## 2022-10-11 PROCEDURE — 3044F HG A1C LEVEL LT 7.0%: CPT | Mod: CPTII,,, | Performed by: OBSTETRICS & GYNECOLOGY

## 2022-10-11 PROCEDURE — 3066F NEPHROPATHY DOC TX: CPT | Mod: CPTII,,, | Performed by: OBSTETRICS & GYNECOLOGY

## 2022-10-11 PROCEDURE — 3061F NEG MICROALBUMINURIA REV: CPT | Mod: CPTII,,, | Performed by: OBSTETRICS & GYNECOLOGY

## 2022-10-11 PROCEDURE — 99214 OFFICE O/P EST MOD 30 MIN: CPT | Mod: PBBFAC | Performed by: OBSTETRICS & GYNECOLOGY

## 2022-10-11 PROCEDURE — 3008F PR BODY MASS INDEX (BMI) DOCUMENTED: ICD-10-PCS | Mod: CPTII,,, | Performed by: OBSTETRICS & GYNECOLOGY

## 2022-10-11 PROCEDURE — 99024 POSTOP FOLLOW-UP VISIT: CPT | Mod: ,,, | Performed by: OBSTETRICS & GYNECOLOGY

## 2022-10-11 PROCEDURE — 3077F PR MOST RECENT SYSTOLIC BLOOD PRESSURE >= 140 MM HG: ICD-10-PCS | Mod: CPTII,,, | Performed by: OBSTETRICS & GYNECOLOGY

## 2022-10-11 PROCEDURE — 4010F PR ACE/ARB THEARPY RXD/TAKEN: ICD-10-PCS | Mod: CPTII,,, | Performed by: OBSTETRICS & GYNECOLOGY

## 2022-10-11 PROCEDURE — 3061F PR NEG MICROALBUMINURIA RESULT DOCUMENTED/REVIEW: ICD-10-PCS | Mod: CPTII,,, | Performed by: OBSTETRICS & GYNECOLOGY

## 2022-10-11 PROCEDURE — 1159F PR MEDICATION LIST DOCUMENTED IN MEDICAL RECORD: ICD-10-PCS | Mod: CPTII,,, | Performed by: OBSTETRICS & GYNECOLOGY

## 2022-10-11 PROCEDURE — 3008F BODY MASS INDEX DOCD: CPT | Mod: CPTII,,, | Performed by: OBSTETRICS & GYNECOLOGY

## 2022-10-11 PROCEDURE — 3079F DIAST BP 80-89 MM HG: CPT | Mod: CPTII,,, | Performed by: OBSTETRICS & GYNECOLOGY

## 2022-10-11 PROCEDURE — 3079F PR MOST RECENT DIASTOLIC BLOOD PRESSURE 80-89 MM HG: ICD-10-PCS | Mod: CPTII,,, | Performed by: OBSTETRICS & GYNECOLOGY

## 2022-10-11 RX ORDER — VALSARTAN 320 MG/1
320 TABLET ORAL DAILY
Qty: 90 TABLET | Refills: 3 | Status: SHIPPED | OUTPATIENT
Start: 2022-10-11 | End: 2023-10-15

## 2022-10-11 RX ORDER — SEMAGLUTIDE 1.34 MG/ML
1 INJECTION, SOLUTION SUBCUTANEOUS
Qty: 6 PEN | Refills: 3 | Status: SHIPPED | OUTPATIENT
Start: 2022-10-11 | End: 2022-12-21 | Stop reason: SINTOL

## 2022-10-11 NOTE — Clinical Note
Thanks for sending.  Everything went well and she just had a cystadenofibroma, which is benign.  Let me know if you have questions.    DT

## 2022-10-19 ENCOUNTER — HOSPITAL ENCOUNTER (EMERGENCY)
Facility: OTHER | Age: 57
Discharge: HOME | End: 2022-10-19
Attending: EMERGENCY MEDICINE
Payer: MEDICAID

## 2022-10-19 VITALS
HEIGHT: 67 IN | WEIGHT: 214 LBS | SYSTOLIC BLOOD PRESSURE: 187 MMHG | DIASTOLIC BLOOD PRESSURE: 89 MMHG | BODY MASS INDEX: 33.59 KG/M2 | OXYGEN SATURATION: 99 % | RESPIRATION RATE: 18 BRPM | TEMPERATURE: 98 F | HEART RATE: 92 BPM

## 2022-10-19 DIAGNOSIS — Z12.12 SCREENING FOR COLORECTAL CANCER: Primary | ICD-10-CM

## 2022-10-19 DIAGNOSIS — R11.2 NAUSEA AND VOMITING, UNSPECIFIED VOMITING TYPE: Primary | ICD-10-CM

## 2022-10-19 DIAGNOSIS — Z12.11 SCREENING FOR COLORECTAL CANCER: Primary | ICD-10-CM

## 2022-10-19 DIAGNOSIS — R19.7 DIARRHEA, UNSPECIFIED TYPE: ICD-10-CM

## 2022-10-19 LAB
ALBUMIN SERPL BCP-MCNC: 3.5 G/DL (ref 3.5–5.2)
ALP SERPL-CCNC: 161 U/L (ref 55–135)
ALT SERPL W/O P-5'-P-CCNC: 24 U/L (ref 10–44)
ANION GAP SERPL CALC-SCNC: 7 MMOL/L (ref 8–16)
AST SERPL-CCNC: 36 U/L (ref 10–40)
BACTERIA #/AREA URNS HPF: ABNORMAL /HPF
BASOPHILS # BLD AUTO: 0.02 K/UL (ref 0–0.2)
BASOPHILS NFR BLD: 0.2 % (ref 0–1.9)
BILIRUB SERPL-MCNC: 0.6 MG/DL (ref 0.1–1)
BILIRUB UR QL STRIP: NEGATIVE
BUN SERPL-MCNC: 13 MG/DL (ref 6–20)
CALCIUM SERPL-MCNC: 9.3 MG/DL (ref 8.7–10.5)
CHLORIDE SERPL-SCNC: 110 MMOL/L (ref 95–110)
CLARITY UR: CLEAR
CO2 SERPL-SCNC: 24 MMOL/L (ref 23–29)
COLOR UR: YELLOW
CREAT SERPL-MCNC: 0.8 MG/DL (ref 0.5–1.4)
DIFFERENTIAL METHOD: ABNORMAL
EOSINOPHIL # BLD AUTO: 0.4 K/UL (ref 0–0.5)
EOSINOPHIL NFR BLD: 4.6 % (ref 0–8)
ERYTHROCYTE [DISTWIDTH] IN BLOOD BY AUTOMATED COUNT: 13.4 % (ref 11.5–14.5)
EST. GFR  (NO RACE VARIABLE): >60 ML/MIN/1.73 M^2
GLUCOSE SERPL-MCNC: 105 MG/DL (ref 70–110)
GLUCOSE UR QL STRIP: NEGATIVE
HCT VFR BLD AUTO: 36.7 % (ref 37–48.5)
HGB BLD-MCNC: 12.3 G/DL (ref 12–16)
HGB UR QL STRIP: NEGATIVE
IMM GRANULOCYTES # BLD AUTO: 0.04 K/UL (ref 0–0.04)
IMM GRANULOCYTES NFR BLD AUTO: 0.4 % (ref 0–0.5)
KETONES UR QL STRIP: ABNORMAL
LEUKOCYTE ESTERASE UR QL STRIP: ABNORMAL
LIPASE SERPL-CCNC: 18 U/L (ref 4–60)
LYMPHOCYTES # BLD AUTO: 2 K/UL (ref 1–4.8)
LYMPHOCYTES NFR BLD: 21.9 % (ref 18–48)
MCH RBC QN AUTO: 32.9 PG (ref 27–31)
MCHC RBC AUTO-ENTMCNC: 33.5 G/DL (ref 32–36)
MCV RBC AUTO: 98 FL (ref 82–98)
MICROSCOPIC COMMENT: ABNORMAL
MONOCYTES # BLD AUTO: 0.9 K/UL (ref 0.3–1)
MONOCYTES NFR BLD: 9.7 % (ref 4–15)
NEUTROPHILS # BLD AUTO: 5.8 K/UL (ref 1.8–7.7)
NEUTROPHILS NFR BLD: 63.2 % (ref 38–73)
NITRITE UR QL STRIP: NEGATIVE
NRBC BLD-RTO: 0 /100 WBC
PH UR STRIP: 6 [PH] (ref 5–8)
PLATELET # BLD AUTO: 196 K/UL (ref 150–450)
PMV BLD AUTO: 11.9 FL (ref 9.2–12.9)
POTASSIUM SERPL-SCNC: 4.6 MMOL/L (ref 3.5–5.1)
PROT SERPL-MCNC: 8.2 G/DL (ref 6–8.4)
PROT UR QL STRIP: NEGATIVE
RBC # BLD AUTO: 3.74 M/UL (ref 4–5.4)
RBC #/AREA URNS HPF: 2 /HPF (ref 0–4)
SODIUM SERPL-SCNC: 141 MMOL/L (ref 136–145)
SP GR UR STRIP: 1.02 (ref 1–1.03)
SQUAMOUS #/AREA URNS HPF: 5 /HPF
URN SPEC COLLECT METH UR: ABNORMAL
UROBILINOGEN UR STRIP-ACNC: 1 EU/DL
WBC # BLD AUTO: 9.16 K/UL (ref 3.9–12.7)
WBC #/AREA URNS HPF: 11 /HPF (ref 0–5)

## 2022-10-19 PROCEDURE — 80053 COMPREHEN METABOLIC PANEL: CPT | Performed by: NURSE PRACTITIONER

## 2022-10-19 PROCEDURE — 99285 EMERGENCY DEPT VISIT HI MDM: CPT | Mod: 25

## 2022-10-19 PROCEDURE — 96360 HYDRATION IV INFUSION INIT: CPT

## 2022-10-19 PROCEDURE — 25000003 PHARM REV CODE 250: Performed by: PHYSICIAN ASSISTANT

## 2022-10-19 PROCEDURE — 85025 COMPLETE CBC W/AUTO DIFF WBC: CPT | Performed by: NURSE PRACTITIONER

## 2022-10-19 PROCEDURE — 25500020 PHARM REV CODE 255: Performed by: PHYSICIAN ASSISTANT

## 2022-10-19 PROCEDURE — 81000 URINALYSIS NONAUTO W/SCOPE: CPT | Performed by: NURSE PRACTITIONER

## 2022-10-19 PROCEDURE — 87086 URINE CULTURE/COLONY COUNT: CPT | Performed by: NURSE PRACTITIONER

## 2022-10-19 PROCEDURE — 96361 HYDRATE IV INFUSION ADD-ON: CPT

## 2022-10-19 PROCEDURE — 83690 ASSAY OF LIPASE: CPT | Performed by: NURSE PRACTITIONER

## 2022-10-19 RX ORDER — ONDANSETRON 4 MG/1
4 TABLET, ORALLY DISINTEGRATING ORAL EVERY 6 HOURS PRN
Qty: 30 TABLET | Refills: 0 | Status: SHIPPED | OUTPATIENT
Start: 2022-10-19 | End: 2023-06-21

## 2022-10-19 RX ORDER — PROMETHAZINE HYDROCHLORIDE 12.5 MG/1
12.5 TABLET ORAL EVERY 6 HOURS PRN
Qty: 12 TABLET | Refills: 0 | Status: SHIPPED | OUTPATIENT
Start: 2022-10-19 | End: 2023-06-21

## 2022-10-19 RX ADMIN — SODIUM CHLORIDE 1000 ML: 0.9 INJECTION, SOLUTION INTRAVENOUS at 02:10

## 2022-10-19 RX ADMIN — IOHEXOL 100 ML: 350 INJECTION, SOLUTION INTRAVENOUS at 03:10

## 2022-10-19 NOTE — ED PROVIDER NOTES
"Encounter Date: 10/19/2022       History     Chief Complaint   Patient presents with    Abdominal Pain    Diarrhea    Nausea     Patient reports intermittent abdominal pain with nausea /vomiting  x 2 mos with new onset of diarrhea 2 days ago and dark color noted to urine . Patient denies any urinary burning / frequency . Hx of Non-alcoholic Cirrhosis of the Liver      56 y/o female with history of Dm type II, HTN, psoriasis, presents to the ER with chief complaint of intermittent nausea x 3-4 months.  She has vomiting x 1 week and diarrhea x 2 days.  She reports acidic taste in mouth and vomiting is often induced by certain smells causing gagging.  She has been able to tolerate food today and last had vomiting last night.  She feels full easily, but denies increase in pain with eating.  She had 6 episodes of diarrhea yesterday and 1 episode today - stool is brown/yellow.  She reports cramping "knot" in mid epigastric area during episodes of vomiting. She denies hematemesis. She denies current abdominal pain. She denies fever , chest pain or SOB.    Patient had full hysterectomy September 11 with removal of left ovarian mass,     She has intermittent pain in the right upper abdomen, sticking pains intermittent for approximately 1-2 months.       Review of patient's allergies indicates:  No Known Allergies  Past Medical History:   Diagnosis Date    Arthritis     Diabetes mellitus     Diabetes mellitus, type 2     Essential (primary) hypertension     Fatty liver     Hyperlipidemia     Hypertension     Hyperthyroidism     Hypothyroidism     Psoriasis     Spleen enlarged      Past Surgical History:   Procedure Laterality Date    ROBOT-ASSISTED LAPAROSCOPIC LYSIS OF ADHESIONS USING DA REED XI N/A 9/12/2022    Procedure: XI ROBOTIC LYSIS, ADHESIONS;  Surgeon: Silvestre Umaña MD;  Location: [facility];  Service: Oncology;  Laterality: N/A;    THYROIDECTOMY, PARTIAL      XI ROBOTIC HYSTERECTOMY, WITH SALPINGO-OOPHORECTOMY " Bilateral 9/12/2022    Procedure: XI ROBOTIC HYSTERECTOMY,WITH SALPINGO-OOPHORECTOMY;  Surgeon: Silvestre Umaña MD;  Location: [facility];  Service: Oncology;  Laterality: Bilateral;     Family History   Problem Relation Age of Onset    Cancer Mother         liver    Liver cancer Mother     Cirrhosis Mother         CARTER    Cancer Sister         breast    Breast cancer Sister     Cancer Brother     Breast cancer Cousin     Breast cancer Cousin     Colon cancer Neg Hx     Ovarian cancer Neg Hx      Social History     Tobacco Use    Smoking status: Never    Smokeless tobacco: Never   Substance Use Topics    Alcohol use: Not Currently    Drug use: Not Currently     Review of Systems   Constitutional:  Positive for appetite change. Negative for chills and fever.   HENT:  Negative for sore throat.    Respiratory:  Negative for shortness of breath.    Cardiovascular:  Negative for chest pain.   Gastrointestinal:  Positive for abdominal pain, diarrhea, nausea and vomiting.   Genitourinary:  Negative for dysuria.   Musculoskeletal:  Negative for back pain.   Skin:  Negative for rash.   Neurological:  Negative for dizziness, syncope, weakness and light-headedness.   Hematological:  Does not bruise/bleed easily.     Physical Exam     Initial Vitals [10/19/22 1226]   BP Pulse Resp Temp SpO2   (!) 144/82 97 16 97.9 °F (36.6 °C) 97 %      MAP       --         Physical Exam    Nursing note and vitals reviewed.  Constitutional: She appears well-developed and well-nourished.   HENT:   Head: Atraumatic.   Eyes: Conjunctivae and EOM are normal. Pupils are equal, round, and reactive to light.   Neck: Neck supple.   Normal range of motion.  Cardiovascular:  Normal rate and regular rhythm.           Pulmonary/Chest: Breath sounds normal. No respiratory distress. She has no wheezes. She has no rhonchi. She has no rales.   Abdominal: Abdomen is soft. Bowel sounds are normal. She exhibits no distension. There is abdominal tenderness (mild RUQ).  There is no rebound and no guarding.   Musculoskeletal:      Cervical back: Normal range of motion and neck supple.     Neurological: She is alert and oriented to person, place, and time. She has normal strength. GCS score is 15. GCS eye subscore is 4. GCS verbal subscore is 5. GCS motor subscore is 6.   Skin: No rash noted.   Psychiatric: She has a normal mood and affect.       ED Course   Procedures  Labs Reviewed   CBC W/ AUTO DIFFERENTIAL - Abnormal; Notable for the following components:       Result Value    RBC 3.74 (*)     Hematocrit 36.7 (*)     MCH 32.9 (*)     All other components within normal limits   COMPREHENSIVE METABOLIC PANEL - Abnormal; Notable for the following components:    Alkaline Phosphatase 161 (*)     Anion Gap 7 (*)     All other components within normal limits   URINALYSIS, REFLEX TO URINE CULTURE - Abnormal; Notable for the following components:    Ketones, UA Trace (*)     Leukocytes, UA 1+ (*)     All other components within normal limits    Narrative:     Specimen Source->Urine   URINALYSIS MICROSCOPIC - Abnormal; Notable for the following components:    WBC, UA 11 (*)     Bacteria Moderate (*)     All other components within normal limits    Narrative:     Specimen Source->Urine   CULTURE, URINE   LIPASE          Imaging Results              CT Abdomen Pelvis With Contrast (Final result)  Result time 10/19/22 16:23:18      Final result by Montana Baltazar MD (10/19/22 16:23:18)                   Impression:      No acute abdominal process or evidence of postoperative complication.    Cirrhotic morphology of the liver and mild splenomegaly.    Cholelithiasis without evidence of acute cholecystitis.      Electronically signed by: Montana Baltazar MD  Date:    10/19/2022  Time:    16:23               Narrative:    EXAMINATION:  CT ABDOMEN PELVIS WITH CONTRAST    CLINICAL HISTORY:  Nausea/vomiting;RUQ and mid epigastric abdominal pain, recent hysterectomy;    TECHNIQUE:  Low dose  axial images, sagittal and coronal reformations were obtained from the lung bases to the pubic symphysis following the IV administration of 100 mL of Omnipaque 350 .  Oral contrast was not given.    COMPARISON:  07/13/2022    FINDINGS:  Limited evaluation lung bases shows linear atelectasis at the right lung base.    The liver is slightly enlarged and demonstrates a nodular contour.  No focal mass identified.  No biliary ductal dilatation.  The gallbladder contains gallstones.  No evidence of acute cholecystitis.    The spleen is enlarged.  Adrenal glands and pancreas show no focal abnormality.    Bilateral kidneys show no solid mass or hydronephrosis.  Urinary bladder is decompressed.  The uterus is absent.  The previously identified cystic mass in the pelvis is no longer identified.    The gastrointestinal tract shows no wall thickening or obstruction.  A normal appendix is present.  There are scattered colonic diverticula.  No evidence of acute diverticulitis.  No free fluid or free gas.  There is a small hiatal hernia.  No concerning lymphadenopathy.    There is mild atherosclerosis Without aneurysm.there are degenerative changes of the lumbar spine without an acute fracture.                                       Medications   sodium chloride 0.9% bolus 1,000 mL (0 mLs Intravenous Stopped 10/19/22 1734)   iohexoL (OMNIPAQUE 350) injection 100 mL (100 mLs Intravenous Given 10/19/22 1546)           APC / Resident Notes:   Patient presents to the ER with complaint of intermittent nausea x 3-4 months, intermittent vomiting returning x 2 weeks and diarrhea x 2 days.  I reviewed the patients chart.   Patient had CT scan and U/S 7/13/2022 during ED visit.  She has since had Patient had full hysterectomy September 11 with removal of left ovarian mass,   She was referred to hepatology due to CT findings concerning for appearance of cirrhosis.      Patient seen by Hepatology Dr. Barger 2 weeks ago.  Per clinic note patient  "noted to have "Cirrhosis, possibly related to methotrexate.  Will get liver biopsy.   Splenomegaly 2/2 cirrhosis. Transplant option not discussed, will evaluate when MELD >15." Per Dr. Barger.      Patient denies current abdominal pain, diarrhea has decreased to 1 episode today and she has not required zofran today.  She denies fever, she is afebrile with stable vital signs, non toxic and appears comfortable in the ED.    Will order labs, provide IV hydration, and obtain CT abdomen/pelvis due to intermittent abdominal pain, recent hysterectomy, diarrhea and mild RUQ abdominal tenderness on exam.  I have considered SBO, diverticulitis, pancreatitis, dehydration , URI, electrolyte abnormalities.    Patients labs are largely WNL, Alk phos 161 elevated but improved from labs 2 weeks ago and remainder of LFTs and CMP otherwise WNL. Normal lipase.No leukocytosis.  She is hemodynamically stable, denies hematemesis, melena or bloody stool.    Urine culture  with 11 WBC, contaminated with 5 squamous cells, urine culture is pending - will add antibiotics if positive.   CT shows " No acute abdominal process or evidence of postoperative complication.  Cirrhotic morphology of the liver and mild splenomegaly.  Cholelithiasis without evidence of acute cholecystitis."    Patient declined antiemetics or pain medications in the ED as she does not currently have pain.  Able to tolerate liquids.  She does have weight loss over the last month (9 pounds).  She is scheduled for follow up EGD and I have also advised she may need colonoscopy.  She will also contact hepatology to schedule her liver biopsy.     I discussed the care of this patient with my supervising MD.  We find she is stable for discharge with plan for close follow up with PCP, hepatology, rheumatology - she is given ER return precautions and is comfortable with discharge plan.                     Clinical Impression:   Final diagnoses:  [R11.2] Nausea and vomiting, " unspecified vomiting type (Primary)  [R19.7] Diarrhea, unspecified type      ED Disposition Condition    Discharge Stable          ED Prescriptions       Medication Sig Dispense Start Date End Date Auth. Provider    promethazine (PHENERGAN) 12.5 MG Tab Take 1 tablet (12.5 mg total) by mouth every 6 (six) hours as needed for Nausea (nausea/vomiting). 12 tablet 10/19/2022 -- BAN Gilbert          Follow-up Information       Follow up With Specialties Details Why Contact Info    Reyna Duval MD Internal Medicine Schedule an appointment as soon as possible for a visit in 1 week  [address] [facility] [place] [address]  [phone]      Nelda Barger MD Transplant, Hepatology, Gastroenterology Schedule an appointment as soon as possible for a visit in 1 week  [address] [facility] [address]  [phone]      [facility] Emergency Dept Emergency Medicine  If symptoms worsen [address] [facility] [id number] [phone]             BAN Gilbert  10/20/22 0841       BAN Gilbert  10/20/22 0847

## 2022-10-19 NOTE — FIRST PROVIDER EVALUATION
Emergency Department TeleTriage Encounter Note      CHIEF COMPLAINT    Chief Complaint   Patient presents with    Abdominal Pain    Diarrhea    Nausea     Patient reports intermittent abdominal pain with nausea /vomiting  x 2 mos with new onset of diarrhea 2 days ago and dark color noted to urine . Patient denies any urinary burning / frequency . Hx of Non-alcoholic Cirrhosis of the Liver        VITAL SIGNS   Initial Vitals [10/19/22 1226]   BP Pulse Resp Temp SpO2   (!) 144/82 97 16 97.9 °F (36.6 °C) 97 %      MAP       --            ALLERGIES    Review of patient's allergies indicates:  No Known Allergies    PROVIDER TRIAGE NOTE  This is a teletriage evaluation of a 57 y.o. female presenting to the ED with c/o ABD pain, Nausea, & Vomiting since July.  Worsened yesterday with diarrhea. Limited physical exam via telehealth: The patient is awake, alert, answering questions appropriately and is not in respiratory distress. Initial orders will be placed and care will be transferred to an alternate provider when patient is roomed for a full evaluation. Any additional orders and the final disposition will be determined by that provider.         ORDERS  Labs Reviewed - No data to display    ED Orders (720h ago, onward)      Start Ordered     Status Ordering Provider    10/19/22 1310 10/19/22 1309  Vital signs  Every 2 hours         Ordered TOM OWENS    10/19/22 1309 10/19/22 1309  Diet NPO  Diet effective now         Ordered TOM OWENS    10/19/22 1309 10/19/22 1309  Insert peripheral IV  Once         Ordered TOM OWENS    10/19/22 1309 10/19/22 1309  CBC W/ AUTO DIFFERENTIAL  STAT         Ordered TOM OWENS    10/19/22 1309 10/19/22 1309  Comp. Metabolic Panel  STAT         Ordered TOM OWENS    10/19/22 1309 10/19/22 1309  ISTAT CHEM8  Once         Ordered TOM OWENS    10/19/22 1309 10/19/22 1309  Lipase  STAT         Ordered TOM OWENS    10/19/22 1309 10/19/22 1309  Urinalysis, Reflex to Urine  Culture Urine, Clean Catch  STAT         Ordered TOM OWENS A              Virtual Visit Note: The provider triage portion of this emergency department evaluation and documentation was performed via [employer], a HIPAA-compliant telemedicine application, in concert with a tele-presenter in the room. A face to face patient evaluation with one of my colleagues will occur once the patient is placed in an emergency department room.      DISCLAIMER: This note was prepared with [employer] voice recognition transcription software. Garbled syntax, mangled pronouns, and other bizarre constructions may be attributed to that software system.

## 2022-10-19 NOTE — ED TRIAGE NOTES
Pt presents to ED c/o abd pain, nausea, vomiting  x2 mos. With diarrhea onset 2 days ago. Denies urinary symptoms or fever at home. Hx of non-alc. Cirrhosis of liver.

## 2022-10-19 NOTE — DISCHARGE INSTRUCTIONS
You have a new prescription for promethazine, you may take this instead of Zofran for nausea and vomiting if needed.

## 2022-10-20 ENCOUNTER — TELEPHONE (OUTPATIENT)
Dept: EMERGENCY MEDICINE | Facility: OTHER | Age: 57
End: 2022-10-20
Payer: MEDICAID

## 2022-10-20 ENCOUNTER — TELEPHONE (OUTPATIENT)
Dept: HEPATOLOGY | Facility: CLINIC | Age: 57
End: 2022-10-20
Payer: MEDICAID

## 2022-10-20 RX ORDER — PANTOPRAZOLE SODIUM 20 MG/1
20 TABLET, DELAYED RELEASE ORAL DAILY
Qty: 14 TABLET | Refills: 0 | Status: SHIPPED | OUTPATIENT
Start: 2022-10-20 | End: 2023-10-18 | Stop reason: DRUGHIGH

## 2022-10-20 NOTE — TELEPHONE ENCOUNTER
----- Message from Марина Chen sent at 10/20/2022  9:32 AM CDT -----  Regarding: Speak with office  Contact: Parris Marrero is calling to found out when orders for biopsy will be set-up.    [phone]

## 2022-10-21 ENCOUNTER — PATIENT OUTREACH (OUTPATIENT)
Dept: ADMINISTRATIVE | Facility: HOSPITAL | Age: 57
End: 2022-10-21
Payer: MEDICAID

## 2022-10-21 ENCOUNTER — PATIENT MESSAGE (OUTPATIENT)
Dept: ADMINISTRATIVE | Facility: HOSPITAL | Age: 57
End: 2022-10-21
Payer: MEDICAID

## 2022-10-21 ENCOUNTER — TELEPHONE (OUTPATIENT)
Dept: HEPATOLOGY | Facility: CLINIC | Age: 57
End: 2022-10-21
Payer: MEDICAID

## 2022-10-21 ENCOUNTER — TELEPHONE (OUTPATIENT)
Dept: GASTROENTEROLOGY | Facility: CLINIC | Age: 57
End: 2022-10-21
Payer: MEDICAID

## 2022-10-21 LAB
BACTERIA UR CULT: NORMAL
BACTERIA UR CULT: NORMAL

## 2022-10-21 NOTE — TELEPHONE ENCOUNTER
----- Message from Ventura Ely sent at 10/21/2022 11:16 AM CDT -----  Regarding: call back  Pt is calling to found out when orders for biopsy will be set-up.pt states she waiting for call     [phone]

## 2022-10-21 NOTE — TELEPHONE ENCOUNTER
I called Mrs. Guevara to let her know we do not have any open slots available right now. I put her on a waiting list in case something opens. Since, she is not an established patient, it was advise to contact PCP until she can be seen by us.

## 2022-10-24 ENCOUNTER — TELEPHONE (OUTPATIENT)
Dept: HEPATOLOGY | Facility: CLINIC | Age: 57
End: 2022-10-24
Payer: MEDICAID

## 2022-10-24 DIAGNOSIS — K74.69 OTHER CIRRHOSIS OF LIVER: Primary | ICD-10-CM

## 2022-10-31 ENCOUNTER — TELEPHONE (OUTPATIENT)
Dept: INTERVENTIONAL RADIOLOGY/VASCULAR | Facility: CLINIC | Age: 57
End: 2022-10-31
Payer: MEDICAID

## 2022-10-31 ENCOUNTER — TELEPHONE (OUTPATIENT)
Dept: HEPATOLOGY | Facility: CLINIC | Age: 57
End: 2022-10-31
Payer: MEDICAID

## 2022-10-31 DIAGNOSIS — R74.8 ELEVATED ALKALINE PHOSPHATASE LEVEL: Primary | ICD-10-CM

## 2022-10-31 NOTE — TELEPHONE ENCOUNTER
----- Message from Ventura Ely sent at 10/31/2022  9:37 AM CDT -----  Regarding: call back  Pt call to speak with someone in regards to her appt    Call

## 2022-10-31 NOTE — TELEPHONE ENCOUNTER
Spoke to pt on phone,  Pt is scheduled on 11/10/2022 for IR procedure.  Preop instructions given, pt verbally understood. Pt aware and confirmed, Thanks

## 2022-10-31 NOTE — TELEPHONE ENCOUNTER
Left message for pt to return my call. Need to schedule IR procedure. Please forward call to [phone]. Thanks

## 2022-11-01 ENCOUNTER — LAB VISIT (OUTPATIENT)
Dept: LAB | Facility: HOSPITAL | Age: 57
End: 2022-11-01
Payer: MEDICAID

## 2022-11-01 DIAGNOSIS — R74.8 ELEVATED ALKALINE PHOSPHATASE LEVEL: ICD-10-CM

## 2022-11-01 LAB
INR PPP: 1.1 (ref 0.8–1.2)
PROTHROMBIN TIME: 11.4 SEC (ref 9–12.5)

## 2022-11-01 PROCEDURE — 85610 PROTHROMBIN TIME: CPT | Performed by: FAMILY MEDICINE

## 2022-11-01 PROCEDURE — 36415 COLL VENOUS BLD VENIPUNCTURE: CPT | Mod: PO | Performed by: FAMILY MEDICINE

## 2022-11-09 ENCOUNTER — TELEPHONE (OUTPATIENT)
Dept: INTERVENTIONAL RADIOLOGY/VASCULAR | Facility: HOSPITAL | Age: 57
End: 2022-11-09
Payer: MEDICAID

## 2022-11-10 ENCOUNTER — HOSPITAL ENCOUNTER (OUTPATIENT)
Dept: INTERVENTIONAL RADIOLOGY/VASCULAR | Facility: HOSPITAL | Age: 57
Discharge: HOME | End: 2022-11-10
Attending: INTERNAL MEDICINE
Payer: MEDICAID

## 2022-11-10 ENCOUNTER — TELEPHONE (OUTPATIENT)
Dept: ENDOSCOPY | Facility: HOSPITAL | Age: 57
End: 2022-11-10
Payer: MEDICAID

## 2022-11-10 VITALS
HEIGHT: 67 IN | WEIGHT: 214 LBS | DIASTOLIC BLOOD PRESSURE: 74 MMHG | HEART RATE: 86 BPM | RESPIRATION RATE: 22 BRPM | OXYGEN SATURATION: 100 % | SYSTOLIC BLOOD PRESSURE: 166 MMHG | BODY MASS INDEX: 33.59 KG/M2 | TEMPERATURE: 98 F

## 2022-11-10 DIAGNOSIS — K74.69 OTHER CIRRHOSIS OF LIVER: ICD-10-CM

## 2022-11-10 DIAGNOSIS — Z12.11 SCREEN FOR COLON CANCER: Primary | ICD-10-CM

## 2022-11-10 LAB — POCT GLUCOSE: 111 MG/DL (ref 70–110)

## 2022-11-10 PROCEDURE — 88307 PR  SURG PATH,LEVEL V: ICD-10-PCS | Mod: 26,,, | Performed by: PATHOLOGY

## 2022-11-10 PROCEDURE — 37200 TRANSCATHETER BIOPSY: CPT | Mod: ,,, | Performed by: RADIOLOGY

## 2022-11-10 PROCEDURE — 63600175 PHARM REV CODE 636 W HCPCS: Performed by: RADIOLOGY

## 2022-11-10 PROCEDURE — 75825 VEIN X-RAY TRUNK: CPT | Mod: 26,59,, | Performed by: RADIOLOGY

## 2022-11-10 PROCEDURE — 25500020 PHARM REV CODE 255: Performed by: INTERNAL MEDICINE

## 2022-11-10 PROCEDURE — 75970 VASCULAR BIOPSY: CPT | Mod: TC | Performed by: RADIOLOGY

## 2022-11-10 PROCEDURE — A4550 SURGICAL TRAYS: HCPCS

## 2022-11-10 PROCEDURE — 88313 SPECIAL STAINS GROUP 2: CPT | Performed by: PATHOLOGY

## 2022-11-10 PROCEDURE — 75889 VEIN X-RAY LIVER W/HEMODYNAM: CPT | Mod: 26,59,, | Performed by: RADIOLOGY

## 2022-11-10 PROCEDURE — 25000003 PHARM REV CODE 250: Performed by: RADIOLOGY

## 2022-11-10 PROCEDURE — 88307 TISSUE EXAM BY PATHOLOGIST: CPT | Performed by: PATHOLOGY

## 2022-11-10 PROCEDURE — 75970 IR TRANSJUGULAR LIVER BIOPSY: ICD-10-PCS | Mod: 26,,, | Performed by: RADIOLOGY

## 2022-11-10 PROCEDURE — 76937 IR TRANSJUGULAR LIVER BIOPSY: ICD-10-PCS | Mod: 26,,, | Performed by: RADIOLOGY

## 2022-11-10 PROCEDURE — 88313 SPECIAL STAINS GROUP 2: CPT | Mod: 26,,, | Performed by: PATHOLOGY

## 2022-11-10 PROCEDURE — 36011 IR TRANSJUGULAR LIVER BIOPSY: ICD-10-PCS | Mod: 51,RT,, | Performed by: RADIOLOGY

## 2022-11-10 PROCEDURE — 36011 PLACE CATHETER IN VEIN: CPT | Mod: RT | Performed by: RADIOLOGY

## 2022-11-10 PROCEDURE — 88307 TISSUE EXAM BY PATHOLOGIST: CPT | Mod: 26,,, | Performed by: PATHOLOGY

## 2022-11-10 PROCEDURE — 75970 VASCULAR BIOPSY: CPT | Mod: 26,,, | Performed by: RADIOLOGY

## 2022-11-10 PROCEDURE — 75825 IR TRANSJUGULAR LIVER BIOPSY: ICD-10-PCS | Mod: 26,59,, | Performed by: RADIOLOGY

## 2022-11-10 PROCEDURE — 88313 PR  SPECIAL STAINS,GROUP II: ICD-10-PCS | Mod: 26,,, | Performed by: PATHOLOGY

## 2022-11-10 PROCEDURE — 37200 IR TRANSJUGULAR LIVER BIOPSY: ICD-10-PCS | Mod: ,,, | Performed by: RADIOLOGY

## 2022-11-10 PROCEDURE — C1729 CATH, DRAINAGE: HCPCS

## 2022-11-10 PROCEDURE — 75889 IR TRANSJUGULAR LIVER BIOPSY: ICD-10-PCS | Mod: 26,59,, | Performed by: RADIOLOGY

## 2022-11-10 PROCEDURE — 76937 US GUIDE VASCULAR ACCESS: CPT | Mod: 26,,, | Performed by: RADIOLOGY

## 2022-11-10 RX ORDER — SODIUM, POTASSIUM,MAG SULFATES 17.5-3.13G
1 SOLUTION, RECONSTITUTED, ORAL ORAL DAILY
Qty: 1 KIT | Refills: 0 | Status: SHIPPED | OUTPATIENT
Start: 2022-11-10 | End: 2022-11-12

## 2022-11-10 RX ORDER — LIDOCAINE HYDROCHLORIDE 10 MG/ML
1 INJECTION, SOLUTION EPIDURAL; INFILTRATION; INTRACAUDAL; PERINEURAL ONCE
Status: DISCONTINUED | OUTPATIENT
Start: 2022-11-10 | End: 2022-11-11 | Stop reason: HOSPADM

## 2022-11-10 RX ORDER — SODIUM CHLORIDE 9 MG/ML
75 INJECTION, SOLUTION INTRAVENOUS CONTINUOUS
Status: DISCONTINUED | OUTPATIENT
Start: 2022-11-10 | End: 2022-11-11 | Stop reason: HOSPADM

## 2022-11-10 RX ORDER — FENTANYL CITRATE 50 UG/ML
INJECTION, SOLUTION INTRAMUSCULAR; INTRAVENOUS
Status: COMPLETED | OUTPATIENT
Start: 2022-11-10 | End: 2022-11-10

## 2022-11-10 RX ORDER — MIDAZOLAM HYDROCHLORIDE 1 MG/ML
INJECTION INTRAMUSCULAR; INTRAVENOUS
Status: COMPLETED | OUTPATIENT
Start: 2022-11-10 | End: 2022-11-10

## 2022-11-10 RX ORDER — LIDOCAINE HYDROCHLORIDE 10 MG/ML
INJECTION INFILTRATION; PERINEURAL
Status: COMPLETED | OUTPATIENT
Start: 2022-11-10 | End: 2022-11-10

## 2022-11-10 RX ADMIN — IOHEXOL 20 ML: 300 INJECTION, SOLUTION INTRAVENOUS at 09:11

## 2022-11-10 RX ADMIN — FENTANYL CITRATE 50 MCG: 50 INJECTION, SOLUTION INTRAMUSCULAR; INTRAVENOUS at 08:11

## 2022-11-10 RX ADMIN — MIDAZOLAM HYDROCHLORIDE 1 MG: 1 INJECTION INTRAMUSCULAR; INTRAVENOUS at 08:11

## 2022-11-10 RX ADMIN — LIDOCAINE HYDROCHLORIDE 5 ML: 10 INJECTION, SOLUTION INFILTRATION; PERINEURAL at 08:11

## 2022-11-10 NOTE — PLAN OF CARE
X-JUG complete. Pt tolerated well. VSS. No signs or symptoms of distress noted.  Pt will be transferred to ROCU bed and report to be given at bedside.

## 2022-11-10 NOTE — TELEPHONE ENCOUNTER
Pt is scheduled with Dr. Duran on 2nd floor endoscopy on 11/16/22 for EGD/colonoscopy. Prep instructions reviewed with Pt and on Pt portal, Pt verbalized understanding.

## 2022-11-10 NOTE — PLAN OF CARE
Pt arrived to Interventional Radiology room 190 via stretcher for X-JUG.  Plan of care reviewed with patient, patient verbalized understanding.  Name verified using two identifiers.  Allergies verified.  Labs, orders and consent reviewed on chart.  Pt oriented to unit and staff.  See flow sheet for documentation, monitoring and medication administration. Will continue to monitor.

## 2022-11-10 NOTE — DISCHARGE INSTRUCTIONS
[facility] [phone] (MON-FRI 8 AM- 5PM). Radiology Resident on call [phone].

## 2022-11-10 NOTE — PLAN OF CARE
2hr IR recovery complete. VSS.  Dressing CDI. IV removed. Discharge instructions reviewed and given.  Pt. Ready for transport via ambulation, steady gait observed, to private vehicle with .

## 2022-11-10 NOTE — H&P
Radiology History & Physical      SUBJECTIVE:     Chief Complaint: Elevated LFT's.    History of Present Illness:  Parris Guevara is a 57 y.o. female w/ pmhx of HTN, HLD, T2DM, and Hep A. She has had elevated LFT's and a CT with diffuse nodular contour of the liver which can be seen with underlying cirrhosis and splenomegaly. She presents for transjugular liver biopsy.     Past Medical History:   Diagnosis Date    Arthritis     Diabetes mellitus     Diabetes mellitus, type 2     Essential (primary) hypertension     Fatty liver     Hyperlipidemia     Hypertension     Hyperthyroidism     Hypothyroidism     Psoriasis     Spleen enlarged      Past Surgical History:   Procedure Laterality Date    ROBOT-ASSISTED LAPAROSCOPIC LYSIS OF ADHESIONS USING DA REED XI N/A 9/12/2022    Procedure: XI ROBOTIC LYSIS, ADHESIONS;  Surgeon: Silvestre Umaña MD;  Location: [facility] OR;  Service: Oncology;  Laterality: N/A;    THYROIDECTOMY, PARTIAL      XI ROBOTIC HYSTERECTOMY, WITH SALPINGO-OOPHORECTOMY Bilateral 9/12/2022    Procedure: XI ROBOTIC HYSTERECTOMY,WITH SALPINGO-OOPHORECTOMY;  Surgeon: Silvestre Umaña MD;  Location: [facility] OR;  Service: Oncology;  Laterality: Bilateral;       Home Meds:   Prior to Admission medications    Medication Sig Start Date End Date Taking? Authorizing Provider   atorvastatin (LIPITOR) 40 MG tablet Take 40 mg by mouth once daily. 6/8/22  Yes Historical Provider   ergocalciferol (ERGOCALCIFEROL) 50,000 unit Cap Take 50,000 Units by mouth every 7 days.   Yes Historical Provider   folic acid (FOLVITE) 1 MG tablet Take 1,000 mcg by mouth once daily. 5/30/22  Yes Historical Provider   levothyroxine (SYNTHROID) 125 MCG tablet Take 1 tablet (125 mcg total) by mouth once daily. 9/21/22  Yes Reyna Duval MD   metFORMIN (GLUCOPHAGE-XR) 500 MG ER 24hr tablet Take 1,000 mg by mouth every morning. 6/8/22  Yes Historical Provider   ondansetron (ZOFRAN) 4 MG tablet Take by mouth 2 (two) times daily. 7/13/22  Yes  Historical Provider   semaglutide (OZEMPIC) 1 mg/dose (2 mg/1.5 mL) PnIj Inject 1 mg into the skin every 7 days. 10/11/22  Yes Reyna Duval MD   valsartan (DIOVAN) 320 MG tablet Take 1 tablet (320 mg total) by mouth once daily. 10/11/22 10/11/23 Yes Reyna Duval MD   atorvastatin (LIPITOR) 20 MG tablet Take 20 mg by mouth once daily.    Historical Provider   celecoxib (CELEBREX) 200 MG capsule Take 200 mg by mouth once daily. 5/30/22   Historical Provider   ergocalciferol (ERGOCALCIFEROL) 50,000 unit Cap Take 50,000 Units by mouth every 7 days. 4/21/22   Historical Provider   metFORMIN (GLUCOPHAGE-XR) 500 MG ER 24hr tablet Take 1,000 mg by mouth. 7/21/22   Historical Provider   methotrexate 2.5 MG Tab Take by mouth every 7 days.    Historical Provider   methotrexate 2.5 MG Tab Take 20 mg by mouth every 7 days. 5/30/22   Historical Provider   ondansetron (ZOFRAN-ODT) 4 MG TbDL Take 1 tablet (4 mg total) by mouth every 6 (six) hours as needed (nausea). 10/19/22   Reyna Duval MD   oxyCODONE (ROXICODONE) 5 MG immediate release tablet Take 1 tablet (5 mg total) by mouth every 4 (four) hours as needed for Pain. 9/12/22   Jerrica Unger MD   pantoprazole (PROTONIX) 20 MG tablet Take 1 tablet (20 mg total) by mouth once daily. for 14 days 10/20/22 11/3/22  BAN Gilbert   promethazine (PHENERGAN) 12.5 MG Tab Take 1 tablet (12.5 mg total) by mouth every 6 (six) hours as needed for Nausea (nausea/vomiting). 10/19/22   BAN Gilbert   folic acid (FOLVITE) 1 MG tablet Take 1 mg by mouth once daily.  11/10/22  Historical Provider   metFORMIN (GLUCOPHAGE) 1000 MG tablet Take 1,000 mg by mouth 2 (two) times daily with meals.  11/10/22  Historical Provider     Anticoagulants/Antiplatelets: no anticoagulation    Allergies: Review of patient's allergies indicates:  No Known Allergies  Sedation History:  no adverse reactions    Labs:  Lab Results   Component Value Date    INR 1.1 11/01/2022       Lab  Results   Component Value Date    WBC 9.16 10/19/2022    HGB 12.3 10/19/2022    HCT 36.7 (L) 10/19/2022    MCV 98 10/19/2022     10/19/2022     Lab Results   Component Value Date     10/19/2022     10/19/2022    K 4.6 10/19/2022     10/19/2022    CO2 24 10/19/2022    BUN 13 10/19/2022    CREATININE 0.8 10/19/2022    CALCIUM 9.3 10/19/2022    ALT 24 10/19/2022    AST 36 10/19/2022    ALBUMIN 3.5 10/19/2022    BILITOT 0.6 10/19/2022       Review of Systems:   Hematological: no known coagulopathies  Respiratory: no shortness of breath  Cardiovascular: no chest pain  Gastrointestinal: no abdominal pain  Genito-Urinary: no dysuria  Musculoskeletal: negative  Neurological: no TIA or stroke symptoms         OBJECTIVE:     Vital Signs (Most Recent)  Temp: 98.2 °F (36.8 °C) (11/10/22 0732)  Pulse: 93 (11/10/22 0732)  Resp: 18 (11/10/22 0732)  BP: (!) 144/80 (11/10/22 0732)  SpO2: 96 % (11/10/22 0732)    Physical Exam:  ASA: 3  Mallampati: 2    General: no acute distress  Mental Status: alert and oriented to person, place and time  HEENT: normocephalic, atraumatic  Chest: unlabored breathing  Heart: regular heart rate  Abdomen: nondistended  Extremity: moves all extremities    ASSESSMENT/PLAN:     Sedation Plan: Up to moderate sedation.  Patient will undergo transjugular liver biopsy.    Pam Louise

## 2022-11-10 NOTE — PLAN OF CARE
Pt arrived to MPU room 4 for 2hr recovery, no acute distress noted. VSS. Dressing CDI. IV patent.  at bedside, pt. Resting comfortably.

## 2022-11-11 ENCOUNTER — PATIENT MESSAGE (OUTPATIENT)
Dept: ADMINISTRATIVE | Facility: HOSPITAL | Age: 57
End: 2022-11-11
Payer: MEDICAID

## 2022-11-11 ENCOUNTER — TELEPHONE (OUTPATIENT)
Dept: HEPATOLOGY | Facility: CLINIC | Age: 57
End: 2022-11-11

## 2022-11-11 ENCOUNTER — PATIENT OUTREACH (OUTPATIENT)
Dept: ADMINISTRATIVE | Facility: HOSPITAL | Age: 57
End: 2022-11-11
Payer: MEDICAID

## 2022-11-14 ENCOUNTER — PATIENT MESSAGE (OUTPATIENT)
Dept: HEPATOLOGY | Facility: CLINIC | Age: 57
End: 2022-11-14
Payer: MEDICAID

## 2022-11-16 ENCOUNTER — HOSPITAL ENCOUNTER (OUTPATIENT)
Facility: HOSPITAL | Age: 57
Discharge: HOME | End: 2022-11-16
Attending: INTERNAL MEDICINE | Admitting: INTERNAL MEDICINE
Payer: MEDICAID

## 2022-11-16 ENCOUNTER — ANESTHESIA (OUTPATIENT)
Dept: ENDOSCOPY | Facility: HOSPITAL | Age: 57
End: 2022-11-16
Payer: MEDICAID

## 2022-11-16 ENCOUNTER — ANESTHESIA EVENT (OUTPATIENT)
Dept: ENDOSCOPY | Facility: HOSPITAL | Age: 57
End: 2022-11-16
Payer: MEDICAID

## 2022-11-16 VITALS
OXYGEN SATURATION: 100 % | BODY MASS INDEX: 32.96 KG/M2 | HEART RATE: 84 BPM | TEMPERATURE: 97 F | RESPIRATION RATE: 16 BRPM | WEIGHT: 210 LBS | SYSTOLIC BLOOD PRESSURE: 157 MMHG | HEIGHT: 67 IN | DIASTOLIC BLOOD PRESSURE: 71 MMHG

## 2022-11-16 DIAGNOSIS — K74.60 CIRRHOSIS: ICD-10-CM

## 2022-11-16 LAB
COMMENT: NORMAL
FINAL PATHOLOGIC DIAGNOSIS: NORMAL
GROSS: NORMAL
Lab: NORMAL
MICROSCOPIC EXAM: NORMAL
POCT GLUCOSE: 66 MG/DL (ref 70–110)
POCT GLUCOSE: 83 MG/DL (ref 70–110)

## 2022-11-16 PROCEDURE — 88305 TISSUE EXAM BY PATHOLOGIST: CPT | Mod: 26,,, | Performed by: PATHOLOGY

## 2022-11-16 PROCEDURE — 37000008 HC ANESTHESIA 1ST 15 MINUTES: Performed by: INTERNAL MEDICINE

## 2022-11-16 PROCEDURE — 37000009 HC ANESTHESIA EA ADD 15 MINS: Performed by: INTERNAL MEDICINE

## 2022-11-16 PROCEDURE — 63600175 PHARM REV CODE 636 W HCPCS: Performed by: NURSE ANESTHETIST, CERTIFIED REGISTERED

## 2022-11-16 PROCEDURE — 45385 PR COLONOSCOPY,REMV LESN,SNARE: ICD-10-PCS | Mod: ,,, | Performed by: INTERNAL MEDICINE

## 2022-11-16 PROCEDURE — 00813 ANES UPR LWR GI NDSC PX: CPT | Performed by: INTERNAL MEDICINE

## 2022-11-16 PROCEDURE — 82962 GLUCOSE BLOOD TEST: CPT | Performed by: INTERNAL MEDICINE

## 2022-11-16 PROCEDURE — 88305 TISSUE EXAM BY PATHOLOGIST: ICD-10-PCS | Mod: 26,,, | Performed by: PATHOLOGY

## 2022-11-16 PROCEDURE — D9220A PRA ANESTHESIA: Mod: CRNA,,, | Performed by: NURSE ANESTHETIST, CERTIFIED REGISTERED

## 2022-11-16 PROCEDURE — 25000003 PHARM REV CODE 250: Performed by: INTERNAL MEDICINE

## 2022-11-16 PROCEDURE — D9220A PRA ANESTHESIA: ICD-10-PCS | Mod: CRNA,,, | Performed by: NURSE ANESTHETIST, CERTIFIED REGISTERED

## 2022-11-16 PROCEDURE — 45385 COLONOSCOPY W/LESION REMOVAL: CPT | Mod: ,,, | Performed by: INTERNAL MEDICINE

## 2022-11-16 PROCEDURE — 43235 EGD DIAGNOSTIC BRUSH WASH: CPT | Mod: 51,,, | Performed by: INTERNAL MEDICINE

## 2022-11-16 PROCEDURE — 43235 EGD DIAGNOSTIC BRUSH WASH: CPT | Performed by: INTERNAL MEDICINE

## 2022-11-16 PROCEDURE — 88305 TISSUE EXAM BY PATHOLOGIST: CPT | Mod: 59 | Performed by: PATHOLOGY

## 2022-11-16 PROCEDURE — 45385 COLONOSCOPY W/LESION REMOVAL: CPT | Performed by: INTERNAL MEDICINE

## 2022-11-16 PROCEDURE — 25000003 PHARM REV CODE 250: Performed by: NURSE ANESTHETIST, CERTIFIED REGISTERED

## 2022-11-16 PROCEDURE — D9220A PRA ANESTHESIA: Mod: ANES,,, | Performed by: ANESTHESIOLOGY

## 2022-11-16 PROCEDURE — 43235 PR EGD, FLEX, DIAGNOSTIC: ICD-10-PCS | Mod: 51,,, | Performed by: INTERNAL MEDICINE

## 2022-11-16 PROCEDURE — 27201089 HC SNARE, DISP (ANY): Performed by: INTERNAL MEDICINE

## 2022-11-16 PROCEDURE — D9220A PRA ANESTHESIA: ICD-10-PCS | Mod: ANES,,, | Performed by: ANESTHESIOLOGY

## 2022-11-16 RX ORDER — ONDANSETRON 2 MG/ML
4 INJECTION INTRAMUSCULAR; INTRAVENOUS DAILY PRN
Status: DISCONTINUED | OUTPATIENT
Start: 2022-11-16 | End: 2022-11-16 | Stop reason: HOSPADM

## 2022-11-16 RX ORDER — SODIUM CHLORIDE 9 MG/ML
INJECTION, SOLUTION INTRAVENOUS CONTINUOUS
Status: DISCONTINUED | OUTPATIENT
Start: 2022-11-16 | End: 2022-11-16 | Stop reason: HOSPADM

## 2022-11-16 RX ORDER — LIDOCAINE HYDROCHLORIDE 20 MG/ML
INJECTION INTRAVENOUS
Status: DISCONTINUED | OUTPATIENT
Start: 2022-11-16 | End: 2022-11-16

## 2022-11-16 RX ORDER — FENTANYL CITRATE 50 UG/ML
25 INJECTION, SOLUTION INTRAMUSCULAR; INTRAVENOUS EVERY 5 MIN PRN
Status: DISCONTINUED | OUTPATIENT
Start: 2022-11-16 | End: 2022-11-16 | Stop reason: HOSPADM

## 2022-11-16 RX ORDER — PROPOFOL 10 MG/ML
VIAL (ML) INTRAVENOUS CONTINUOUS PRN
Status: DISCONTINUED | OUTPATIENT
Start: 2022-11-16 | End: 2022-11-16

## 2022-11-16 RX ORDER — PHENYLEPHRINE HYDROCHLORIDE 10 MG/ML
INJECTION INTRAVENOUS
Status: DISCONTINUED | OUTPATIENT
Start: 2022-11-16 | End: 2022-11-16

## 2022-11-16 RX ORDER — PROPOFOL 10 MG/ML
VIAL (ML) INTRAVENOUS
Status: DISCONTINUED | OUTPATIENT
Start: 2022-11-16 | End: 2022-11-16

## 2022-11-16 RX ADMIN — LIDOCAINE HYDROCHLORIDE 100 MG: 20 INJECTION INTRAVENOUS at 12:11

## 2022-11-16 RX ADMIN — PROPOFOL 30 MG: 10 INJECTION, EMULSION INTRAVENOUS at 12:11

## 2022-11-16 RX ADMIN — PROPOFOL 100 MG: 10 INJECTION, EMULSION INTRAVENOUS at 12:11

## 2022-11-16 RX ADMIN — Medication 200 MCG/KG/MIN: at 12:11

## 2022-11-16 RX ADMIN — PHENYLEPHRINE HYDROCHLORIDE 150 MCG: 10 INJECTION INTRAVENOUS at 01:11

## 2022-11-16 RX ADMIN — PROPOFOL 30 MG: 10 INJECTION, EMULSION INTRAVENOUS at 01:11

## 2022-11-16 RX ADMIN — GLYCOPYRROLATE 0.2 MG: 0.2 INJECTION, SOLUTION INTRAMUSCULAR; INTRAVITREAL at 12:11

## 2022-11-16 RX ADMIN — SODIUM CHLORIDE: 0.9 INJECTION, SOLUTION INTRAVENOUS at 11:11

## 2022-11-16 NOTE — ANESTHESIA POSTPROCEDURE EVALUATION
Anesthesia Post Evaluation    Patient: Parris Guevara    Procedure(s) Performed: Procedure(s) (LRB):  ESOPHAGOGASTRODUODENOSCOPY (EGD) (N/A)  COLONOSCOPY (N/A)    Final Anesthesia Type: general      Patient location during evaluation: [facility]  Patient participation: Yes- Able to Participate  Level of consciousness: awake and alert  Post-procedure vital signs: reviewed and stable  Pain management: adequate  Airway patency: patent    PONV status at discharge: No PONV  Anesthetic complications: no      Cardiovascular status: hemodynamically stable  Respiratory status: unassisted, spontaneous ventilation and room air  Hydration status: euvolemic  Follow-up not needed.          Vitals Value Taken Time   /71 11/16/22 1416   Temp 36.3 °C (97.3 °F) 11/16/22 1347   Pulse 82 11/16/22 1419   Resp 26 11/16/22 1419   SpO2 100 % 11/16/22 1419   Vitals shown include unvalidated device data.      No case tracking events are documented in the log.      Pain/Charlie Score: Charlie Score: 10 (11/16/2022  2:20 PM)

## 2022-11-16 NOTE — TRANSFER OF CARE
"Anesthesia Transfer of Care Note    Patient: Parris Guevara    Procedure(s) Performed: Procedure(s) (LRB):  ESOPHAGOGASTRODUODENOSCOPY (EGD) (N/A)  COLONOSCOPY (N/A)    Patient location: [facility]    Anesthesia Type: general    Transport from OR: Transported from OR on room air with adequate spontaneous ventilation    Post pain: adequate analgesia    Post assessment: no apparent anesthetic complications    Post vital signs: stable    Level of consciousness: awake, alert and oriented    Nausea/Vomiting: no nausea/vomiting    Complications: none    Transfer of care protocol was followed      Last vitals:   Visit Vitals  BP (!) 122/59 (BP Location: Right arm, Patient Position: Lying)   Pulse 100   Temp 36.3 °C (97.3 °F) (Temporal)   Resp 16   Ht 5' 7" (1.702 m)   Wt 95.3 kg (210 lb)   SpO2 100%   Breastfeeding No   BMI 32.89 kg/m²     "

## 2022-11-16 NOTE — PROVATION PATIENT INSTRUCTIONS
Discharge Summary/Instructions after an Endoscopic Procedure  Patient Name: Parris Guevara  Patient MRN: [id number]  Patient YOB: 1965 Wednesday, November 16, 2022  Obed Duran MD  Dear patient,  As a result of recent federal legislation (The Federal Cures Act), you may   receive lab or pathology results from your procedure in your [facility]   account before your physician is able to contact you. Your physician or   their representative will relay the results to you with their   recommendations at their soonest availability.  Thank you,  RESTRICTIONS:  During your procedure today, you received medications for sedation.  These   medications may affect your judgment, balance and coordination.  Therefore,   for 24 hours, you have the following restrictions:   - DO NOT drive a car, operate machinery, make legal/financial decisions,   sign important papers or drink alcohol.    ACTIVITY:  Today: no heavy lifting, straining or running due to procedural   sedation/anesthesia.  The following day: return to full activity including work.  DIET:  Eat and drink normally unless instructed otherwise.     TREATMENT FOR COMMON SIDE EFFECTS:  - Mild abdominal pain, nausea, belching, bloating or excessive gas:  rest,   eat lightly and use a heating pad.  - Sore Throat: treat with throat lozenges and/or gargle with warm salt   water.  - Because air was used during the procedure, expelling large amounts of air   from your rectum or belching is normal.  - If a bowel prep was taken, you may not have a bowel movement for 1-3 days.    This is normal.  SYMPTOMS TO WATCH FOR AND REPORT TO YOUR PHYSICIAN:  1. Abdominal pain or bloating, other than gas cramps.  2. Chest pain.  3. Back pain.  4. Signs of infection such as: chills or fever occurring within 24 hours   after the procedure.  5. Rectal bleeding, which would show as bright red, maroon, or black stools.   (A tablespoon of blood from the rectum is not serious, especially if    hemorrhoids are present.)  6. Vomiting.  7. Weakness or dizziness.  GO DIRECTLY TO THE NEAREST EMERGENCY ROOM IF YOU HAVE ANY OF THE FOLLOWING:      Difficulty breathing              Chills and/or fever over 101 F   Persistent vomiting and/or vomiting blood   Severe abdominal pain   Severe chest pain   Black, tarry stools   Bleeding- more than one tablespoon   Any other symptom or condition that you feel may need urgent attention  Your doctor recommends these additional instructions:  If any biopsies were taken, your doctors clinic will contact you in 1 to 2   weeks with any results.  - Patient has a contact number available for emergencies.  The signs and   symptoms of potential delayed complications were discussed with the   patient.  Return to normal activities tomorrow.  Written discharge   instructions were provided to the patient.   - Discharge patient to home.   - Continue present medications.   - Repeat upper endoscopy in 1 year for surveillance or otherwise directed by   Hepatology.   - Return to referring physician.   - Perform a colonoscopy today.  For questions, problems or results please call your physician - Obed Duran MD at Work:  [phone].  [facility], EMERGENCY ROOM PHONE NUMBER: [phone]  IF A COMPLICATION OR EMERGENCY SITUATION ARISES AND YOU ARE UNABLE TO REACH   YOUR PHYSICIAN - GO DIRECTLY TO THE EMERGENCY ROOM.  Obed Duran MD  11/16/2022 1:02:10 PM  This report has been verified and signed electronically.  Dear patient,  As a result of recent federal legislation (The Federal Cures Act), you may   receive lab or pathology results from your procedure in your [facility]   account before your physician is able to contact you. Your physician or   their representative will relay the results to you with their   recommendations at their soonest availability.  Thank you,  PROVATION

## 2022-11-16 NOTE — H&P
Short Stay Endoscopy History and Physical    PCP - Reyna Duval MD  Referring Physician - Nelda Barger MD  [address] [place] [address]    Procedure - EGD and colonoscopy  ASA - per anesthesia  Mallampati - per anesthesia  History of Anesthesia problems - per anesthesia  Family history Anesthesia problems -  per anesthesia   Plan of anesthesia - per anesthesia    HPI:  This is a 57 y.o. female here for evaluation of: EGD and colonoscopy; EGD for variceal screening in setting of liver cirrhosis (never had bleeding or prior EGD before); colonoscopy for first screening exam, average risk    Reflux - no  Dysphagia - no  Abdominal pain - no  Diarrhea - no    ROS:  Constitutional: No fevers, chills, No weight loss  CV: No chest pain  Pulm: No cough, No shortness of breath  Ophtho: No vision changes  GI: see HPI  Derm: No rash    Medical History:  has a past medical history of Arthritis, Diabetes mellitus, Diabetes mellitus, type 2, Essential (primary) hypertension, Fatty liver, Hyperlipidemia, Hypertension, Hyperthyroidism, Hypothyroidism, Psoriasis, and Spleen enlarged.    Surgical History:  has a past surgical history that includes Thyroidectomy, partial; xi robotic hysterectomy, with salpingo-oophorectomy (Bilateral, 9/12/2022); and Robot-assisted laparoscopic lysis of adhesions using da Ema Xi (N/A, 9/12/2022).    Family History: family history includes Breast cancer in her cousin, cousin, and sister; Cancer in her brother, mother, and sister; Cirrhosis in her mother; Liver cancer in her mother..    Social History:  reports that she has never smoked. She has never used smokeless tobacco. She reports that she does not currently use alcohol. She reports that she does not currently use drugs.    Review of patient's allergies indicates:  No Known Allergies    Medications:   Medications Prior to Admission   Medication Sig Dispense Refill Last Dose    atorvastatin (LIPITOR) 20 MG tablet Take 20  mg by mouth once daily.   11/15/2022    levothyroxine (SYNTHROID) 125 MCG tablet Take 1 tablet (125 mcg total) by mouth once daily. 90 tablet 1 11/15/2022    metFORMIN (GLUCOPHAGE-XR) 500 MG ER 24hr tablet Take 1,000 mg by mouth.   11/15/2022    valsartan (DIOVAN) 320 MG tablet Take 1 tablet (320 mg total) by mouth once daily. 90 tablet 3 11/16/2022    atorvastatin (LIPITOR) 40 MG tablet Take 40 mg by mouth once daily.       celecoxib (CELEBREX) 200 MG capsule Take 200 mg by mouth once daily.       ergocalciferol (ERGOCALCIFEROL) 50,000 unit Cap Take 50,000 Units by mouth every 7 days.       ergocalciferol (ERGOCALCIFEROL) 50,000 unit Cap Take 50,000 Units by mouth every 7 days.       folic acid (FOLVITE) 1 MG tablet Take 1,000 mcg by mouth once daily.       metFORMIN (GLUCOPHAGE-XR) 500 MG ER 24hr tablet Take 1,000 mg by mouth every morning.       methotrexate 2.5 MG Tab Take by mouth every 7 days.       methotrexate 2.5 MG Tab Take 20 mg by mouth every 7 days.       ondansetron (ZOFRAN) 4 MG tablet Take by mouth 2 (two) times daily.       ondansetron (ZOFRAN-ODT) 4 MG TbDL Take 1 tablet (4 mg total) by mouth every 6 (six) hours as needed (nausea). 30 tablet 0     oxyCODONE (ROXICODONE) 5 MG immediate release tablet Take 1 tablet (5 mg total) by mouth every 4 (four) hours as needed for Pain. 12 tablet 0     pantoprazole (PROTONIX) 20 MG tablet Take 1 tablet (20 mg total) by mouth once daily. for 14 days 14 tablet 0     promethazine (PHENERGAN) 12.5 MG Tab Take 1 tablet (12.5 mg total) by mouth every 6 (six) hours as needed for Nausea (nausea/vomiting). 12 tablet 0     semaglutide (OZEMPIC) 1 mg/dose (2 mg/1.5 mL) PnIj Inject 1 mg into the skin every 7 days. 6 pen 3        Physical Exam:    Vital Signs:   Vitals:    11/16/22 1131   BP: (!) 161/86   Pulse: 92   Resp: 16   Temp: 98 °F (36.7 °C)       General Appearance: Well appearing in no acute distress    Labs:  Lab Results   Component Value Date    WBC 9.16  10/19/2022    HGB 12.3 10/19/2022    HCT 36.7 (L) 10/19/2022     10/19/2022    CHOL 134 08/10/2022    TRIG 86 08/10/2022    HDL 40 08/10/2022    ALT 24 10/19/2022    AST 36 10/19/2022     10/19/2022    K 4.6 10/19/2022     10/19/2022    CREATININE 0.8 10/19/2022    BUN 13 10/19/2022    CO2 24 10/19/2022    TSH 0.300 (L) 08/10/2022    INR 1.1 11/01/2022    HGBA1C 5.7 (H) 08/10/2022       I have explained the risks and benefits of this endoscopic procedure to the patient including but not limited to bleeding, inflammation, infection, perforation, missing a lesion and death.      Obed Duran MD

## 2022-11-16 NOTE — ANESTHESIA PREPROCEDURE EVALUATION
11/16/2022  Parris Guevara is a 57 y.o., female.  Pre-operative evaluation for Procedure(s) (LRB):  ESOPHAGOGASTRODUODENOSCOPY (EGD) (N/A)  COLONOSCOPY (N/A)    Parris Guevara is a 57 y.o. female       Patient Active Problem List   Diagnosis    Class 1 obesity due to excess calories with serious comorbidity and body mass index (BMI) of 33.0 to 33.9 in adult    Primary osteoarthritis of both knees    Plaque psoriasis    Subacromial bursitis of right shoulder joint    Type 2 diabetes mellitus without complication, without long-term current use of insulin    Postoperative hypothyroidism    Gallstones    Psoriatic arthritis    Pelvic mass    Splenomegaly    Hypertension associated with type 2 diabetes mellitus    Hyperlipidemia associated with type 2 diabetes mellitus    Nausea and vomiting    Splenomegaly    Elevated alkaline phosphatase level    Cyst of left ovary    S/P RA-TLH/BSO       Past Surgical History:   Procedure Laterality Date    ROBOT-ASSISTED LAPAROSCOPIC LYSIS OF ADHESIONS USING DA REED XI N/A 9/12/2022    Procedure: XI ROBOTIC LYSIS, ADHESIONS;  Surgeon: Silvestre Umaña MD;  Location: [facility];  Service: Oncology;  Laterality: N/A;    THYROIDECTOMY, PARTIAL      XI ROBOTIC HYSTERECTOMY, WITH SALPINGO-OOPHORECTOMY Bilateral 9/12/2022    Procedure: XI ROBOTIC HYSTERECTOMY,WITH SALPINGO-OOPHORECTOMY;  Surgeon: Silvestre Umaña MD;  Location: [facility];  Service: Oncology;  Laterality: Bilateral;       Social History     Socioeconomic History    Marital status:     Number of children: 3   Occupational History    Occupation: Retired. Former Nurse.   Tobacco Use    Smoking status: Never    Smokeless tobacco: Never   Substance and Sexual Activity    Alcohol use: Not Currently    Drug use: Not Currently    Sexual activity: Not Currently     Partners: Male   Social  History Narrative    ** Merged History Encounter **            No current facility-administered medications on file prior to encounter.     Current Outpatient Medications on File Prior to Encounter   Medication Sig Dispense Refill    atorvastatin (LIPITOR) 20 MG tablet Take 20 mg by mouth once daily.      atorvastatin (LIPITOR) 40 MG tablet Take 40 mg by mouth once daily.      celecoxib (CELEBREX) 200 MG capsule Take 200 mg by mouth once daily.      ergocalciferol (ERGOCALCIFEROL) 50,000 unit Cap Take 50,000 Units by mouth every 7 days.      ergocalciferol (ERGOCALCIFEROL) 50,000 unit Cap Take 50,000 Units by mouth every 7 days.      folic acid (FOLVITE) 1 MG tablet Take 1,000 mcg by mouth once daily.      levothyroxine (SYNTHROID) 125 MCG tablet Take 1 tablet (125 mcg total) by mouth once daily. 90 tablet 1    metFORMIN (GLUCOPHAGE-XR) 500 MG ER 24hr tablet Take 1,000 mg by mouth every morning.      metFORMIN (GLUCOPHAGE-XR) 500 MG ER 24hr tablet Take 1,000 mg by mouth.      methotrexate 2.5 MG Tab Take by mouth every 7 days.      methotrexate 2.5 MG Tab Take 20 mg by mouth every 7 days.      ondansetron (ZOFRAN) 4 MG tablet Take by mouth 2 (two) times daily.      oxyCODONE (ROXICODONE) 5 MG immediate release tablet Take 1 tablet (5 mg total) by mouth every 4 (four) hours as needed for Pain. 12 tablet 0       Review of patient's allergies indicates:  No Known Allergies      CBC: No results for input(s): WBC, RBC, HGB, HCT, PLT, MCV, MCH, MCHC in the last 72 hours.    CMP: No results for input(s): NA, K, CL, CO2, BUN, CREATININE, GLU, MG, PHOS, CALCIUM, ALBUMIN, PROT, ALKPHOS, ALT, AST, BILITOT in the last 72 hours.    INR  No results for input(s): PT, INR, PROTIME, APTT in the last 72 hours.      Diagnostic Studies:    EKG:   No results found for this or any previous visit.    TTE:  No results found for this or any previous visit.  No results found for: EF   No results found for this or any previous  visit.    KADEEM:  No results found for this or any previous visit.    Stress Test:  No results found for this or any previous visit.       LHC:  No results found for this or any previous visit.       PFT:  No results found for: FEV1, FVC, EGA0FAV, TLC, DLCO     ALLERGIES:   Review of patient's allergies indicates:  No Known Allergies  LDA:      Lines/Drains/Airways     None                Anesthesia Evaluation      Airway   Mallampati: II  TM distance: > 6 cm  Neck ROM: Normal ROM  Dental    (+) Intact    Pulmonary    Cardiovascular   (+) hypertension,     Rate: Normal    Neuro/Psych      GI/Hepatic/Renal    (+) liver disease,     Endo/Other    (+) diabetes mellitus,   Abdominal                         Pre-op Assessment    I have reviewed the Patient Summary Reports.     I have reviewed the Nursing Notes. I have reviewed the NPO Status.   I have reviewed the Medications.     Review of Systems  Anesthesia Hx:  No problems with previous Anesthesia  Denies Family Hx of Anesthesia complications.   Denies Personal Hx of Anesthesia complications.   Cardiovascular:   Hypertension    Pulmonary:  Pulmonary Normal    Renal/:  Renal/ Normal     Hepatic/GI:   Liver Disease,    Neurological:  Neurology Normal    Endocrine:   Diabetes        Physical Exam  General: Well nourished    Airway:  Mallampati: II   Mouth Opening: Normal  TM Distance: > 6 cm  Tongue: Normal  Neck ROM: Normal ROM    Dental:  Intact    Chest/Lungs:  Normal Respiratory Rate    Heart:  Rate: Normal        Anesthesia Plan  Type of Anesthesia, risks & benefits discussed:    Anesthesia Type: Gen Natural Airway, MAC  Intra-op Monitoring Plan: Standard ASA Monitors  Post Op Pain Control Plan: multimodal analgesia and IV/PO Opioids PRN  Induction:  IV  Airway Plan: Direct, Post-Induction  Informed Consent: Informed consent signed with the Patient and all parties understand the risks and agree with anesthesia plan.  All questions answered. Patient consented to  blood products? Yes  ASA Score: 3  Day of Surgery Review of History & Physical: H&P Update referred to the surgeon/provider.    Ready For Surgery From Anesthesia Perspective.     .

## 2022-11-16 NOTE — [employer]ATION PATIENT INSTRUCTIONS
Discharge Summary/Instructions after an Endoscopic Procedure  Patient Name: Parris Guevara  Patient MRN: [id number]  Patient YOB: 1965 Wednesday, November 16, 2022  Obed Duran MD  Dear patient,  As a result of recent federal legislation (The Federal Cures Act), you may   receive lab or pathology results from your procedure in your [facility]   account before your physician is able to contact you. Your physician or   their representative will relay the results to you with their   recommendations at their soonest availability.  Thank you,  RESTRICTIONS:  During your procedure today, you received medications for sedation.  These   medications may affect your judgment, balance and coordination.  Therefore,   for 24 hours, you have the following restrictions:   - DO NOT drive a car, operate machinery, make legal/financial decisions,   sign important papers or drink alcohol.    ACTIVITY:  Today: no heavy lifting, straining or running due to procedural   sedation/anesthesia.  The following day: return to full activity including work.  DIET:  Eat and drink normally unless instructed otherwise.     TREATMENT FOR COMMON SIDE EFFECTS:  - Mild abdominal pain, nausea, belching, bloating or excessive gas:  rest,   eat lightly and use a heating pad.  - Sore Throat: treat with throat lozenges and/or gargle with warm salt   water.  - Because air was used during the procedure, expelling large amounts of air   from your rectum or belching is normal.  - If a bowel prep was taken, you may not have a bowel movement for 1-3 days.    This is normal.  SYMPTOMS TO WATCH FOR AND REPORT TO YOUR PHYSICIAN:  1. Abdominal pain or bloating, other than gas cramps.  2. Chest pain.  3. Back pain.  4. Signs of infection such as: chills or fever occurring within 24 hours   after the procedure.  5. Rectal bleeding, which would show as bright red, maroon, or black stools.   (A tablespoon of blood from the rectum is not serious, especially if    hemorrhoids are present.)  6. Vomiting.  7. Weakness or dizziness.  GO DIRECTLY TO THE NEAREST EMERGENCY ROOM IF YOU HAVE ANY OF THE FOLLOWING:      Difficulty breathing              Chills and/or fever over 101 F   Persistent vomiting and/or vomiting blood   Severe abdominal pain   Severe chest pain   Black, tarry stools   Bleeding- more than one tablespoon   Any other symptom or condition that you feel may need urgent attention  Your doctor recommends these additional instructions:  If any biopsies were taken, your doctors clinic will contact you in 1 to 2   weeks with any results.  - Patient has a contact number available for emergencies.  The signs and   symptoms of potential delayed complications were discussed with the   patient.  Return to normal activities tomorrow.  Written discharge   instructions were provided to the patient.   - Discharge patient to home (ambulatory).   - Resume previous diet.   - Continue present medications.   - Await pathology results.   - Repeat colonoscopy in 5-7 years for surveillance based on pathology   results.   - Return to referring physician.  For questions, problems or results please call your physician - Obed Duran MD at Work:  [phone].  [facility], EMERGENCY ROOM PHONE NUMBER: [phone]  IF A COMPLICATION OR EMERGENCY SITUATION ARISES AND YOU ARE UNABLE TO REACH   YOUR PHYSICIAN - GO DIRECTLY TO THE EMERGENCY ROOM.  Obed Duran MD  11/16/2022 1:42:13 PM  This report has been verified and signed electronically.  Dear patient,  As a result of recent federal legislation (The Federal Cures Act), you may   receive lab or pathology results from your procedure in your [facility]   account before your physician is able to contact you. Your physician or   their representative will relay the results to you with their   recommendations at their soonest availability.  Thank you,  PROVATION

## 2022-11-16 NOTE — PLAN OF CARE
Patient tolerated procedure and anesthesia with no complaints of pain or nausea. Discharge material given and explained to patient and family. All verbalized understanding. Patient wheeled to ride via wheelchair in stable condition with no complaints.

## 2022-11-16 NOTE — ANESTHESIA RELEASE NOTE
"Anesthesia Release from PACU Note    Patient: Parris Guevara    Procedure(s) Performed: Procedure(s) (LRB):  ESOPHAGOGASTRODUODENOSCOPY (EGD) (N/A)  COLONOSCOPY (N/A)    Anesthesia type: general    Post pain: Adequate analgesia    Post assessment: no apparent anesthetic complications and tolerated procedure well    Last Vitals:   Visit Vitals  BP (!) 157/71   Pulse 84   Temp 36.3 °C (97.3 °F) (Temporal)   Resp 16   Ht 5' 7" (1.702 m)   Wt 95.3 kg (210 lb)   SpO2 100%   Breastfeeding No   BMI 32.89 kg/m²       Post vital signs: stable    Level of consciousness: awake and alert     Nausea/Vomiting: no nausea/no vomiting    Complications: none    Airway Patency: patent    Respiratory: unassisted, spontaneous ventilation, room air    Cardiovascular: stable and blood pressure at baseline    Hydration: euvolemic  "

## 2022-11-21 ENCOUNTER — TELEPHONE (OUTPATIENT)
Dept: GYNECOLOGIC ONCOLOGY | Facility: CLINIC | Age: 57
End: 2022-11-21
Payer: MEDICAID

## 2022-11-21 DIAGNOSIS — Z91.89 INCREASED RISK OF BREAST CANCER: Primary | ICD-10-CM

## 2022-11-21 NOTE — TELEPHONE ENCOUNTER
Informed patient no clinically significant mutations identified through genetic testing. We discussed her increased Tyrer Cuzick score 27.6% and Myriad Breast Cancer Riskscore 25.8%. She agreed to a referral to the high risk breast center and the referral was placed. Msg sent to Rolando regarding scheduling. We discussed the importance of maintaining an open line of communication with her established providers, for all future concerning symptoms. We also discussed the need to continue any screening measures and routine self/provider exams, despite negative results. Results mailed to address on file after address verified. The patient was informed of [employer] number on last page for additional questions regarding results, once received via mail. Pt expressed verbal understanding to all information provided. Message sent to  and Lucinda Renteria regarding patient results and referral.

## 2022-11-23 ENCOUNTER — LAB VISIT (OUTPATIENT)
Dept: LAB | Facility: HOSPITAL | Age: 57
End: 2022-11-23
Payer: MEDICAID

## 2022-11-23 ENCOUNTER — OFFICE VISIT (OUTPATIENT)
Dept: HEPATOLOGY | Facility: CLINIC | Age: 57
End: 2022-11-23
Payer: MEDICAID

## 2022-11-23 ENCOUNTER — PATIENT MESSAGE (OUTPATIENT)
Dept: HEPATOLOGY | Facility: CLINIC | Age: 57
End: 2022-11-23

## 2022-11-23 VITALS
TEMPERATURE: 97 F | WEIGHT: 209 LBS | HEIGHT: 67 IN | HEART RATE: 96 BPM | BODY MASS INDEX: 32.8 KG/M2 | SYSTOLIC BLOOD PRESSURE: 151 MMHG | RESPIRATION RATE: 18 BRPM | OXYGEN SATURATION: 100 % | DIASTOLIC BLOOD PRESSURE: 74 MMHG

## 2022-11-23 DIAGNOSIS — K74.60 CIRRHOSIS OF LIVER WITHOUT ASCITES, UNSPECIFIED HEPATIC CIRRHOSIS TYPE: ICD-10-CM

## 2022-11-23 DIAGNOSIS — E11.59 HYPERTENSION ASSOCIATED WITH TYPE 2 DIABETES MELLITUS: ICD-10-CM

## 2022-11-23 DIAGNOSIS — E78.5 HYPERLIPIDEMIA ASSOCIATED WITH TYPE 2 DIABETES MELLITUS: ICD-10-CM

## 2022-11-23 DIAGNOSIS — E11.69 HYPERLIPIDEMIA ASSOCIATED WITH TYPE 2 DIABETES MELLITUS: ICD-10-CM

## 2022-11-23 DIAGNOSIS — K75.81 LIVER CIRRHOSIS SECONDARY TO NASH: Primary | ICD-10-CM

## 2022-11-23 DIAGNOSIS — Z23 NEED FOR HEPATITIS A AND B VACCINATION: ICD-10-CM

## 2022-11-23 DIAGNOSIS — E11.9 TYPE 2 DIABETES MELLITUS WITHOUT COMPLICATION, WITHOUT LONG-TERM CURRENT USE OF INSULIN: ICD-10-CM

## 2022-11-23 DIAGNOSIS — K74.60 LIVER CIRRHOSIS SECONDARY TO NASH: Primary | ICD-10-CM

## 2022-11-23 DIAGNOSIS — K76.6 PORTAL HYPERTENSION: ICD-10-CM

## 2022-11-23 DIAGNOSIS — I15.2 HYPERTENSION ASSOCIATED WITH TYPE 2 DIABETES MELLITUS: ICD-10-CM

## 2022-11-23 PROBLEM — R16.1 SPLENOMEGALY: Status: RESOLVED | Noted: 2022-07-13 | Resolved: 2022-11-23

## 2022-11-23 PROBLEM — R16.1 SPLENOMEGALY: Status: RESOLVED | Noted: 2022-08-10 | Resolved: 2022-11-23

## 2022-11-23 LAB
A1AT SERPL-MCNC: 192 MG/DL (ref 100–190)
CERULOPLASMIN SERPL-MCNC: 27 MG/DL (ref 15–45)
FERRITIN SERPL-MCNC: 122 NG/ML (ref 20–300)
FINAL PATHOLOGIC DIAGNOSIS: NORMAL
GROSS: NORMAL
HAV IGG SER QL IA: NORMAL
HBV CORE AB SERPL QL IA: NORMAL
HBV SURFACE AB SER-ACNC: <3 MIU/ML
HBV SURFACE AB SER-ACNC: NORMAL M[IU]/ML
HBV SURFACE AG SERPL QL IA: NORMAL
HCV AB SERPL QL IA: NORMAL
IRON SERPL-MCNC: 66 UG/DL (ref 30–160)
Lab: NORMAL
SATURATED IRON: 19 % (ref 20–50)
TOTAL IRON BINDING CAPACITY: 342 UG/DL (ref 250–450)
TRANSFERRIN SERPL-MCNC: 231 MG/DL (ref 200–375)

## 2022-11-23 PROCEDURE — 99215 OFFICE O/P EST HI 40 MIN: CPT | Mod: S$PBB,,, | Performed by: NURSE PRACTITIONER

## 2022-11-23 PROCEDURE — 3008F BODY MASS INDEX DOCD: CPT | Mod: CPTII,,, | Performed by: NURSE PRACTITIONER

## 2022-11-23 PROCEDURE — 4010F PR ACE/ARB THEARPY RXD/TAKEN: ICD-10-PCS | Mod: CPTII,,, | Performed by: NURSE PRACTITIONER

## 2022-11-23 PROCEDURE — 3044F PR MOST RECENT HEMOGLOBIN A1C LEVEL <7.0%: ICD-10-PCS | Mod: CPTII,,, | Performed by: NURSE PRACTITIONER

## 2022-11-23 PROCEDURE — 1160F PR REVIEW ALL MEDS BY PRESCRIBER/CLIN PHARMACIST DOCUMENTED: ICD-10-PCS | Mod: CPTII,,, | Performed by: NURSE PRACTITIONER

## 2022-11-23 PROCEDURE — 86706 HEP B SURFACE ANTIBODY: CPT | Performed by: NURSE PRACTITIONER

## 2022-11-23 PROCEDURE — 3077F PR MOST RECENT SYSTOLIC BLOOD PRESSURE >= 140 MM HG: ICD-10-PCS | Mod: CPTII,,, | Performed by: NURSE PRACTITIONER

## 2022-11-23 PROCEDURE — 3044F HG A1C LEVEL LT 7.0%: CPT | Mod: CPTII,,, | Performed by: NURSE PRACTITIONER

## 2022-11-23 PROCEDURE — 99999 PR PBB SHADOW E&M-EST. PATIENT-LVL IV: CPT | Mod: PBBFAC,,, | Performed by: NURSE PRACTITIONER

## 2022-11-23 PROCEDURE — 99214 OFFICE O/P EST MOD 30 MIN: CPT | Mod: PBBFAC | Performed by: NURSE PRACTITIONER

## 2022-11-23 PROCEDURE — 84466 ASSAY OF TRANSFERRIN: CPT | Performed by: NURSE PRACTITIONER

## 2022-11-23 PROCEDURE — 1160F RVW MEDS BY RX/DR IN RCRD: CPT | Mod: CPTII,,, | Performed by: NURSE PRACTITIONER

## 2022-11-23 PROCEDURE — 1159F MED LIST DOCD IN RCRD: CPT | Mod: CPTII,,, | Performed by: NURSE PRACTITIONER

## 2022-11-23 PROCEDURE — 3061F NEG MICROALBUMINURIA REV: CPT | Mod: CPTII,,, | Performed by: NURSE PRACTITIONER

## 2022-11-23 PROCEDURE — 3008F PR BODY MASS INDEX (BMI) DOCUMENTED: ICD-10-PCS | Mod: CPTII,,, | Performed by: NURSE PRACTITIONER

## 2022-11-23 PROCEDURE — 3077F SYST BP >= 140 MM HG: CPT | Mod: CPTII,,, | Performed by: NURSE PRACTITIONER

## 2022-11-23 PROCEDURE — 86790 VIRUS ANTIBODY NOS: CPT | Performed by: NURSE PRACTITIONER

## 2022-11-23 PROCEDURE — 4010F ACE/ARB THERAPY RXD/TAKEN: CPT | Mod: CPTII,,, | Performed by: NURSE PRACTITIONER

## 2022-11-23 PROCEDURE — 82728 ASSAY OF FERRITIN: CPT | Performed by: NURSE PRACTITIONER

## 2022-11-23 PROCEDURE — 82103 ALPHA-1-ANTITRYPSIN TOTAL: CPT | Performed by: NURSE PRACTITIONER

## 2022-11-23 PROCEDURE — 86704 HEP B CORE ANTIBODY TOTAL: CPT | Performed by: NURSE PRACTITIONER

## 2022-11-23 PROCEDURE — 86803 HEPATITIS C AB TEST: CPT | Performed by: NURSE PRACTITIONER

## 2022-11-23 PROCEDURE — 99215 PR OFFICE/OUTPT VISIT, EST, LEVL V, 40-54 MIN: ICD-10-PCS | Mod: S$PBB,,, | Performed by: NURSE PRACTITIONER

## 2022-11-23 PROCEDURE — 99999 PR PBB SHADOW E&M-EST. PATIENT-LVL IV: ICD-10-PCS | Mod: PBBFAC,,, | Performed by: NURSE PRACTITIONER

## 2022-11-23 PROCEDURE — 3078F PR MOST RECENT DIASTOLIC BLOOD PRESSURE < 80 MM HG: ICD-10-PCS | Mod: CPTII,,, | Performed by: NURSE PRACTITIONER

## 2022-11-23 PROCEDURE — 3078F DIAST BP <80 MM HG: CPT | Mod: CPTII,,, | Performed by: NURSE PRACTITIONER

## 2022-11-23 PROCEDURE — 87340 HEPATITIS B SURFACE AG IA: CPT | Performed by: NURSE PRACTITIONER

## 2022-11-23 PROCEDURE — 3066F NEPHROPATHY DOC TX: CPT | Mod: CPTII,,, | Performed by: NURSE PRACTITIONER

## 2022-11-23 PROCEDURE — 3066F PR DOCUMENTATION OF TREATMENT FOR NEPHROPATHY: ICD-10-PCS | Mod: CPTII,,, | Performed by: NURSE PRACTITIONER

## 2022-11-23 PROCEDURE — 36415 COLL VENOUS BLD VENIPUNCTURE: CPT | Performed by: NURSE PRACTITIONER

## 2022-11-23 PROCEDURE — 1159F PR MEDICATION LIST DOCUMENTED IN MEDICAL RECORD: ICD-10-PCS | Mod: CPTII,,, | Performed by: NURSE PRACTITIONER

## 2022-11-23 PROCEDURE — 3061F PR NEG MICROALBUMINURIA RESULT DOCUMENTED/REVIEW: ICD-10-PCS | Mod: CPTII,,, | Performed by: NURSE PRACTITIONER

## 2022-11-23 PROCEDURE — 82390 ASSAY OF CERULOPLASMIN: CPT | Performed by: NURSE PRACTITIONER

## 2022-11-23 NOTE — PATIENT INSTRUCTIONS
This is a web site that you may find helpful about cirrhosis : [email or web address]    Because you have cirrhosis, it is important to attend clinic visits every 6 months with an Ultrasound and blood tests every 6 months to screen for liver cancer (you are at risk of developing liver cancer due to scar tissue in the liver)    Signs and symptoms of worsening liver disease include jaundice, fluid in the belly (ascites), and confusion/disorientation/slowed thought processes due to hepatic encephalopathy (toxins building up because of liver problems).   You should seek medical attention if any of these things occur.    Also, possible bleeding from esophageal varices (blood vessels in the stomach and foodpipe can burst and cause fatal bleeding).  Therefore, if you have symptoms of vomiting blood, blood in your stool, dark or black stools or vomiting coffee ground vomit, YOU SHOULD GO TO THE EMERGENCY ROOM IMMEDIATELY.     Cirrhosis can increase the risk of liver cancer, liver failure, and death. However, we will watch your liver function score (MELD score) closely with each clinic visit. A normal MELD score is 6, highest is 40. Your last one was an 7. We will check this with every clinic visit. A MELD 15 or higher is when we start to consider transplant because MELD 15 or higher indicates that the liver is not functioning as well     Cirrhosis Counseling  - NO alcohol use (includes beer, wine, and/or liquor)  - avoid non-steroidal anti-inflammatory drugs (NSAIDs) such as ibuprofen, Motrin, naprosyn, Alleve due to the risk of kidney damage  - can take acetaminophen (Tylenol), no more than 2000 mg per day  - low sodium (salt) 2 gram per day diet  - high protein diet: 110 grams per day to prevent muscle mass loss. Drink at least 1 protein shake daily (Premier Protein is best option because it is very high protein and low sugar). Ok to use this as nighttime snack to fit it in   - resistance exercises for muscle  strength  - avoid raw seafoods due to the risk of fatal Vibrio vulnificus infection  - ultrasound of the liver every 6 months for liver cancer screening (you are at risk of developing liver cancer due to scar tissue in the liver)  - Upper endoscopy every 1-2 years to screen for varices in the stomach and foodpipe which can burst and cause fatal bleeding

## 2022-11-23 NOTE — PROGRESS NOTES
[facility] Established Patient Visit    Reason for Visit:  cirrhosis likely     PCP: Reyna Duval    HPI:  This is a 57 y.o. female with PMH noted below, here for follow up of Well-compensated cirrhosis likely due to fatty liver versus MTX   Was on MTX for years, stopped ~9/2022    RA and PSA followed by rheumatology     Diagnosis of cirrhosis based on  liver biopsy      No Previous serologic w/u - will obtain limited w/u but liver biopsy unrevealing     Prior serologic workup:   No results found for: SMOOTHMUSCAB, AMAIFA, IGGSERUM, ANASCREEN, FERRITIN, FESATURATED, PETH, [id number], CERULOPLSM, HEPBSAG, HEPCAB, HEPAIGM    Risk factors for NAFLD include obesity, HTN, HLD, DM    Interval HPI: Presents today alone. No s/s of hepatic decompensation: no ascites, HE or h/o variceal bleeding    Abd CT done 10/2022 showed nodular contour liver, no lesions, + splenomegaly    Lab Results   Component Value Date    ALT 24 10/19/2022    AST 36 10/19/2022    ALKPHOS 161 (H) 10/19/2022    BILITOT 0.6 10/19/2022    ALBUMIN 3.5 10/19/2022    INR 1.1 11/01/2022     10/19/2022     MELD-Na score: 7 at 10/6/2022 10:33 AM  MELD score: 7 at 10/6/2022 10:33 AM  Calculated from:  Serum Creatinine: 0.7 mg/dL (Using min of 1 mg/dL) at 10/6/2022 10:33 AM  Serum Sodium: 139 mmol/L (Using max of 137 mmol/L) at 10/6/2022 10:33 AM  Total Bilirubin: 0.5 mg/dL (Using min of 1 mg/dL) at 10/6/2022 10:33 AM  INR(ratio): 1.1 at 10/6/2022 10:33 AM  Age: 57 years  MELD has been low, indicating no benefit to liver transplant currently    Cirrhosis Health Maintenance:   -- Last EGD 11/2022 with small varices, next due 11/2023  -- Due for next colonoscopy : 2027  -- HCC screening   CT  no lesions, next due 4/2023   AFP  WNL, next due 4/2023  Lab Results   Component Value Date    AFP 8.4 10/06/2022       -- Immunity to Hep A and B - will check today     + family history of liver disease: mother with CARTER cirrhosis. Denies current  alcohol consumption  or history of significant alcohol consumption in past   Social History     Substance and Sexual Activity   Alcohol Use Not Currently         PMHX:  has a past medical history of Arthritis, Diabetes mellitus, Diabetes mellitus, type 2, Essential (primary) hypertension, Fatty liver, Hyperlipidemia, Hypertension, Hyperthyroidism, Hypothyroidism, Psoriasis, and Spleen enlarged.    PSHX:  has a past surgical history that includes Thyroidectomy, partial; xi robotic hysterectomy, with salpingo-oophorectomy (Bilateral, 9/12/2022); Robot-assisted laparoscopic lysis of adhesions using da Ema Xi (N/A, 9/12/2022); Esophagogastroduodenoscopy (N/A, 11/16/2022); and Colonoscopy (N/A, 11/16/2022).    The patient's social and family histories were reviewed by me and updated in the appropriate section of the electronic medical record.    Review of patient's allergies indicates:  No Known Allergies    Current Outpatient Medications on File Prior to Visit   Medication Sig Dispense Refill    atorvastatin (LIPITOR) 40 MG tablet Take 40 mg by mouth once daily.      ergocalciferol (ERGOCALCIFEROL) 50,000 unit Cap Take 50,000 Units by mouth every 7 days.      folic acid (FOLVITE) 1 MG tablet Take 1,000 mcg by mouth once daily.      levothyroxine (SYNTHROID) 125 MCG tablet Take 1 tablet (125 mcg total) by mouth once daily. 90 tablet 1    metFORMIN (GLUCOPHAGE-XR) 500 MG ER 24hr tablet Take 1,000 mg by mouth every morning.      methotrexate 2.5 MG Tab Take 20 mg by mouth every 7 days.      ondansetron (ZOFRAN) 4 MG tablet Take by mouth 2 (two) times daily.      ondansetron (ZOFRAN-ODT) 4 MG TbDL Take 1 tablet (4 mg total) by mouth every 6 (six) hours as needed (nausea). 30 tablet 0    promethazine (PHENERGAN) 12.5 MG Tab Take 1 tablet (12.5 mg total) by mouth every 6 (six) hours as needed for Nausea (nausea/vomiting). 12 tablet 0    semaglutide (OZEMPIC) 1 mg/dose (2 mg/1.5 mL) PnIj Inject 1 mg into the skin every 7  "days. 6 pen 3    valsartan (DIOVAN) 320 MG tablet Take 1 tablet (320 mg total) by mouth once daily. 90 tablet 3    atorvastatin (LIPITOR) 20 MG tablet Take 20 mg by mouth once daily.      celecoxib (CELEBREX) 200 MG capsule Take 200 mg by mouth once daily.      ergocalciferol (ERGOCALCIFEROL) 50,000 unit Cap Take 50,000 Units by mouth every 7 days.      metFORMIN (GLUCOPHAGE-XR) 500 MG ER 24hr tablet Take 1,000 mg by mouth.      methotrexate 2.5 MG Tab Take by mouth every 7 days.      oxyCODONE (ROXICODONE) 5 MG immediate release tablet Take 1 tablet (5 mg total) by mouth every 4 (four) hours as needed for Pain. 12 tablet 0    pantoprazole (PROTONIX) 20 MG tablet Take 1 tablet (20 mg total) by mouth once daily. for 14 days 14 tablet 0     No current facility-administered medications on file prior to visit.       SOCIAL HISTORY:   Social History     Substance and Sexual Activity   Alcohol Use Not Currently       Social History     Substance and Sexual Activity   Drug Use Not Currently       ROS:   GENERAL: Denies fatigue  CARDIOVASCULAR: Denies edema  GI: Denies abdominal pain  SKIN: Denies rash, itching   NEURO: Denies confusion, memory loss, or mood changes    Objective Findings:    PHYSICAL EXAM:   Friendly Black or  female, in no acute distress; alert and oriented to person, place and time  VITALS: BP (!) 151/74 (BP Location: Right arm, Patient Position: Sitting, BP Method: Medium (Automatic))   Pulse 96   Temp 97.1 °F (36.2 °C) (Oral)   Resp 18   Ht 5' 7" (1.702 m)   Wt 94.8 kg (208 lb 15.9 oz)   SpO2 100%   BMI 32.73 kg/m²   EYES: Sclerae anicteric  GI: Soft, non-tender, non-distended. No ascites.  EXTREMITIES:  No edema.  SKIN: Warm and dry. No jaundice. No telangectasias noted. No palmar erythema.  NEURO:  No asterixis.  PSYCH:  Thought and speech pattern appropriate. Behavior normal        EDUCATION:  See instructions discussed with patient in Instructions section of the After Visit " Summary       ASSESSMENT & PLAN:  57 y.o. Black or  female with:  1.  Cirrhosis, likely due to CARTER versus MTX (?), well compensated  --- cirrhosis diagnosis based on biopsy  -- MELD-Na score: 7 at 10/6/2022 10:33 AM  MELD score: 7 at 10/6/2022 10:33 AM  Calculated from:  Serum Creatinine: 0.7 mg/dL (Using min of 1 mg/dL) at 10/6/2022 10:33 AM  Serum Sodium: 139 mmol/L (Using max of 137 mmol/L) at 10/6/2022 10:33 AM  Total Bilirubin: 0.5 mg/dL (Using min of 1 mg/dL) at 10/6/2022 10:33 AM  INR(ratio): 1.1 at 10/6/2022 10:33 AM  Age: 57 years  -- HCC screening: AFP and abd. U/S.. U/S showed no lesons, AFP AFP - both next due 5/2023  -- Immunity to Hep A and B - . Will check immunity markers for HBV/HAV and arrange for vaccination if needed   --- Serological workup: will obtain   -- Cirrhosis counseling as noted above and discussed with patient    2.  Fatty liver  -- risk factors for fatty liver: obesity, HTN, HLD, DM  Recommend:  1. Weight loss goal of 20 lbs  2. Low carb/sugar, high fiber and protein diet  3. Exercise, 5 days per week, 30 minutes per day, as tolerated  4. Recommend good cholesterol, blood pressure, blood sugar levels     3. Portal hypertension   -- EGD 11/2022 noted small varices, repeat 11/2023  -- No Ascites or ankle edema   -- + Splenomegaly        Follow up in about 6 months (around 5/23/2023). with US and labs a few days before  Orders Placed This Encounter   Procedures    US Abdomen Limited    Alpha-1-Antitrypsin    Ferritin    Iron and TIBC    Ceruloplasmin    Hepatitis A antibody, IgG    Hepatitis B Core Antibody, Total    Hepatitis B Surface Ab, Qualitative    Hepatitis B Surface Antigen    Hepatitis C Antibody    AFP Tumor Marker    CBC Without Differential    Comprehensive Metabolic Panel    Protime-INR        Thank you for allowing me to participate in the care of ARABELLA Bean    I spent a total of 40 minutes on the day of the visit.This  includes face to face time and non-face to face time preparing to see the patient (eg, review of tests), obtaining and/or reviewing separately obtained history, documenting clinical information in the electronic or other health record, independently interpreting results and communicating results to the patient/family/caregiver, and coordinating care.       CC'ed note to:   Reyna Duval MD

## 2022-11-25 ENCOUNTER — PATIENT MESSAGE (OUTPATIENT)
Dept: HEPATOLOGY | Facility: CLINIC | Age: 57
End: 2022-11-25
Payer: MEDICAID

## 2022-12-01 ENCOUNTER — TELEPHONE (OUTPATIENT)
Dept: GYNECOLOGIC ONCOLOGY | Facility: CLINIC | Age: 57
End: 2022-12-01
Payer: MEDICAID

## 2022-12-01 NOTE — TELEPHONE ENCOUNTER
----- Message from Aubrey Spann MA sent at 12/1/2022  1:41 PM CST -----  Hi, please call Abril with [facility] department [phone]    fax  [phone] she is calling about a procedure  that the patient had on 9-12-22. Thanks

## 2022-12-07 ENCOUNTER — PATIENT MESSAGE (OUTPATIENT)
Dept: PHARMACY | Facility: CLINIC | Age: 57
End: 2022-12-07
Payer: MEDICAID

## 2022-12-08 ENCOUNTER — IMMUNIZATION (OUTPATIENT)
Dept: PHARMACY | Facility: CLINIC | Age: 57
End: 2022-12-08
Payer: MEDICAID

## 2022-12-13 ENCOUNTER — TELEPHONE (OUTPATIENT)
Dept: HEPATOLOGY | Facility: CLINIC | Age: 57
End: 2022-12-13
Payer: MEDICAID

## 2022-12-14 ENCOUNTER — LAB VISIT (OUTPATIENT)
Dept: LAB | Facility: HOSPITAL | Age: 57
End: 2022-12-14
Attending: HOSPITALIST
Payer: MEDICAID

## 2022-12-14 ENCOUNTER — OFFICE VISIT (OUTPATIENT)
Dept: HEMATOLOGY/ONCOLOGY | Facility: CLINIC | Age: 57
End: 2022-12-14
Payer: MEDICAID

## 2022-12-14 VITALS
BODY MASS INDEX: 32.47 KG/M2 | HEART RATE: 91 BPM | TEMPERATURE: 98 F | HEIGHT: 67 IN | WEIGHT: 206.88 LBS | OXYGEN SATURATION: 99 % | RESPIRATION RATE: 18 BRPM | SYSTOLIC BLOOD PRESSURE: 145 MMHG | DIASTOLIC BLOOD PRESSURE: 81 MMHG

## 2022-12-14 DIAGNOSIS — I15.2 HYPERTENSION ASSOCIATED WITH TYPE 2 DIABETES MELLITUS: ICD-10-CM

## 2022-12-14 DIAGNOSIS — E11.59 HYPERTENSION ASSOCIATED WITH TYPE 2 DIABETES MELLITUS: ICD-10-CM

## 2022-12-14 DIAGNOSIS — E89.0 POSTOPERATIVE HYPOTHYROIDISM: ICD-10-CM

## 2022-12-14 DIAGNOSIS — E11.9 TYPE 2 DIABETES MELLITUS WITHOUT COMPLICATION, WITHOUT LONG-TERM CURRENT USE OF INSULIN: ICD-10-CM

## 2022-12-14 DIAGNOSIS — Z91.89 INCREASED RISK OF BREAST CANCER: ICD-10-CM

## 2022-12-14 LAB
ALBUMIN SERPL BCP-MCNC: 3.3 G/DL (ref 3.5–5.2)
ALP SERPL-CCNC: 165 U/L (ref 55–135)
ALT SERPL W/O P-5'-P-CCNC: 19 U/L (ref 10–44)
ANION GAP SERPL CALC-SCNC: 7 MMOL/L (ref 8–16)
AST SERPL-CCNC: 36 U/L (ref 10–40)
BASOPHILS # BLD AUTO: 0.05 K/UL (ref 0–0.2)
BASOPHILS NFR BLD: 0.8 % (ref 0–1.9)
BILIRUB SERPL-MCNC: 0.7 MG/DL (ref 0.1–1)
BUN SERPL-MCNC: 9 MG/DL (ref 6–20)
CALCIUM SERPL-MCNC: 9 MG/DL (ref 8.7–10.5)
CHLORIDE SERPL-SCNC: 108 MMOL/L (ref 95–110)
CO2 SERPL-SCNC: 24 MMOL/L (ref 23–29)
CREAT SERPL-MCNC: 0.8 MG/DL (ref 0.5–1.4)
DIFFERENTIAL METHOD: ABNORMAL
EOSINOPHIL # BLD AUTO: 0.7 K/UL (ref 0–0.5)
EOSINOPHIL NFR BLD: 10.5 % (ref 0–8)
ERYTHROCYTE [DISTWIDTH] IN BLOOD BY AUTOMATED COUNT: 12.8 % (ref 11.5–14.5)
EST. GFR  (NO RACE VARIABLE): >60 ML/MIN/1.73 M^2
ESTIMATED AVG GLUCOSE: 97 MG/DL (ref 68–131)
GLUCOSE SERPL-MCNC: 94 MG/DL (ref 70–110)
HBA1C MFR BLD: 5 % (ref 4–5.6)
HCT VFR BLD AUTO: 36.3 % (ref 37–48.5)
HGB BLD-MCNC: 11.7 G/DL (ref 12–16)
IMM GRANULOCYTES # BLD AUTO: 0.01 K/UL (ref 0–0.04)
IMM GRANULOCYTES NFR BLD AUTO: 0.2 % (ref 0–0.5)
LYMPHOCYTES # BLD AUTO: 1.5 K/UL (ref 1–4.8)
LYMPHOCYTES NFR BLD: 24.4 % (ref 18–48)
MCH RBC QN AUTO: 31.3 PG (ref 27–31)
MCHC RBC AUTO-ENTMCNC: 32.2 G/DL (ref 32–36)
MCV RBC AUTO: 97 FL (ref 82–98)
MONOCYTES # BLD AUTO: 0.5 K/UL (ref 0.3–1)
MONOCYTES NFR BLD: 8.3 % (ref 4–15)
NEUTROPHILS # BLD AUTO: 3.5 K/UL (ref 1.8–7.7)
NEUTROPHILS NFR BLD: 55.8 % (ref 38–73)
NRBC BLD-RTO: 0 /100 WBC
PLATELET # BLD AUTO: 154 K/UL (ref 150–450)
PMV BLD AUTO: 12.4 FL (ref 9.2–12.9)
POTASSIUM SERPL-SCNC: 4.2 MMOL/L (ref 3.5–5.1)
PROT SERPL-MCNC: 7.8 G/DL (ref 6–8.4)
RBC # BLD AUTO: 3.74 M/UL (ref 4–5.4)
SODIUM SERPL-SCNC: 139 MMOL/L (ref 136–145)
TSH SERPL DL<=0.005 MIU/L-ACNC: 0.6 UIU/ML (ref 0.4–4)
WBC # BLD AUTO: 6.27 K/UL (ref 3.9–12.7)

## 2022-12-14 PROCEDURE — 1159F PR MEDICATION LIST DOCUMENTED IN MEDICAL RECORD: ICD-10-PCS | Mod: CPTII,,, | Performed by: STUDENT IN AN ORGANIZED HEALTH CARE EDUCATION/TRAINING PROGRAM

## 2022-12-14 PROCEDURE — 3044F PR MOST RECENT HEMOGLOBIN A1C LEVEL <7.0%: ICD-10-PCS | Mod: CPTII,,, | Performed by: STUDENT IN AN ORGANIZED HEALTH CARE EDUCATION/TRAINING PROGRAM

## 2022-12-14 PROCEDURE — 36415 COLL VENOUS BLD VENIPUNCTURE: CPT | Mod: PO | Performed by: HOSPITALIST

## 2022-12-14 PROCEDURE — 3008F BODY MASS INDEX DOCD: CPT | Mod: CPTII,,, | Performed by: STUDENT IN AN ORGANIZED HEALTH CARE EDUCATION/TRAINING PROGRAM

## 2022-12-14 PROCEDURE — 3061F PR NEG MICROALBUMINURIA RESULT DOCUMENTED/REVIEW: ICD-10-PCS | Mod: CPTII,,, | Performed by: STUDENT IN AN ORGANIZED HEALTH CARE EDUCATION/TRAINING PROGRAM

## 2022-12-14 PROCEDURE — 3044F HG A1C LEVEL LT 7.0%: CPT | Mod: CPTII,,, | Performed by: STUDENT IN AN ORGANIZED HEALTH CARE EDUCATION/TRAINING PROGRAM

## 2022-12-14 PROCEDURE — 4010F PR ACE/ARB THEARPY RXD/TAKEN: ICD-10-PCS | Mod: CPTII,,, | Performed by: STUDENT IN AN ORGANIZED HEALTH CARE EDUCATION/TRAINING PROGRAM

## 2022-12-14 PROCEDURE — 99999 PR PBB SHADOW E&M-EST. PATIENT-LVL IV: CPT | Mod: PBBFAC,,, | Performed by: STUDENT IN AN ORGANIZED HEALTH CARE EDUCATION/TRAINING PROGRAM

## 2022-12-14 PROCEDURE — 4010F ACE/ARB THERAPY RXD/TAKEN: CPT | Mod: CPTII,,, | Performed by: STUDENT IN AN ORGANIZED HEALTH CARE EDUCATION/TRAINING PROGRAM

## 2022-12-14 PROCEDURE — 83036 HEMOGLOBIN GLYCOSYLATED A1C: CPT | Performed by: HOSPITALIST

## 2022-12-14 PROCEDURE — 99205 OFFICE O/P NEW HI 60 MIN: CPT | Mod: S$PBB,,, | Performed by: STUDENT IN AN ORGANIZED HEALTH CARE EDUCATION/TRAINING PROGRAM

## 2022-12-14 PROCEDURE — 1159F MED LIST DOCD IN RCRD: CPT | Mod: CPTII,,, | Performed by: STUDENT IN AN ORGANIZED HEALTH CARE EDUCATION/TRAINING PROGRAM

## 2022-12-14 PROCEDURE — 99205 PR OFFICE/OUTPT VISIT, NEW, LEVL V, 60-74 MIN: ICD-10-PCS | Mod: S$PBB,,, | Performed by: STUDENT IN AN ORGANIZED HEALTH CARE EDUCATION/TRAINING PROGRAM

## 2022-12-14 PROCEDURE — 85025 COMPLETE CBC W/AUTO DIFF WBC: CPT | Performed by: HOSPITALIST

## 2022-12-14 PROCEDURE — 3079F DIAST BP 80-89 MM HG: CPT | Mod: CPTII,,, | Performed by: STUDENT IN AN ORGANIZED HEALTH CARE EDUCATION/TRAINING PROGRAM

## 2022-12-14 PROCEDURE — 84443 ASSAY THYROID STIM HORMONE: CPT | Performed by: HOSPITALIST

## 2022-12-14 PROCEDURE — 3008F PR BODY MASS INDEX (BMI) DOCUMENTED: ICD-10-PCS | Mod: CPTII,,, | Performed by: STUDENT IN AN ORGANIZED HEALTH CARE EDUCATION/TRAINING PROGRAM

## 2022-12-14 PROCEDURE — 3066F NEPHROPATHY DOC TX: CPT | Mod: CPTII,,, | Performed by: STUDENT IN AN ORGANIZED HEALTH CARE EDUCATION/TRAINING PROGRAM

## 2022-12-14 PROCEDURE — 80053 COMPREHEN METABOLIC PANEL: CPT | Performed by: HOSPITALIST

## 2022-12-14 PROCEDURE — 3077F SYST BP >= 140 MM HG: CPT | Mod: CPTII,,, | Performed by: STUDENT IN AN ORGANIZED HEALTH CARE EDUCATION/TRAINING PROGRAM

## 2022-12-14 PROCEDURE — 3066F PR DOCUMENTATION OF TREATMENT FOR NEPHROPATHY: ICD-10-PCS | Mod: CPTII,,, | Performed by: STUDENT IN AN ORGANIZED HEALTH CARE EDUCATION/TRAINING PROGRAM

## 2022-12-14 PROCEDURE — 3061F NEG MICROALBUMINURIA REV: CPT | Mod: CPTII,,, | Performed by: STUDENT IN AN ORGANIZED HEALTH CARE EDUCATION/TRAINING PROGRAM

## 2022-12-14 PROCEDURE — 99999 PR PBB SHADOW E&M-EST. PATIENT-LVL IV: ICD-10-PCS | Mod: PBBFAC,,, | Performed by: STUDENT IN AN ORGANIZED HEALTH CARE EDUCATION/TRAINING PROGRAM

## 2022-12-14 PROCEDURE — 99214 OFFICE O/P EST MOD 30 MIN: CPT | Mod: PBBFAC | Performed by: STUDENT IN AN ORGANIZED HEALTH CARE EDUCATION/TRAINING PROGRAM

## 2022-12-14 PROCEDURE — 3077F PR MOST RECENT SYSTOLIC BLOOD PRESSURE >= 140 MM HG: ICD-10-PCS | Mod: CPTII,,, | Performed by: STUDENT IN AN ORGANIZED HEALTH CARE EDUCATION/TRAINING PROGRAM

## 2022-12-14 PROCEDURE — 3079F PR MOST RECENT DIASTOLIC BLOOD PRESSURE 80-89 MM HG: ICD-10-PCS | Mod: CPTII,,, | Performed by: STUDENT IN AN ORGANIZED HEALTH CARE EDUCATION/TRAINING PROGRAM

## 2022-12-14 NOTE — PROGRESS NOTES
Reason For Consultation:   Increased lifetime risk of breast cancer    Referring Provider:   Silvestre Umaña MD  [address] [facility] [facility]  [address]  [place] [address]    Records Obtained: Records of the patients history including those obtained from the referring provider were reviewed and summarized in detail.    HPI:   Parris Guevara is a very pleasant 56yo woman who presents for consultation of increased risk of breast cancer. She is postmenopausal. She presented for screening mammogram on 22 which was benign but revealed a Tyrer-Cuzick score of 12.2%. However, based on her genetic testing results her T-C score increased to 27.6% and Myriad Breast Cancer Riskscore 25.8%.       Today she reports that she is feeling well and denies any recent breast changes or concerns.    High Risk Breast cancer specific history:  - Age: 57 y.o.   - Height:  5'8''  - Weight: 205lbs  - Breast density per BI-RADS:  fibroglandular density  - Age at menarche:  12yo  - Number of pregnancies: ; age of first live birth: 31yo  - History of breast feeding: No  - Age at menopause, if applicable:  early 40s. S/p KIRSTIE-BSO   -Uterus and ovaries intact: No: S/p KIRSTIE-BSO   - HRT: No  - Genetic testing: Yes - MyRisk testing negative  - Personal history of cancer: No  - Previous chest radiation exposure between ages 10-30 years old: No  - Personal history of breast biopsy: No  - [employer] Inheritance: No  - Family history of cancer:  Yes. Sister- diagnosed with breast cancer at 41yo, passed away at 48yo; maternal cousins diagnosed with breast cancer x2, each in their 20s; mother-hepatobiliary cancer    Social History:  Tobacco use:  Denies  Alcohol use:  Denies  Exercise regimen: No regular exercise, but remains very active watching 4 grandchildren      SEE CALCULATED RISK BELOW.     Past Medical   Past Medical History:   Diagnosis Date    Arthritis     Diabetes mellitus     Diabetes mellitus, type 2     Essential (primary)  hypertension     Fatty liver     Hyperlipidemia     Hypertension     Hyperthyroidism     Hypothyroidism     Psoriasis     Spleen enlarged      Patient Active Problem List   Diagnosis    Class 1 obesity due to excess calories with serious comorbidity and body mass index (BMI) of 33.0 to 33.9 in adult    Primary osteoarthritis of both knees    Plaque psoriasis    Subacromial bursitis of right shoulder joint    Type 2 diabetes mellitus without complication, without long-term current use of insulin    Postoperative hypothyroidism    Gallstones    Psoriatic arthritis    Pelvic mass    Hypertension associated with type 2 diabetes mellitus    Hyperlipidemia associated with type 2 diabetes mellitus    Nausea and vomiting    Elevated alkaline phosphatase level    Cyst of left ovary    S/P RA-TLH/BSO    Portal hypertension    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     Social History   Social History     Tobacco Use    Smoking status: Never    Smokeless tobacco: Never   Substance Use Topics    Alcohol use: Not Currently    Drug use: Not Currently     Family History  Family History   Problem Relation Age of Onset    Cancer Mother         liver    Liver cancer Mother     Cirrhosis Mother         CARTER    Cancer Sister         breast    Breast cancer Sister     Cancer Brother     Breast cancer Cousin     Breast cancer Cousin     Colon cancer Neg Hx     Ovarian cancer Neg Hx      Medications    Current Outpatient Medications:     atorvastatin (LIPITOR) 40 MG tablet, Take 40 mg by mouth once daily., Disp: , Rfl:     folic acid (FOLVITE) 1 MG tablet, Take 1,000 mcg by mouth once daily., Disp: , Rfl:     levothyroxine (SYNTHROID) 125 MCG tablet, Take 1 tablet (125 mcg total) by mouth once daily., Disp: 90 tablet, Rfl: 1    metFORMIN (GLUCOPHAGE-XR) 500 MG ER 24hr tablet, Take 1,000 mg by mouth every morning., Disp: , Rfl:     valsartan (DIOVAN) 320 MG tablet, Take 1 tablet (320 mg total) by mouth once daily., Disp: 90 tablet, Rfl: 3     "ergocalciferol (ERGOCALCIFEROL) 50,000 unit Cap, Take 50,000 Units by mouth every 7 days., Disp: , Rfl:     hepatitis A and B vaccine, PF, (TWINRIX) 720 SUDHAKAR unit- 20 mcg/mL Syrg suspension, Inject into the muscle., Disp: 1 mL, Rfl: 2    ondansetron (ZOFRAN) 4 MG tablet, Take by mouth 2 (two) times daily., Disp: , Rfl:     ondansetron (ZOFRAN-ODT) 4 MG TbDL, Take 1 tablet (4 mg total) by mouth every 6 (six) hours as needed (nausea). (Patient not taking: Reported on 12/14/2022), Disp: 30 tablet, Rfl: 0    pantoprazole (PROTONIX) 20 MG tablet, Take 1 tablet (20 mg total) by mouth once daily. for 14 days, Disp: 14 tablet, Rfl: 0    promethazine (PHENERGAN) 12.5 MG Tab, Take 1 tablet (12.5 mg total) by mouth every 6 (six) hours as needed for Nausea (nausea/vomiting). (Patient not taking: Reported on 12/14/2022), Disp: 12 tablet, Rfl: 0    semaglutide (OZEMPIC) 1 mg/dose (2 mg/1.5 mL) PnIj, Inject 1 mg into the skin every 7 days. (Patient not taking: Reported on 12/14/2022), Disp: 6 pen, Rfl: 3  Allergies  Review of patient's allergies indicates:  No Known Allergies    Review of Systems  12pt ROS negative except as noted above    Objective:      Vitals:   Vitals:    12/14/22 1037   BP: (!) 145/81   Pulse: 91   Resp: 18   Temp: 97.8 °F (36.6 °C)   TempSrc: Oral   SpO2: 99%   Weight: 93.9 kg (206 lb 14.4 oz)   Height: 5' 7" (1.702 m)     BMI: Body mass index is 32.41 kg/m².   Body surface area is 2.11 meters squared.    Physical Exam  ECOG 0   General: well appearing, in no apparent distress  HEENT: Normocephalic, EOMI, anicteric sclerae, MMM  Neck: supple, without cervical or supraclavicular lymphadenopathy.  Heart: regular rate and rhythm, normal S1 and S2, no murmurs, gallops or rubs.  Lungs: Clear to auscultation bilaterally, no increased wob  Breast: no appreciable masses, skin changes or nipple abnormality bilaterally, no axillary LAD bilaterally   Abdomen: Soft, nontender, nondistended with normal bowel sounds. No " hepatosplenomegaly.  Extremities: No LE edema or joint effusion  Skin: warm, well-perfused, no rash  Neurologic: Alert and oriented x 4, normal speech and gait   Psychiatric: Conversing appropriately with providers throughout today's encounter.      Laboratory Data: reviewed most recent   Imaging: reviewed most recent      Assessment:   Ms. Guevara is a very pleasant 58yo woman who presents today for evaluation of high risk for breast cancer:    1. Increased risk of breast cancer:  Tyrer-Cuzick (TC) lifetime risk of 27.6% (based on genetic testing results). We discussed that TC score will categorize your lifetime risk of being diagnosed with breast cancer. Categories are as such: Average risk <15%, Intermediate risk 15-19%, and High risk > or = to 20%. I reviewed the recommendations for women with elevated TC score including clinical encounter every 6-12mo, annual screening mammogram, annual breast MRI, consideration of risk reduction strategies and breast awareness.     I reviewed that risk factors are categorized into 2 groups: Modifiable and Non-modifiable. Modifiable risk factors include use of hormones, alcohol, smoking, diet and exercise. Non-modifiable risk factors include breast density, genetics, chest radiation, previous pregnancies, age of first period, and age of menopause. For women at high risk for breast cancer, endocrine therapy can reduce the risk of invasive and/or in situ breast cancers. Discussed that Tamoxifen 20 mg daily for 5 years has shown to reduce risk of breast cancer by 49% and women with ADH/ALH or LCIS have an even more significant reduction of risk of 86%. Aromatase inhibitors for 5 years have also shown risk reduction in terms of 50-60%. At current, there is not adequate data to recommend longer courses of therapy more than 5 years for risk reduction. The above have been shown to lower the risk of breast cancer incidence, however there is no survival benefit in patients who don't have  breast cancer.    We reviewed the following Lifestyle modifications which have shown benefit:  Limit alcohol consumption to less than 1 drink per day (1 ounce liquor, 6 oz wine, 8 oz beer)  Avoid smoking.  Exercise at least 150 minutes per week of moderate intensity aerobic activity or at least 75 minutes of vigorous activity. Exercise can lower the relative risk of breast cancer by ~18-20%.  Maintain healthy weight and avoid post-menopausal weight gain. Avoid processed foods and eat more lean proteins, fruits and vegetables.       Discussed available resources including genetic counseling, nutrition, weight management and reviewed future screening:   Semiannual (every 6 months) CBE (clinical breast exam).   Annual Breast MRI alternating with an annual MMG.             Plan:     Patient has opted out of chemoprevention but will call in the future if she wishes to pursue. Patient elects to proceed with alternating annual mammogram and annual breast MRI along with semiannual CBEs.  Patient will follow up with PCP or GYN for semiannual CBE along with annual mammogram in 9/2023 and annual breast MRI in 3/2023. After her first MRI, she can continue to follow with gynecology or PCP for subsequent breast imaging.  Lifestyle modifications as detailed above.   5.   Encouraged breast awareness, including monthly breast self-exams.   6.   She has already received Genetic counseling.       Questions were encouraged and answered to patient's satisfaction, and patient verbalized understanding of information and agreement with the plan. Advised patient to RTC with any interval changes or concerns. Will plan to see her back in 1 year or sooner should the need arise.     Kasey Corrigan MD      Med Onc Chart Routing      Follow up with physician 1 year.   Follow up with [facility]    Infusion scheduling note    Injection scheduling note    Labs    Imaging MRI   MRI breast due 3/2023   Pharmacy appointment    Other referrals

## 2022-12-14 NOTE — TELEPHONE ENCOUNTER
Spoke with pt.  She got bx results from Adeola Hilliard NP.  She has appointment to follow up with Adeola in May.

## 2022-12-14 NOTE — TELEPHONE ENCOUNTER
[facility]    Value: Liver, random (transjugular biopsy):   Advanced stage fibrosis:  Cirrhosis   Residual steatohepatitis, 10-20% steatosis.  See comment   No hepatocyte iron, macrophages iron present   Iron and trichrome stains are performed with appropriate controls.    Comment: Interp By Caryl Melendez M.D., Signed on 11/16/2022 at 11:21     Please inform patient that her liver biopsy showed cirrhosis with fatty liver disease associated with inflammation.   The pressures in the veins of the liver are mildly elevated (9 mm of mercury) due to cirrhosis.  She has been seen by Adeola Hilliard NP, since her biopsy.  Please confirm she will follow-up with Adeola in 6 months (May 2023). .   Thank you.  Nelda Barger MD

## 2022-12-21 ENCOUNTER — OFFICE VISIT (OUTPATIENT)
Dept: INTERNAL MEDICINE | Facility: CLINIC | Age: 57
End: 2022-12-21
Payer: MEDICAID

## 2022-12-21 VITALS
RESPIRATION RATE: 20 BRPM | WEIGHT: 208.13 LBS | BODY MASS INDEX: 31.54 KG/M2 | SYSTOLIC BLOOD PRESSURE: 139 MMHG | OXYGEN SATURATION: 99 % | TEMPERATURE: 98 F | HEIGHT: 68 IN | HEART RATE: 97 BPM | DIASTOLIC BLOOD PRESSURE: 88 MMHG

## 2022-12-21 DIAGNOSIS — E89.0 POSTOPERATIVE HYPOTHYROIDISM: ICD-10-CM

## 2022-12-21 DIAGNOSIS — E11.9 TYPE 2 DIABETES MELLITUS WITHOUT COMPLICATION, WITHOUT LONG-TERM CURRENT USE OF INSULIN: Primary | ICD-10-CM

## 2022-12-21 DIAGNOSIS — I15.2 HYPERTENSION ASSOCIATED WITH TYPE 2 DIABETES MELLITUS: ICD-10-CM

## 2022-12-21 DIAGNOSIS — E11.59 HYPERTENSION ASSOCIATED WITH TYPE 2 DIABETES MELLITUS: ICD-10-CM

## 2022-12-21 DIAGNOSIS — E11.69 HYPERLIPIDEMIA ASSOCIATED WITH TYPE 2 DIABETES MELLITUS: ICD-10-CM

## 2022-12-21 DIAGNOSIS — E66.09 CLASS 1 OBESITY DUE TO EXCESS CALORIES WITH SERIOUS COMORBIDITY AND BODY MASS INDEX (BMI) OF 31.0 TO 31.9 IN ADULT: ICD-10-CM

## 2022-12-21 DIAGNOSIS — E78.5 HYPERLIPIDEMIA ASSOCIATED WITH TYPE 2 DIABETES MELLITUS: ICD-10-CM

## 2022-12-21 PROCEDURE — 3066F NEPHROPATHY DOC TX: CPT | Mod: CPTII,,, | Performed by: HOSPITALIST

## 2022-12-21 PROCEDURE — 3008F PR BODY MASS INDEX (BMI) DOCUMENTED: ICD-10-PCS | Mod: CPTII,,, | Performed by: HOSPITALIST

## 2022-12-21 PROCEDURE — 90471 IMMUNIZATION ADMIN: CPT | Mod: PBBFAC,PO

## 2022-12-21 PROCEDURE — 3061F NEG MICROALBUMINURIA REV: CPT | Mod: CPTII,,, | Performed by: HOSPITALIST

## 2022-12-21 PROCEDURE — 3066F PR DOCUMENTATION OF TREATMENT FOR NEPHROPATHY: ICD-10-PCS | Mod: CPTII,,, | Performed by: HOSPITALIST

## 2022-12-21 PROCEDURE — 3008F BODY MASS INDEX DOCD: CPT | Mod: CPTII,,, | Performed by: HOSPITALIST

## 2022-12-21 PROCEDURE — 99214 PR OFFICE/OUTPT VISIT, EST, LEVL IV, 30-39 MIN: ICD-10-PCS | Mod: S$PBB,,, | Performed by: HOSPITALIST

## 2022-12-21 PROCEDURE — 99214 OFFICE O/P EST MOD 30 MIN: CPT | Mod: PBBFAC,PO | Performed by: HOSPITALIST

## 2022-12-21 PROCEDURE — 99999 PR PBB SHADOW E&M-EST. PATIENT-LVL IV: ICD-10-PCS | Mod: PBBFAC,,, | Performed by: HOSPITALIST

## 2022-12-21 PROCEDURE — 99214 OFFICE O/P EST MOD 30 MIN: CPT | Mod: S$PBB,,, | Performed by: HOSPITALIST

## 2022-12-21 PROCEDURE — 99999 PR PBB SHADOW E&M-EST. PATIENT-LVL IV: CPT | Mod: PBBFAC,,, | Performed by: HOSPITALIST

## 2022-12-21 PROCEDURE — 3075F PR MOST RECENT SYSTOLIC BLOOD PRESS GE 130-139MM HG: ICD-10-PCS | Mod: CPTII,,, | Performed by: HOSPITALIST

## 2022-12-21 PROCEDURE — 4010F ACE/ARB THERAPY RXD/TAKEN: CPT | Mod: CPTII,,, | Performed by: HOSPITALIST

## 2022-12-21 PROCEDURE — 3079F PR MOST RECENT DIASTOLIC BLOOD PRESSURE 80-89 MM HG: ICD-10-PCS | Mod: CPTII,,, | Performed by: HOSPITALIST

## 2022-12-21 PROCEDURE — 4010F PR ACE/ARB THEARPY RXD/TAKEN: ICD-10-PCS | Mod: CPTII,,, | Performed by: HOSPITALIST

## 2022-12-21 PROCEDURE — 3044F PR MOST RECENT HEMOGLOBIN A1C LEVEL <7.0%: ICD-10-PCS | Mod: CPTII,,, | Performed by: HOSPITALIST

## 2022-12-21 PROCEDURE — 3079F DIAST BP 80-89 MM HG: CPT | Mod: CPTII,,, | Performed by: HOSPITALIST

## 2022-12-21 PROCEDURE — 1159F MED LIST DOCD IN RCRD: CPT | Mod: CPTII,,, | Performed by: HOSPITALIST

## 2022-12-21 PROCEDURE — 1160F RVW MEDS BY RX/DR IN RCRD: CPT | Mod: CPTII,,, | Performed by: HOSPITALIST

## 2022-12-21 PROCEDURE — 3075F SYST BP GE 130 - 139MM HG: CPT | Mod: CPTII,,, | Performed by: HOSPITALIST

## 2022-12-21 PROCEDURE — 1159F PR MEDICATION LIST DOCUMENTED IN MEDICAL RECORD: ICD-10-PCS | Mod: CPTII,,, | Performed by: HOSPITALIST

## 2022-12-21 PROCEDURE — 1160F PR REVIEW ALL MEDS BY PRESCRIBER/CLIN PHARMACIST DOCUMENTED: ICD-10-PCS | Mod: CPTII,,, | Performed by: HOSPITALIST

## 2022-12-21 PROCEDURE — 3044F HG A1C LEVEL LT 7.0%: CPT | Mod: CPTII,,, | Performed by: HOSPITALIST

## 2022-12-21 PROCEDURE — 3061F PR NEG MICROALBUMINURIA RESULT DOCUMENTED/REVIEW: ICD-10-PCS | Mod: CPTII,,, | Performed by: HOSPITALIST

## 2022-12-21 NOTE — PROGRESS NOTES
Subjective:     @Patient ID: Parris Guevara is a 57 y.o. female.    Chief Complaint: Follow-up    HPI  58 yo F with HTN, DM2, HLD, psoriatic arthritis, Grave's disease hypothyroidism s/p partial thyroidectomy at age 9 yo, presents for:         1. DM2: last a1c 5.7; On metformin XR 1000 mg bid  . Foot exam: due  Eye exam: due. She stopped ozempic 2/2 to nausea/vomiting and diarrhea   2. HTN: now on valsartan 320 mg qday. No longer on losartan or amlodipine.  Reports BP usually 130/80s at home   3. HLD: atorvastatin 40 mg qday  4. Ovarian mass: diagnosed on CT scan in ER. Also had ultrasound. Seenon  with GYN. Underwent s/p hysterectomy and BSO. Path showed ROSALBA I otherwise no malignancy  5. 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  Pt Reports her mother  of CARTER cirrhosis, liver cancer. She is now following with Hepatology clinic   5. Psoriatic arthritis: on methotrexate, folic acid 1 mg qday and Celebrex. Follows with rheumatology at [facility] Dr Thao Haynes.    6. Hypothyroidism s/p partial thyroidectomy: on synthroid 137 mcg qday  7. Obesity: pt limited with activity with hx of arthritis   8. Gallstones:CT scan on 7/15 showed small calcified gallstones     Review of Systems   Constitutional:  Negative for chills and fever.   HENT:  Negative for congestion and sore throat.    Eyes:  Negative for pain and visual disturbance.   Respiratory:  Negative for cough and shortness of breath.    Cardiovascular:  Negative for chest pain and leg swelling.   Gastrointestinal:  Negative for abdominal pain, nausea and vomiting.   Endocrine: Negative for polydipsia and polyuria.   Genitourinary:  Negative for difficulty urinating and dysuria.   Musculoskeletal:  Negative for arthralgias and back pain.   Skin:  Negative for rash and wound.   Neurological:  Negative for dizziness, weakness and headaches.   Psychiatric/Behavioral:  Negative for agitation and confusion.    Past medical history, surgical history, and  "family medical history reviewed and updated as appropriate.    Medications and allergies reviewed.     Objective:     Vitals:    12/21/22 1021   BP: (!) 150/90   BP Location: Right arm   Patient Position: Sitting   BP Method: Medium (Manual)   Pulse: 97   Resp: 20   Temp: 97.8 °F (36.6 °C)   TempSrc: Temporal   SpO2: 99%   Weight: 94.4 kg (208 lb 1.8 oz)   Height: 5' 8" (1.727 m)     Body mass index is 31.64 kg/m².  Physical Exam  Constitutional:       Appearance: Normal appearance.   HENT:      Head: Normocephalic and atraumatic.   Eyes:      General:         Right eye: No discharge.         Left eye: No discharge.      Conjunctiva/sclera: Conjunctivae normal.   Cardiovascular:      Rate and Rhythm: Normal rate and regular rhythm.      Heart sounds: No murmur heard.  Pulmonary:      Effort: Pulmonary effort is normal.      Breath sounds: Normal breath sounds.   Musculoskeletal:         General: Normal range of motion.      Cervical back: Normal range of motion and neck supple.      Right lower leg: No edema.      Left lower leg: No edema.   Skin:     General: Skin is warm and dry.   Neurological:      Mental Status: She is alert and oriented to person, place, and time.   Psychiatric:         Mood and Affect: Mood normal.         Behavior: Behavior normal.     Lab Results   Component Value Date    WBC 6.27 12/14/2022    HGB 11.7 (L) 12/14/2022    HCT 36.3 (L) 12/14/2022     12/14/2022    CHOL 134 08/10/2022    TRIG 86 08/10/2022    HDL 40 08/10/2022    ALT 19 12/14/2022    AST 36 12/14/2022     12/14/2022    K 4.2 12/14/2022     12/14/2022    CREATININE 0.8 12/14/2022    BUN 9 12/14/2022    CO2 24 12/14/2022    TSH 0.601 12/14/2022    INR 1.1 11/01/2022    HGBA1C 5.0 12/14/2022       Assessment:     1. Type 2 diabetes mellitus without complication, without long-term current use of insulin    2. Hyperlipidemia associated with type 2 diabetes mellitus    3. Hypertension associated with type 2 " diabetes mellitus    4. Postoperative hypothyroidism      Plan:   Tia was seen today for follow-up.    Diagnoses and all orders for this visit:    Type 2 diabetes mellitus without complication, without long-term current use of insulin  - Stable. Continue home meds   -     Ambulatory referral/consult to Optometry; Future  -     Hemoglobin A1C; Future  -     Comprehensive Metabolic Panel; Future  -     Lipid Panel; Future  -     CBC W/ AUTO DIFFERENTIAL; Future    Hyperlipidemia associated with type 2 diabetes mellitus  - Stable. Continue home meds   -     Hemoglobin A1C; Future  -     Comprehensive Metabolic Panel; Future  -     Lipid Panel; Future  -     CBC W/ AUTO DIFFERENTIAL; Future    Hypertension associated with type 2 diabetes mellitus  - Stable. Continue home meds .    Postoperative hypothyroidism  - Stable. Continue home meds     Class 1 obesity due to excess calories with serious comorbidity and body mass index (BMI) of 31.0 to 31.9 in adult   Weight is improving was 222 lb in August. Now 208 lb  Encouraged exercise activity as tolerated    Other orders  -     Influenza - Quadrivalent (PF)    Reviewed lab results in clinic          Reyna Duval MD  Internal Medicine    12/21/2022

## 2023-01-10 ENCOUNTER — PATIENT MESSAGE (OUTPATIENT)
Dept: PHARMACY | Facility: CLINIC | Age: 58
End: 2023-01-10
Payer: MEDICAID

## 2023-01-12 ENCOUNTER — PATIENT MESSAGE (OUTPATIENT)
Dept: PHARMACY | Facility: CLINIC | Age: 58
End: 2023-01-12
Payer: MEDICAID

## 2023-01-13 ENCOUNTER — IMMUNIZATION (OUTPATIENT)
Dept: PHARMACY | Facility: CLINIC | Age: 58
End: 2023-01-13
Payer: MEDICAID

## 2023-01-30 DIAGNOSIS — J01.90 ACUTE BACTERIAL SINUSITIS: Primary | ICD-10-CM

## 2023-01-30 DIAGNOSIS — B96.89 ACUTE BACTERIAL SINUSITIS: Primary | ICD-10-CM

## 2023-01-30 RX ORDER — AZITHROMYCIN 250 MG/1
TABLET, FILM COATED ORAL
Qty: 6 TABLET | Refills: 0 | Status: SHIPPED | OUTPATIENT
Start: 2023-01-30 | End: 2023-02-04

## 2023-02-24 ENCOUNTER — PATIENT OUTREACH (OUTPATIENT)
Dept: ADMINISTRATIVE | Facility: HOSPITAL | Age: 58
End: 2023-02-24
Payer: MEDICAID

## 2023-02-24 ENCOUNTER — PATIENT MESSAGE (OUTPATIENT)
Dept: ADMINISTRATIVE | Facility: HOSPITAL | Age: 58
End: 2023-02-24
Payer: MEDICAID

## 2023-03-14 ENCOUNTER — HOSPITAL ENCOUNTER (OUTPATIENT)
Dept: RADIOLOGY | Facility: HOSPITAL | Age: 58
Discharge: HOME | End: 2023-03-14
Attending: STUDENT IN AN ORGANIZED HEALTH CARE EDUCATION/TRAINING PROGRAM
Payer: MEDICAID

## 2023-03-14 DIAGNOSIS — Z91.89 INCREASED RISK OF BREAST CANCER: ICD-10-CM

## 2023-03-14 PROCEDURE — 77049 MRI BREAST W/WO CONTRAST, W/CAD, BILATERAL: ICD-10-PCS | Mod: 26,,, | Performed by: RADIOLOGY

## 2023-03-14 PROCEDURE — 77049 MRI BREAST C-+ W/CAD BI: CPT | Mod: 26,,, | Performed by: RADIOLOGY

## 2023-03-14 PROCEDURE — 25500020 PHARM REV CODE 255: Performed by: STUDENT IN AN ORGANIZED HEALTH CARE EDUCATION/TRAINING PROGRAM

## 2023-03-14 PROCEDURE — A9577 INJ MULTIHANCE: HCPCS | Performed by: STUDENT IN AN ORGANIZED HEALTH CARE EDUCATION/TRAINING PROGRAM

## 2023-03-14 PROCEDURE — 77049 MRI BREAST C-+ W/CAD BI: CPT | Mod: TC

## 2023-03-14 RX ADMIN — GADOBENATE DIMEGLUMINE 20 ML: 529 INJECTION, SOLUTION INTRAVENOUS at 11:03

## 2023-03-28 RX ORDER — LEVOTHYROXINE SODIUM 125 UG/1
TABLET ORAL
Qty: 90 TABLET | Refills: 2 | Status: SHIPPED | OUTPATIENT
Start: 2023-03-28 | End: 2023-12-09

## 2023-03-28 NOTE — TELEPHONE ENCOUNTER
Refill Decision Note   Parris Guevara  is requesting a refill authorization.  Brief Assessment and Rationale for Refill:  Approve     Medication Therapy Plan:       Medication Reconciliation Completed: No   Comments:     No Care Gaps recommended.     Note composed:7:44 AM 03/28/2023

## 2023-03-28 NOTE — TELEPHONE ENCOUNTER
No new care gaps identified.  [facility] Embedded Care Gaps. Reference number: [id number]. 3/28/2023   12:14:27 AM RAFIAT

## 2023-04-11 ENCOUNTER — PATIENT MESSAGE (OUTPATIENT)
Dept: RESEARCH | Facility: HOSPITAL | Age: 58
End: 2023-04-11
Payer: MEDICAID

## 2023-05-23 ENCOUNTER — HOSPITAL ENCOUNTER (OUTPATIENT)
Dept: RADIOLOGY | Facility: OTHER | Age: 58
Discharge: HOME | End: 2023-05-23
Attending: NURSE PRACTITIONER
Payer: MEDICAID

## 2023-05-23 DIAGNOSIS — K74.60 LIVER CIRRHOSIS SECONDARY TO NASH: ICD-10-CM

## 2023-05-23 DIAGNOSIS — K75.81 LIVER CIRRHOSIS SECONDARY TO NASH: ICD-10-CM

## 2023-05-23 PROCEDURE — 76705 ECHO EXAM OF ABDOMEN: CPT | Mod: TC

## 2023-05-23 PROCEDURE — 76705 US ABDOMEN LIMITED: ICD-10-PCS | Mod: 26,,, | Performed by: RADIOLOGY

## 2023-05-23 PROCEDURE — 76705 ECHO EXAM OF ABDOMEN: CPT | Mod: 26,,, | Performed by: RADIOLOGY

## 2023-05-30 ENCOUNTER — OFFICE VISIT (OUTPATIENT)
Dept: HEPATOLOGY | Facility: CLINIC | Age: 58
End: 2023-05-30
Payer: MEDICAID

## 2023-05-30 VITALS — BODY MASS INDEX: 32.27 KG/M2 | HEIGHT: 68 IN | WEIGHT: 212.94 LBS | RESPIRATION RATE: 18 BRPM

## 2023-05-30 DIAGNOSIS — E11.59 HYPERTENSION ASSOCIATED WITH TYPE 2 DIABETES MELLITUS: ICD-10-CM

## 2023-05-30 DIAGNOSIS — E11.69 HYPERLIPIDEMIA ASSOCIATED WITH TYPE 2 DIABETES MELLITUS: ICD-10-CM

## 2023-05-30 DIAGNOSIS — I85.10 SECONDARY ESOPHAGEAL VARICES WITHOUT BLEEDING: ICD-10-CM

## 2023-05-30 DIAGNOSIS — E78.5 HYPERLIPIDEMIA ASSOCIATED WITH TYPE 2 DIABETES MELLITUS: ICD-10-CM

## 2023-05-30 DIAGNOSIS — K75.81 LIVER CIRRHOSIS SECONDARY TO NASH: Primary | ICD-10-CM

## 2023-05-30 DIAGNOSIS — K74.60 LIVER CIRRHOSIS SECONDARY TO NASH: Primary | ICD-10-CM

## 2023-05-30 DIAGNOSIS — K76.6 PORTAL HYPERTENSION: ICD-10-CM

## 2023-05-30 DIAGNOSIS — E66.9 OBESITY (BMI 30.0-34.9): ICD-10-CM

## 2023-05-30 DIAGNOSIS — I15.2 HYPERTENSION ASSOCIATED WITH TYPE 2 DIABETES MELLITUS: ICD-10-CM

## 2023-05-30 DIAGNOSIS — E11.9 TYPE 2 DIABETES MELLITUS WITHOUT COMPLICATION, WITHOUT LONG-TERM CURRENT USE OF INSULIN: ICD-10-CM

## 2023-05-30 PROCEDURE — 99214 PR OFFICE/OUTPT VISIT, EST, LEVL IV, 30-39 MIN: ICD-10-PCS | Mod: S$PBB,,, | Performed by: NURSE PRACTITIONER

## 2023-05-30 PROCEDURE — 3008F PR BODY MASS INDEX (BMI) DOCUMENTED: ICD-10-PCS | Mod: CPTII,,, | Performed by: NURSE PRACTITIONER

## 2023-05-30 PROCEDURE — 99214 OFFICE O/P EST MOD 30 MIN: CPT | Mod: PBBFAC | Performed by: NURSE PRACTITIONER

## 2023-05-30 PROCEDURE — 4010F ACE/ARB THERAPY RXD/TAKEN: CPT | Mod: CPTII,,, | Performed by: NURSE PRACTITIONER

## 2023-05-30 PROCEDURE — 1159F PR MEDICATION LIST DOCUMENTED IN MEDICAL RECORD: ICD-10-PCS | Mod: CPTII,,, | Performed by: NURSE PRACTITIONER

## 2023-05-30 PROCEDURE — 1159F MED LIST DOCD IN RCRD: CPT | Mod: CPTII,,, | Performed by: NURSE PRACTITIONER

## 2023-05-30 PROCEDURE — 99999 PR PBB SHADOW E&M-EST. PATIENT-LVL IV: ICD-10-PCS | Mod: PBBFAC,,, | Performed by: NURSE PRACTITIONER

## 2023-05-30 PROCEDURE — 99999 PR PBB SHADOW E&M-EST. PATIENT-LVL IV: CPT | Mod: PBBFAC,,, | Performed by: NURSE PRACTITIONER

## 2023-05-30 PROCEDURE — 99214 OFFICE O/P EST MOD 30 MIN: CPT | Mod: S$PBB,,, | Performed by: NURSE PRACTITIONER

## 2023-05-30 PROCEDURE — 4010F PR ACE/ARB THEARPY RXD/TAKEN: ICD-10-PCS | Mod: CPTII,,, | Performed by: NURSE PRACTITIONER

## 2023-05-30 PROCEDURE — 1160F RVW MEDS BY RX/DR IN RCRD: CPT | Mod: CPTII,,, | Performed by: NURSE PRACTITIONER

## 2023-05-30 PROCEDURE — 3008F BODY MASS INDEX DOCD: CPT | Mod: CPTII,,, | Performed by: NURSE PRACTITIONER

## 2023-05-30 PROCEDURE — 1160F PR REVIEW ALL MEDS BY PRESCRIBER/CLIN PHARMACIST DOCUMENTED: ICD-10-PCS | Mod: CPTII,,, | Performed by: NURSE PRACTITIONER

## 2023-05-30 RX ORDER — PREDNISONE 10 MG/1
TABLET ORAL DAILY
COMMUNITY

## 2023-05-30 RX ORDER — CALCIUM CARBONATE 500(1250)
2 TABLET ORAL DAILY
COMMUNITY

## 2023-05-30 NOTE — PROGRESS NOTES
[facility] Established Patient Visit    Reason for Visit:  cirrhosis      PCP: Reyna Duval    HPI:  This is a 57 y.o. female with PMH noted below, here for follow up of Well-compensated cirrhosis likely due to fatty liver versus MTX   Was on MTX for years, stopped ~9/2022    RA and PSA followed by rheumatology at [facility]    Diagnosis of cirrhosis based on  liver biopsy  11/2022    Serological workup was negative for Levar's, alpha-1 antitrypsin deficiency, hemochromatosis, and viral hepatitis B and C    Prior serologic workup:   Lab Results   Component Value Date    FERRITIN 122 11/23/2022    FESATURATED 19 (L) 11/23/2022    CERULOPLSM 27.0 11/23/2022    HEPBSAG Non-reactive 11/23/2022    HEPCAB Non-reactive 11/23/2022     Risk factors for NAFLD include obesity, HTN, HLD, DM    Interval HPI: Presents today alone. No s/s of hepatic decompensation: no ascites, HE or h/o variceal bleeding. On Otezla per outside rheum   Some intermittent RUQ pain, gallstones on US, resolves on its own fairly quickly without other symptoms     Abd US done 5/2023 showed nodular contour liver, no lesions, + splenomegaly    Lab Results   Component Value Date    ALT 33 05/23/2023    AST 37 05/23/2023    ALKPHOS 160 (H) 05/23/2023    BILITOT 0.6 05/23/2023    ALBUMIN 3.7 05/23/2023    INR 1.0 05/23/2023     (L) 05/23/2023     MELD-Na: 6 at 5/23/2023 10:44 AM  MELD: 6 at 5/23/2023 10:44 AM  Calculated from:  Serum Creatinine: 0.8 mg/dL (Using min of 1 mg/dL) at 5/23/2023 10:44 AM  Serum Sodium: 142 mmol/L (Using max of 137 mmol/L) at 5/23/2023 10:44 AM  Total Bilirubin: 0.6 mg/dL (Using min of 1 mg/dL) at 5/23/2023 10:44 AM  INR(ratio): 1.0 at 5/23/2023 10:44 AM  MELD 6    Cirrhosis Health Maintenance:   -- Last EGD 11/2022 with small varices, next due 11/2023, order placed   -- Due for next colonoscopy : 2027  -- HCC screening   CT  no lesions, next due 11/2023   AFP  WNL, next due 11/2023  Lab Results    Component Value Date    AFP 7.4 05/23/2023       -- Immunity to Hep A and B - needs last TwinRix 6/2023    + family history of liver disease: mother with CARTER cirrhosis. Denies current alcohol consumption  or history of significant alcohol consumption in past   Social History     Substance and Sexual Activity   Alcohol Use Not Currently         PMHX:  has a past medical history of Arthritis, Diabetes mellitus, Diabetes mellitus, type 2, Essential (primary) hypertension, Fatty liver, Hyperlipidemia, Hypertension, Hyperthyroidism, Hypothyroidism, Psoriasis, and Spleen enlarged.    PSHX:  has a past surgical history that includes Thyroidectomy, partial; xi robotic hysterectomy, with salpingo-oophorectomy (Bilateral, 9/12/2022); Robot-assisted laparoscopic lysis of adhesions using da Ema Xi (N/A, 9/12/2022); Esophagogastroduodenoscopy (N/A, 11/16/2022); and Colonoscopy (N/A, 11/16/2022).    The patient's social and family histories were reviewed by me and updated in the appropriate section of the electronic medical record.    Review of patient's allergies indicates:   Allergen Reactions    Ozempic [semaglutide]      Nausea/vomiting, diarrhea        Current Outpatient Medications on File Prior to Visit   Medication Sig Dispense Refill    atorvastatin (LIPITOR) 40 MG tablet Take 40 mg by mouth once daily.      calcium carbonate (OS-EDITA) 500 mg calcium (1,250 mg) tablet Take 2 tablets by mouth once daily.      ergocalciferol (ERGOCALCIFEROL) 50,000 unit Cap Take 50,000 Units by mouth every 7 days.      folic acid (FOLVITE) 1 MG tablet Take 1,000 mcg by mouth once daily.      hepatitis A and B vaccine, PF, (TWINRIX) 720 SUDHAKAR unit- 20 mcg/mL Syrg suspension Inject into the muscle. 1 mL 2    levothyroxine (SYNTHROID) 125 MCG tablet TAKE 1 TABLET BY MOUTH ONCE DAILY. 90 tablet 2    metFORMIN (GLUCOPHAGE-XR) 500 MG ER 24hr tablet Take 1,000 mg by mouth 2 (two) times daily with meals.      ondansetron (ZOFRAN) 4 MG tablet  "Take by mouth 2 (two) times daily.      predniSONE (DELTASONE) 10 mg tablet pack Take by mouth once daily. Taking 1 half a day      valsartan (DIOVAN) 320 MG tablet Take 1 tablet (320 mg total) by mouth once daily. 90 tablet 3    ondansetron (ZOFRAN-ODT) 4 MG TbDL Take 1 tablet (4 mg total) by mouth every 6 (six) hours as needed (nausea). (Patient not taking: Reported on 12/14/2022) 30 tablet 0    pantoprazole (PROTONIX) 20 MG tablet Take 1 tablet (20 mg total) by mouth once daily. for 14 days 14 tablet 0    promethazine (PHENERGAN) 12.5 MG Tab Take 1 tablet (12.5 mg total) by mouth every 6 (six) hours as needed for Nausea (nausea/vomiting). (Patient not taking: Reported on 12/14/2022) 12 tablet 0     No current facility-administered medications on file prior to visit.       SOCIAL HISTORY:   Social History     Substance and Sexual Activity   Alcohol Use Not Currently       Social History     Substance and Sexual Activity   Drug Use Not Currently       ROS:   GENERAL: Denies fatigue  CARDIOVASCULAR: Denies edema  GI: Denies abdominal pain  SKIN: Denies rash, itching   NEURO: Denies confusion, memory loss, or mood changes    Objective Findings:    PHYSICAL EXAM:   Friendly Black or  female, in no acute distress; alert and oriented to person, place and time  VITALS: Resp 18   Ht 5' 8" (1.727 m)   Wt 96.6 kg (212 lb 15.4 oz)   BMI 32.38 kg/m²   EYES: Sclerae anicteric  GI: Soft, non-tender, non-distended. No ascites.  EXTREMITIES:  No edema.  SKIN: Warm and dry. No jaundice. No telangectasias noted. No palmar erythema.  NEURO:  No asterixis.  PSYCH:  Thought and speech pattern appropriate. Behavior normal      EDUCATION:  See instructions discussed with patient in Instructions section of the After Visit Summary       ASSESSMENT & PLAN:  57 y.o. Black or  female with:  1.  Cirrhosis, likely due to CARTER versus MTX (?), well compensated  --- cirrhosis diagnosis based on biopsy  -- " MELD-Na: 6 at 5/23/2023 10:44 AM  MELD: 6 at 5/23/2023 10:44 AM  Calculated from:  Serum Creatinine: 0.8 mg/dL (Using min of 1 mg/dL) at 5/23/2023 10:44 AM  Serum Sodium: 142 mmol/L (Using max of 137 mmol/L) at 5/23/2023 10:44 AM  Total Bilirubin: 0.6 mg/dL (Using min of 1 mg/dL) at 5/23/2023 10:44 AM  INR(ratio): 1.0 at 5/23/2023 10:44 AM  -- HCC screening: AFP and abd. U/S.. U/S showed no lesons, AFP AFP - both next due 11/2023  -- Immunity to Hep A and B - see HPI  --- Serological workup: see HPI  -- Cirrhosis counseling as noted above and discussed with patient    2.  Fatty liver  -- risk factors for fatty liver: obesity, HTN, HLD, DM  Recommend:  1. Weight loss goal of 20 lbs  2. Low carb/sugar, high fiber and protein diet  3. Exercise, 5 days per week, 30 minutes per day, as tolerated  4. Recommend good cholesterol, blood pressure, blood sugar levels     3. Portal hypertension   -- EGD 11/2022 noted small varices, repeat 11/2023, order placed  -- No Ascites or ankle edema   -- + Splenomegaly        Follow up in about 6 months (around 11/30/2023). with US and labs a few days before  Orders Placed This Encounter   Procedures    US Abdomen Limited    AFP Tumor Marker    CBC Without Differential    Comprehensive Metabolic Panel    Protime-INR    Ambulatory referral/consult to Endo Procedure         Thank you for allowing me to participate in the care of Parris Demi Dias ARABELLA Oshea    I spent a total of 30 minutes on the day of the visit.This includes face to face time and non-face to face time preparing to see the patient (eg, review of tests), obtaining and/or reviewing separately obtained history, documenting clinical information in the electronic or other health record, independently interpreting results and communicating results to the patient/family/caregiver, and coordinating care.       CC'ed note to:

## 2023-06-09 ENCOUNTER — PATIENT OUTREACH (OUTPATIENT)
Dept: ADMINISTRATIVE | Facility: HOSPITAL | Age: 58
End: 2023-06-09
Payer: MEDICAID

## 2023-06-09 NOTE — PROGRESS NOTES
Health Maintenance Due   Topic Date Due    Pneumococcal Vaccines (Age 0-64) (1 - PCV) Never done    Foot Exam  Never done    Eye Exam  Never done    HIV Screening  Never done    TETANUS VACCINE  Never done    Shingles Vaccine (1 of 2) Never done    COVID-19 Vaccine (4 - Pfizer series) 12/16/2021     Chart reviewed.   Immunizations: Reconciled  Orders placed: N/A  Upcoming appts to satisfy CAROLA topics: Labs 6/14/2023

## 2023-06-14 ENCOUNTER — LAB VISIT (OUTPATIENT)
Dept: LAB | Facility: HOSPITAL | Age: 58
End: 2023-06-14
Attending: HOSPITALIST
Payer: MEDICAID

## 2023-06-14 DIAGNOSIS — E11.69 HYPERLIPIDEMIA ASSOCIATED WITH TYPE 2 DIABETES MELLITUS: ICD-10-CM

## 2023-06-14 DIAGNOSIS — E11.9 TYPE 2 DIABETES MELLITUS WITHOUT COMPLICATION, WITHOUT LONG-TERM CURRENT USE OF INSULIN: ICD-10-CM

## 2023-06-14 DIAGNOSIS — E78.5 HYPERLIPIDEMIA ASSOCIATED WITH TYPE 2 DIABETES MELLITUS: ICD-10-CM

## 2023-06-14 LAB
ALBUMIN SERPL BCP-MCNC: 3.2 G/DL (ref 3.5–5.2)
ALP SERPL-CCNC: 139 U/L (ref 55–135)
ALT SERPL W/O P-5'-P-CCNC: 21 U/L (ref 10–44)
ANION GAP SERPL CALC-SCNC: 11 MMOL/L (ref 8–16)
AST SERPL-CCNC: 27 U/L (ref 10–40)
BASOPHILS # BLD AUTO: 0.04 K/UL (ref 0–0.2)
BASOPHILS NFR BLD: 0.6 % (ref 0–1.9)
BILIRUB SERPL-MCNC: 0.6 MG/DL (ref 0.1–1)
BUN SERPL-MCNC: 9 MG/DL (ref 6–20)
CALCIUM SERPL-MCNC: 9.4 MG/DL (ref 8.7–10.5)
CHLORIDE SERPL-SCNC: 104 MMOL/L (ref 95–110)
CHOLEST SERPL-MCNC: 155 MG/DL (ref 120–199)
CHOLEST/HDLC SERPL: 2.7 {RATIO} (ref 2–5)
CO2 SERPL-SCNC: 26 MMOL/L (ref 23–29)
CREAT SERPL-MCNC: 0.7 MG/DL (ref 0.5–1.4)
DIFFERENTIAL METHOD: ABNORMAL
EOSINOPHIL # BLD AUTO: 0.2 K/UL (ref 0–0.5)
EOSINOPHIL NFR BLD: 2.4 % (ref 0–8)
ERYTHROCYTE [DISTWIDTH] IN BLOOD BY AUTOMATED COUNT: 13.8 % (ref 11.5–14.5)
EST. GFR  (NO RACE VARIABLE): >60 ML/MIN/1.73 M^2
ESTIMATED AVG GLUCOSE: 120 MG/DL (ref 68–131)
GLUCOSE SERPL-MCNC: 98 MG/DL (ref 70–110)
HBA1C MFR BLD: 5.8 % (ref 4–5.6)
HCT VFR BLD AUTO: 37.3 % (ref 37–48.5)
HDLC SERPL-MCNC: 58 MG/DL (ref 40–75)
HDLC SERPL: 37.4 % (ref 20–50)
HGB BLD-MCNC: 11.8 G/DL (ref 12–16)
IMM GRANULOCYTES # BLD AUTO: 0.01 K/UL (ref 0–0.04)
IMM GRANULOCYTES NFR BLD AUTO: 0.2 % (ref 0–0.5)
LDLC SERPL CALC-MCNC: 80 MG/DL (ref 63–159)
LYMPHOCYTES # BLD AUTO: 1.7 K/UL (ref 1–4.8)
LYMPHOCYTES NFR BLD: 26.1 % (ref 18–48)
MCH RBC QN AUTO: 29 PG (ref 27–31)
MCHC RBC AUTO-ENTMCNC: 31.6 G/DL (ref 32–36)
MCV RBC AUTO: 92 FL (ref 82–98)
MONOCYTES # BLD AUTO: 0.5 K/UL (ref 0.3–1)
MONOCYTES NFR BLD: 7.1 % (ref 4–15)
NEUTROPHILS # BLD AUTO: 4.2 K/UL (ref 1.8–7.7)
NEUTROPHILS NFR BLD: 63.6 % (ref 38–73)
NONHDLC SERPL-MCNC: 97 MG/DL
NRBC BLD-RTO: 0 /100 WBC
PLATELET # BLD AUTO: 144 K/UL (ref 150–450)
PMV BLD AUTO: 11.7 FL (ref 9.2–12.9)
POTASSIUM SERPL-SCNC: 3.8 MMOL/L (ref 3.5–5.1)
PROT SERPL-MCNC: 7.2 G/DL (ref 6–8.4)
RBC # BLD AUTO: 4.07 M/UL (ref 4–5.4)
SODIUM SERPL-SCNC: 141 MMOL/L (ref 136–145)
TRIGL SERPL-MCNC: 85 MG/DL (ref 30–150)
WBC # BLD AUTO: 6.63 K/UL (ref 3.9–12.7)

## 2023-06-14 PROCEDURE — 36415 COLL VENOUS BLD VENIPUNCTURE: CPT | Mod: PO | Performed by: HOSPITALIST

## 2023-06-14 PROCEDURE — 80061 LIPID PANEL: CPT | Performed by: HOSPITALIST

## 2023-06-14 PROCEDURE — 80053 COMPREHEN METABOLIC PANEL: CPT | Performed by: HOSPITALIST

## 2023-06-14 PROCEDURE — 83036 HEMOGLOBIN GLYCOSYLATED A1C: CPT | Performed by: HOSPITALIST

## 2023-06-14 PROCEDURE — 85025 COMPLETE CBC W/AUTO DIFF WBC: CPT | Performed by: HOSPITALIST

## 2023-06-21 ENCOUNTER — OFFICE VISIT (OUTPATIENT)
Dept: INTERNAL MEDICINE | Facility: CLINIC | Age: 58
End: 2023-06-21
Payer: MEDICAID

## 2023-06-21 VITALS
WEIGHT: 209 LBS | OXYGEN SATURATION: 97 % | SYSTOLIC BLOOD PRESSURE: 152 MMHG | HEART RATE: 81 BPM | DIASTOLIC BLOOD PRESSURE: 92 MMHG | TEMPERATURE: 98 F | BODY MASS INDEX: 31.78 KG/M2 | RESPIRATION RATE: 14 BRPM

## 2023-06-21 DIAGNOSIS — E89.0 POSTOPERATIVE HYPOTHYROIDISM: ICD-10-CM

## 2023-06-21 DIAGNOSIS — E11.9 TYPE 2 DIABETES MELLITUS WITHOUT COMPLICATION, WITHOUT LONG-TERM CURRENT USE OF INSULIN: Primary | ICD-10-CM

## 2023-06-21 DIAGNOSIS — E78.5 HYPERLIPIDEMIA ASSOCIATED WITH TYPE 2 DIABETES MELLITUS: ICD-10-CM

## 2023-06-21 DIAGNOSIS — K59.00 CONSTIPATION, UNSPECIFIED CONSTIPATION TYPE: ICD-10-CM

## 2023-06-21 DIAGNOSIS — I15.2 HYPERTENSION ASSOCIATED WITH TYPE 2 DIABETES MELLITUS: ICD-10-CM

## 2023-06-21 DIAGNOSIS — E66.9 OBESITY (BMI 30.0-34.9): ICD-10-CM

## 2023-06-21 DIAGNOSIS — R00.2 PALPITATIONS: ICD-10-CM

## 2023-06-21 DIAGNOSIS — Z00.00 ANNUAL PHYSICAL EXAM: Primary | ICD-10-CM

## 2023-06-21 DIAGNOSIS — E11.69 HYPERLIPIDEMIA ASSOCIATED WITH TYPE 2 DIABETES MELLITUS: ICD-10-CM

## 2023-06-21 DIAGNOSIS — E11.59 HYPERTENSION ASSOCIATED WITH TYPE 2 DIABETES MELLITUS: ICD-10-CM

## 2023-06-21 DIAGNOSIS — E55.9 VITAMIN D DEFICIENCY: ICD-10-CM

## 2023-06-21 DIAGNOSIS — E11.9 TYPE 2 DIABETES MELLITUS WITHOUT COMPLICATION, WITHOUT LONG-TERM CURRENT USE OF INSULIN: ICD-10-CM

## 2023-06-21 PROCEDURE — 3044F HG A1C LEVEL LT 7.0%: CPT | Mod: CPTII,,, | Performed by: HOSPITALIST

## 2023-06-21 PROCEDURE — 1160F PR REVIEW ALL MEDS BY PRESCRIBER/CLIN PHARMACIST DOCUMENTED: ICD-10-PCS | Mod: CPTII,,, | Performed by: HOSPITALIST

## 2023-06-21 PROCEDURE — 4010F ACE/ARB THERAPY RXD/TAKEN: CPT | Mod: CPTII,,, | Performed by: HOSPITALIST

## 2023-06-21 PROCEDURE — 99999 PR PBB SHADOW E&M-EST. PATIENT-LVL III: CPT | Mod: PBBFAC,,, | Performed by: HOSPITALIST

## 2023-06-21 PROCEDURE — 4010F PR ACE/ARB THEARPY RXD/TAKEN: ICD-10-PCS | Mod: CPTII,,, | Performed by: HOSPITALIST

## 2023-06-21 PROCEDURE — 3008F PR BODY MASS INDEX (BMI) DOCUMENTED: ICD-10-PCS | Mod: CPTII,,, | Performed by: HOSPITALIST

## 2023-06-21 PROCEDURE — 3080F DIAST BP >= 90 MM HG: CPT | Mod: CPTII,,, | Performed by: HOSPITALIST

## 2023-06-21 PROCEDURE — 3077F PR MOST RECENT SYSTOLIC BLOOD PRESSURE >= 140 MM HG: ICD-10-PCS | Mod: CPTII,,, | Performed by: HOSPITALIST

## 2023-06-21 PROCEDURE — 99213 OFFICE O/P EST LOW 20 MIN: CPT | Mod: PBBFAC,PO | Performed by: HOSPITALIST

## 2023-06-21 PROCEDURE — 1159F PR MEDICATION LIST DOCUMENTED IN MEDICAL RECORD: ICD-10-PCS | Mod: CPTII,,, | Performed by: HOSPITALIST

## 2023-06-21 PROCEDURE — 1160F RVW MEDS BY RX/DR IN RCRD: CPT | Mod: CPTII,,, | Performed by: HOSPITALIST

## 2023-06-21 PROCEDURE — 90677 PCV20 VACCINE IM: CPT | Mod: PBBFAC,PO

## 2023-06-21 PROCEDURE — 3080F PR MOST RECENT DIASTOLIC BLOOD PRESSURE >= 90 MM HG: ICD-10-PCS | Mod: CPTII,,, | Performed by: HOSPITALIST

## 2023-06-21 PROCEDURE — 1159F MED LIST DOCD IN RCRD: CPT | Mod: CPTII,,, | Performed by: HOSPITALIST

## 2023-06-21 PROCEDURE — 99214 PR OFFICE/OUTPT VISIT, EST, LEVL IV, 30-39 MIN: ICD-10-PCS | Mod: S$PBB,,, | Performed by: HOSPITALIST

## 2023-06-21 PROCEDURE — 3077F SYST BP >= 140 MM HG: CPT | Mod: CPTII,,, | Performed by: HOSPITALIST

## 2023-06-21 PROCEDURE — 3044F PR MOST RECENT HEMOGLOBIN A1C LEVEL <7.0%: ICD-10-PCS | Mod: CPTII,,, | Performed by: HOSPITALIST

## 2023-06-21 PROCEDURE — 3008F BODY MASS INDEX DOCD: CPT | Mod: CPTII,,, | Performed by: HOSPITALIST

## 2023-06-21 PROCEDURE — 99214 OFFICE O/P EST MOD 30 MIN: CPT | Mod: S$PBB,,, | Performed by: HOSPITALIST

## 2023-06-21 PROCEDURE — 99999 PR PBB SHADOW E&M-EST. PATIENT-LVL III: ICD-10-PCS | Mod: PBBFAC,,, | Performed by: HOSPITALIST

## 2023-06-21 RX ORDER — SEMAGLUTIDE 0.68 MG/ML
0.5 INJECTION, SOLUTION SUBCUTANEOUS
Qty: 3 ML | Refills: 0 | Status: SHIPPED | OUTPATIENT
Start: 2023-06-21 | End: 2023-07-25 | Stop reason: SDUPTHER

## 2023-06-21 RX ORDER — LACTULOSE 10 G/15ML
15 SOLUTION ORAL 2 TIMES DAILY PRN
Qty: 1000 ML | Refills: 0 | Status: SHIPPED | OUTPATIENT
Start: 2023-06-21 | End: 2023-12-19

## 2023-06-21 NOTE — PROGRESS NOTES
Subjective:     @Patient ID: Parris Guevara is a 57 y.o. female.    Chief Complaint: Follow-up    HPI    58 yo F with HTN, DM2, HLD, psoriatic arthritis, Grave's disease hypothyroidism s/p partial thyroidectomy at age 9 yo, presents for:         1. DM2: last a1c 5.8; On metformin  mg bid  . Foot exam: due  Eye exam: due. She stopped ozempic 2/2 to nausea/vomiting and diarrhea. Reports she would like to restart it. States was able to tolerate it awhile ago.   2. HTN: now on valsartan 320 mg qday. No longer on losartan or amlodipine.  Reports BP usually 130/80s at home   3. HLD: atorvastatin 40 mg qday  4. Ovarian mass: diagnosed on CT scan in ER. Also had ultrasound. Seen on  with GYN. Underwent s/p hysterectomy and BSO. Path showed ROSALBA I otherwise no malignancy  5. 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  Pt reports her mother  of CARTER cirrhosis, liver cancer. She is now following with Hepatology clinic   5. Psoriatic arthritis and PMR: on folic acid 1 mg qday  Follows with rheumatology at [facility] Dr Thao Haynes.  no longer on MTX as possibly contributed to liver cirrhosis. Also recently dx with PMR. Reports on prednisone; symptoms of leg weakness/pain have improved but still has symptoms of L upper leg.   6. Hypothyroidism s/p partial thyroidectomy: on synthroid 137 mcg qday  7. Obesity: pt limited with activity with hx of arthritis   8. Gallstones:CT scan on 7/15 showed small calcified gallstones   9. Dysphagia: reports lately having trouble with foods; has scheduled barium enema at [facility]. Did have EGD 2022   10. Palpitations: reports intermittent palpitations over the years; reports EKGs in the past were normal. Last occurred over 1-2 weeks ago. Was sitting on couch when it happened   11. Constipation: has tried dulcolax, mag citrate and miralax. Also tried stool softener     Review of Systems   Constitutional:  Negative for chills and fever.   HENT:  Positive for trouble  swallowing. Negative for congestion and sore throat.    Eyes:  Negative for pain and visual disturbance.   Respiratory:  Negative for cough and shortness of breath.    Cardiovascular:  Negative for chest pain and leg swelling.   Gastrointestinal:  Negative for abdominal pain, nausea and vomiting.   Endocrine: Negative for polydipsia and polyuria.   Genitourinary:  Negative for difficulty urinating and dysuria.   Musculoskeletal:  Positive for arthralgias and myalgias. Negative for back pain.   Skin:  Negative for rash and wound.   Neurological:  Negative for dizziness, weakness and headaches.   Psychiatric/Behavioral:  Negative for agitation and confusion.    Past medical history, surgical history, and family medical history reviewed and updated as appropriate.    Medications and allergies reviewed.     Objective:     There were no vitals filed for this visit.  There is no height or weight on file to calculate BMI.  Physical Exam  Constitutional:       Appearance: Normal appearance.   HENT:      Head: Normocephalic and atraumatic.   Eyes:      General:         Right eye: No discharge.         Left eye: No discharge.      Conjunctiva/sclera: Conjunctivae normal.   Cardiovascular:      Rate and Rhythm: Normal rate and regular rhythm.      Pulses:           Dorsalis pedis pulses are 2+ on the right side and 2+ on the left side.        Posterior tibial pulses are 2+ on the right side and 2+ on the left side.      Heart sounds: No murmur heard.  Pulmonary:      Effort: Pulmonary effort is normal.      Breath sounds: Normal breath sounds.   Musculoskeletal:         General: Normal range of motion.      Cervical back: Normal range of motion and neck supple.      Right lower leg: No edema.      Left lower leg: No edema.   Feet:      Right foot:      Protective Sensation: 7 sites tested.  7 sites sensed.      Skin integrity: Callus present.      Toenail Condition: Right toenails are normal.      Left foot:      Protective  Sensation: 7 sites tested.  7 sites sensed.      Skin integrity: Callus present.      Toenail Condition: Left toenails are normal.   Skin:     General: Skin is warm and dry.   Neurological:      Mental Status: She is alert and oriented to person, place, and time.   Psychiatric:         Mood and Affect: Mood normal.         Behavior: Behavior normal.     Lab Results   Component Value Date    WBC 6.63 06/14/2023    HGB 11.8 (L) 06/14/2023    HCT 37.3 06/14/2023     (L) 06/14/2023    CHOL 155 06/14/2023    TRIG 85 06/14/2023    HDL 58 06/14/2023    ALT 21 06/14/2023    AST 27 06/14/2023     06/14/2023    K 3.8 06/14/2023     06/14/2023    CREATININE 0.7 06/14/2023    BUN 9 06/14/2023    CO2 26 06/14/2023    TSH 0.601 12/14/2022    INR 1.0 05/23/2023    HGBA1C 5.8 (H) 06/14/2023       Assessment:     1. Type 2 diabetes mellitus without complication, without long-term current use of insulin    2. Hypertension associated with type 2 diabetes mellitus    3. Hyperlipidemia associated with type 2 diabetes mellitus    4. Postoperative hypothyroidism    5. Obesity (BMI 30.0-34.9)    6. Palpitations    7. Constipation, unspecified constipation type      Plan:   Tia was seen today for follow-up.    Diagnoses and all orders for this visit:    Type 2 diabetes mellitus without complication, without long-term current use of insulin  - stop metformin. Will restart ozempic     Hypertension associated with type 2 diabetes mellitus  - pt reports usually controlled at home. Will get home readings in 2 weeks     Hyperlipidemia associated with type 2 diabetes mellitus  - Stable. Continue home meds     Postoperative hypothyroidism  - Stable. Continue home meds     Obesity (BMI 30.0-34.9)  - stable. Continue to monitor    Palpitations  -     Holter monitor - 48 hour; Future    Constipation  - counseled on daily fiber and hydration  - trial of lactulose prn    Other orders  -     (In Office Administered) Pneumococcal  Conjugate Vaccine (20 Valent) (IM)  -     semaglutide (OZEMPIC) 0.25 mg or 0.5 mg (2 mg/3 mL) pen injector; Inject 0.5 mg into the skin every 7 days.  -     lactulose (CHRONULAC) 20 gram/30 mL Soln; Take 23 mLs (15 g total) by mouth 2 (two) times daily as needed (constipation).       Rtc 6 months for annual     Reyna Duval MD  Internal Medicine    6/21/2023

## 2023-06-28 ENCOUNTER — TELEPHONE (OUTPATIENT)
Dept: INTERNAL MEDICINE | Facility: CLINIC | Age: 58
End: 2023-06-28
Payer: MEDICAID

## 2023-06-28 NOTE — TELEPHONE ENCOUNTER
----- Message from Reyna Duval MD sent at 6/21/2023  4:18 PM CDT -----  Regarding: bp check  Please get pt's latest blood pressure readings. Update in vitals section and notify MD

## 2023-06-29 ENCOUNTER — VITALS (OUTPATIENT)
Dept: INTERNAL MEDICINE | Facility: CLINIC | Age: 58
End: 2023-06-29
Payer: MEDICAID

## 2023-06-29 VITALS — DIASTOLIC BLOOD PRESSURE: 76 MMHG | HEART RATE: 97 BPM | SYSTOLIC BLOOD PRESSURE: 121 MMHG

## 2023-06-30 ENCOUNTER — HOSPITAL ENCOUNTER (OUTPATIENT)
Dept: CARDIOLOGY | Facility: HOSPITAL | Age: 58
Discharge: HOME | End: 2023-06-30
Attending: HOSPITALIST
Payer: MEDICAID

## 2023-06-30 DIAGNOSIS — R00.2 PALPITATIONS: ICD-10-CM

## 2023-06-30 PROCEDURE — 93227 HOLTER MONITOR - 48 HOUR (CUPID ONLY): ICD-10-PCS | Mod: ,,, | Performed by: STUDENT IN AN ORGANIZED HEALTH CARE EDUCATION/TRAINING PROGRAM

## 2023-06-30 PROCEDURE — 93226 XTRNL ECG REC<48 HR SCAN A/R: CPT

## 2023-06-30 PROCEDURE — 93227 XTRNL ECG REC<48 HR R&I: CPT | Mod: ,,, | Performed by: STUDENT IN AN ORGANIZED HEALTH CARE EDUCATION/TRAINING PROGRAM

## 2023-07-07 LAB
OHS CV EVENT MONITOR DAY: 0
OHS CV HOLTER LENGTH DECIMAL HOURS: 48
OHS CV HOLTER LENGTH HOURS: 48
OHS CV HOLTER LENGTH MINUTES: 0
OHS CV HOLTER SINUS AVERAGE HR: 90
OHS CV HOLTER SINUS MAX HR: 130
OHS CV HOLTER SINUS MIN HR: 64

## 2023-07-11 ENCOUNTER — OFFICE VISIT (OUTPATIENT)
Dept: OPTOMETRY | Facility: CLINIC | Age: 58
End: 2023-07-11
Payer: MEDICAID

## 2023-07-11 DIAGNOSIS — H04.123 BILATERAL DRY EYES: ICD-10-CM

## 2023-07-11 DIAGNOSIS — E11.9 TYPE 2 DIABETES MELLITUS WITHOUT RETINOPATHY: Primary | ICD-10-CM

## 2023-07-11 DIAGNOSIS — H00.029 MEIBOMITIS, UNSPECIFIED LATERALITY: ICD-10-CM

## 2023-07-11 DIAGNOSIS — E11.9 TYPE 2 DIABETES MELLITUS WITHOUT COMPLICATION, WITHOUT LONG-TERM CURRENT USE OF INSULIN: ICD-10-CM

## 2023-07-11 DIAGNOSIS — H25.13 NUCLEAR SCLEROSIS OF BOTH EYES: ICD-10-CM

## 2023-07-11 PROCEDURE — 1159F MED LIST DOCD IN RCRD: CPT | Mod: CPTII,,, | Performed by: OPTOMETRIST

## 2023-07-11 PROCEDURE — 2023F PR DILATED RETINAL EXAM W/O EVID OF RETINOPATHY: ICD-10-PCS | Mod: CPTII,,, | Performed by: OPTOMETRIST

## 2023-07-11 PROCEDURE — 1160F RVW MEDS BY RX/DR IN RCRD: CPT | Mod: CPTII,,, | Performed by: OPTOMETRIST

## 2023-07-11 PROCEDURE — 92004 COMPRE OPH EXAM NEW PT 1/>: CPT | Mod: S$PBB,,, | Performed by: OPTOMETRIST

## 2023-07-11 PROCEDURE — 3044F HG A1C LEVEL LT 7.0%: CPT | Mod: CPTII,,, | Performed by: OPTOMETRIST

## 2023-07-11 PROCEDURE — 1159F PR MEDICATION LIST DOCUMENTED IN MEDICAL RECORD: ICD-10-PCS | Mod: CPTII,,, | Performed by: OPTOMETRIST

## 2023-07-11 PROCEDURE — 99999 PR PBB SHADOW E&M-EST. PATIENT-LVL III: CPT | Mod: PBBFAC,,, | Performed by: OPTOMETRIST

## 2023-07-11 PROCEDURE — 4010F PR ACE/ARB THEARPY RXD/TAKEN: ICD-10-PCS | Mod: CPTII,,, | Performed by: OPTOMETRIST

## 2023-07-11 PROCEDURE — 99213 OFFICE O/P EST LOW 20 MIN: CPT | Mod: PBBFAC,PO | Performed by: OPTOMETRIST

## 2023-07-11 PROCEDURE — 4010F ACE/ARB THERAPY RXD/TAKEN: CPT | Mod: CPTII,,, | Performed by: OPTOMETRIST

## 2023-07-11 PROCEDURE — 3044F PR MOST RECENT HEMOGLOBIN A1C LEVEL <7.0%: ICD-10-PCS | Mod: CPTII,,, | Performed by: OPTOMETRIST

## 2023-07-11 PROCEDURE — 2023F DILAT RTA XM W/O RTNOPTHY: CPT | Mod: CPTII,,, | Performed by: OPTOMETRIST

## 2023-07-11 PROCEDURE — 99999 PR PBB SHADOW E&M-EST. PATIENT-LVL III: ICD-10-PCS | Mod: PBBFAC,,, | Performed by: OPTOMETRIST

## 2023-07-11 PROCEDURE — 1160F PR REVIEW ALL MEDS BY PRESCRIBER/CLIN PHARMACIST DOCUMENTED: ICD-10-PCS | Mod: CPTII,,, | Performed by: OPTOMETRIST

## 2023-07-11 PROCEDURE — 92004 PR EYE EXAM, NEW PATIENT,COMPREHESV: ICD-10-PCS | Mod: S$PBB,,, | Performed by: OPTOMETRIST

## 2023-07-11 NOTE — PROGRESS NOTES
HPI    DM   LAST BS  120  AVERAGE  LAST  A1C  UNSURE     BLURRED VA OU     OTC  READERS +2.00  LAST EYE VISIT WAS AT [facility] CHECK OU   Last edited by Jerrica Denise on 7/11/2023 12:52 PM.            Assessment /Plan     For exam results, see Encounter Report.    Type 2 diabetes mellitus without retinopathy    Type 2 diabetes mellitus without complication, without long-term current use of insulin  -     Ambulatory referral/consult to Optometry    Nuclear sclerosis of both eyes    Bilateral dry eyes    Meibomitis, unspecified laterality      1,2. No diabetic retinopathy, no csme. Return in 1 year for dilated eye exam.   3. Educated pt on presence of cataracts and effects on vision. No surgery at this time. Recheck in one year.   4,5. cAUSING INTERMITTENT BLUR AT DIST IN EVENING, Recommend artificial tears. 1 drop 2x per day. Chronicity of disease and treatment discussed.

## 2023-07-25 RX ORDER — SEMAGLUTIDE 0.68 MG/ML
0.5 INJECTION, SOLUTION SUBCUTANEOUS
Qty: 3 ML | Refills: 5 | Status: SHIPPED | OUTPATIENT
Start: 2023-07-25 | End: 2024-01-09 | Stop reason: SDUPTHER

## 2023-08-30 ENCOUNTER — CLINICAL SUPPORT (OUTPATIENT)
Dept: ENDOSCOPY | Facility: HOSPITAL | Age: 58
End: 2023-08-30
Payer: MEDICAID

## 2023-08-30 ENCOUNTER — TELEPHONE (OUTPATIENT)
Dept: ENDOSCOPY | Facility: HOSPITAL | Age: 58
End: 2023-08-30

## 2023-08-30 VITALS — HEIGHT: 68 IN | WEIGHT: 209 LBS | BODY MASS INDEX: 31.67 KG/M2

## 2023-08-30 DIAGNOSIS — I85.10 SECONDARY ESOPHAGEAL VARICES WITHOUT BLEEDING: ICD-10-CM

## 2023-08-30 NOTE — PLAN OF CARE
Spoke to patient to schedule procedure(s) EGD       Physician to perform procedure(s) Dr. OLIVIER Peralta  Date of Procedure (s) 10/17/23  Arrival Time 1:00 PM  Time of Procedure(s) 2:00 PM   Location of Procedure(s) [place] 4th Floor  Type of Rx Prep sent to patient: N/A  Instructions provided to patient via [facility] to be done prior to procedure    Patient was informed on the following information and verbalized understanding. Screening questionnaire reviewed with patient and complete. If procedure requires anesthesia, a responsible adult needs to be present to accompany the patient home, patient cannot drive after receiving anesthesia. Appointment details are tentative, especially check-in time. Patient will receive a prep-op call 4 days prior to confirm check-in time for procedure. If applicable the patient should contact their pharmacy to verify Rx for procedure prep is ready for pick-up. Patient was advised to call the scheduling department at [phone] if pharmacy states no Rx is available. Patient was advised to call the endoscopy scheduling department if any questions or concerns arise.      [facility] Scheduling Department

## 2023-08-30 NOTE — TELEPHONE ENCOUNTER
Spoke to patient to schedule procedure(s) EGD       Physician to perform procedure(s) Dr. OLIVIER Peralta  Date of Procedure (s) 10/17/23  Arrival Time 1:00 PM  Time of Procedure(s) 2:00 PM   Location of Procedure(s) [place] 4th Floor  Type of Rx Prep sent to patient: N/A  Instructions provided to patient via [facility] to be done prior to procedure    Patient was informed on the following information and verbalized understanding. Screening questionnaire reviewed with patient and complete. If procedure requires anesthesia, a responsible adult needs to be present to accompany the patient home, patient cannot drive after receiving anesthesia. Appointment details are tentative, especially check-in time. Patient will receive a prep-op call 4 days prior to confirm check-in time for procedure. If applicable the patient should contact their pharmacy to verify Rx for procedure prep is ready for pick-up. Patient was advised to call the scheduling department at [phone] if pharmacy states no Rx is available. Patient was advised to call the endoscopy scheduling department if any questions or concerns arise.      [facility] Scheduling Department

## 2023-09-27 DIAGNOSIS — Z12.31 OTHER SCREENING MAMMOGRAM: ICD-10-CM

## 2023-10-02 ENCOUNTER — PATIENT MESSAGE (OUTPATIENT)
Dept: ADMINISTRATIVE | Facility: HOSPITAL | Age: 58
End: 2023-10-02
Payer: MEDICAID

## 2023-10-04 RX ORDER — TRIAMCINOLONE ACETONIDE 1 MG/G
CREAM TOPICAL 2 TIMES DAILY
Qty: 80 G | Refills: 1 | Status: SHIPPED | OUTPATIENT
Start: 2023-10-04 | End: 2024-01-04 | Stop reason: SDUPTHER

## 2023-10-14 NOTE — TELEPHONE ENCOUNTER
Care Due:                  Date            Visit Type   Department     Provider  --------------------------------------------------------------------------------                                EP -                              PRIMARY      MET INTERNAL  Last Visit: 06-      CARE ([facility])   MEDICINE       Reyna  Simin                              EP -                              PRIMARY      [facility] INTERNAL  Next Visit: 12-      CARE ([facility])   [facility]                                                            Last  Test          Frequency    Reason                     Performed    Due Date  --------------------------------------------------------------------------------    HBA1C.......  6 months...  semaglutide..............  06- 12-    [facility] Embedded Care Due Messages. Reference number: [id number].   10/14/2023 8:47:28 AM CDT

## 2023-10-15 RX ORDER — VALSARTAN 320 MG/1
320 TABLET ORAL
Qty: 90 TABLET | Refills: 2 | Status: SHIPPED | OUTPATIENT
Start: 2023-10-15

## 2023-10-15 NOTE — TELEPHONE ENCOUNTER
Provider Staff:  Action required for this patient     Please see care gap opportunities below in Care Due Message.    Thanks!  [facility]     Appointments      Date Provider   Last Visit   6/21/2023 Reyna Duval MD   Next Visit   12/21/2023 Reyna Duval MD     Refill Decision Note   Parris Guevara  is requesting a refill authorization.  Brief Assessment and Rationale for Refill:  Approve     Medication Therapy Plan:         Comments:     Note composed:2:38 PM 10/15/2023

## 2023-10-17 ENCOUNTER — HOSPITAL ENCOUNTER (OUTPATIENT)
Facility: HOSPITAL | Age: 58
Discharge: HOME | End: 2023-10-17
Attending: INTERNAL MEDICINE | Admitting: INTERNAL MEDICINE
Payer: MEDICAID

## 2023-10-17 ENCOUNTER — ANESTHESIA EVENT (OUTPATIENT)
Dept: ENDOSCOPY | Facility: HOSPITAL | Age: 58
End: 2023-10-17
Payer: MEDICAID

## 2023-10-17 ENCOUNTER — ANESTHESIA (OUTPATIENT)
Dept: ENDOSCOPY | Facility: HOSPITAL | Age: 58
End: 2023-10-17
Payer: MEDICAID

## 2023-10-17 VITALS
RESPIRATION RATE: 18 BRPM | DIASTOLIC BLOOD PRESSURE: 62 MMHG | SYSTOLIC BLOOD PRESSURE: 114 MMHG | OXYGEN SATURATION: 99 % | HEART RATE: 83 BPM | WEIGHT: 186 LBS | HEIGHT: 67 IN | TEMPERATURE: 98 F | BODY MASS INDEX: 29.19 KG/M2

## 2023-10-17 DIAGNOSIS — I85.00 ESOPHAGEAL VARICES: ICD-10-CM

## 2023-10-17 DIAGNOSIS — I85.10 SECONDARY ESOPHAGEAL VARICES WITHOUT BLEEDING: Primary | ICD-10-CM

## 2023-10-17 LAB — POCT GLUCOSE: 76 MG/DL (ref 70–110)

## 2023-10-17 PROCEDURE — 63600175 PHARM REV CODE 636 W HCPCS: Performed by: NURSE ANESTHETIST, CERTIFIED REGISTERED

## 2023-10-17 PROCEDURE — E9220 PRA ENDO ANESTHESIA: ICD-10-PCS | Mod: ,,, | Performed by: NURSE ANESTHETIST, CERTIFIED REGISTERED

## 2023-10-17 PROCEDURE — 25000003 PHARM REV CODE 250: Performed by: NURSE ANESTHETIST, CERTIFIED REGISTERED

## 2023-10-17 PROCEDURE — E9220 PRA ENDO ANESTHESIA: HCPCS | Mod: ,,, | Performed by: NURSE ANESTHETIST, CERTIFIED REGISTERED

## 2023-10-17 PROCEDURE — 37000009 HC ANESTHESIA EA ADD 15 MINS: Performed by: INTERNAL MEDICINE

## 2023-10-17 PROCEDURE — 37000008 HC ANESTHESIA 1ST 15 MINUTES: Performed by: INTERNAL MEDICINE

## 2023-10-17 PROCEDURE — 25000003 PHARM REV CODE 250: Performed by: INTERNAL MEDICINE

## 2023-10-17 PROCEDURE — 43235 PR EGD, FLEX, DIAGNOSTIC: ICD-10-PCS | Mod: ,,, | Performed by: INTERNAL MEDICINE

## 2023-10-17 PROCEDURE — 43235 EGD DIAGNOSTIC BRUSH WASH: CPT | Mod: ,,, | Performed by: INTERNAL MEDICINE

## 2023-10-17 PROCEDURE — 43235 EGD DIAGNOSTIC BRUSH WASH: CPT | Performed by: INTERNAL MEDICINE

## 2023-10-17 RX ORDER — PROPOFOL 10 MG/ML
VIAL (ML) INTRAVENOUS
Status: DISCONTINUED | OUTPATIENT
Start: 2023-10-17 | End: 2023-10-17

## 2023-10-17 RX ORDER — SODIUM CHLORIDE 9 MG/ML
INJECTION, SOLUTION INTRAVENOUS CONTINUOUS
Status: DISCONTINUED | OUTPATIENT
Start: 2023-10-17 | End: 2023-10-17 | Stop reason: HOSPADM

## 2023-10-17 RX ORDER — LIDOCAINE HYDROCHLORIDE 20 MG/ML
INJECTION INTRAVENOUS
Status: DISCONTINUED | OUTPATIENT
Start: 2023-10-17 | End: 2023-10-17

## 2023-10-17 RX ADMIN — PROPOFOL 80 MG: 10 INJECTION, EMULSION INTRAVENOUS at 02:10

## 2023-10-17 RX ADMIN — LIDOCAINE HYDROCHLORIDE 100 MG: 20 INJECTION INTRAVENOUS at 02:10

## 2023-10-17 RX ADMIN — SODIUM CHLORIDE: 0.9 INJECTION, SOLUTION INTRAVENOUS at 01:10

## 2023-10-17 RX ADMIN — SODIUM CHLORIDE: 9 INJECTION, SOLUTION INTRAVENOUS at 02:10

## 2023-10-17 NOTE — ANESTHESIA PREPROCEDURE EVALUATION
10/17/2023  Parris Guevara is a 58 y.o., female.  Past Medical History:   Diagnosis Date    Arthritis     Diabetes mellitus     Diabetes mellitus, type 2     Essential (primary) hypertension     Fatty liver     Hyperlipidemia     Hypertension     Hyperthyroidism     Hypothyroidism     Psoriasis     Spleen enlarged      Past Surgical History:   Procedure Laterality Date    COLONOSCOPY N/A 11/16/2022    Procedure: COLONOSCOPY;  Surgeon: Obed Duran MD;  Location: [address]);  Service: Endoscopy;  Laterality: N/A;  per referral entered by Dr. Reyna Duval for screening colonosocpy to be scheduled with EGD/ EGD and colon combined-[facility]  Labs last on 10/19/22 and 11/1/22-[facility]  prep ins. on portal - [facility]    ESOPHAGOGASTRODUODENOSCOPY N/A 11/16/2022    Procedure: ESOPHAGOGASTRODUODENOSCOPY (EGD);  Surgeon: Obed Duran MD;  Location: [address]);  Service: Endoscopy;  Laterality: N/A;    ROBOT-ASSISTED LAPAROSCOPIC LYSIS OF ADHESIONS USING DA REED XI N/A 9/12/2022    Procedure: XI ROBOTIC LYSIS, ADHESIONS;  Surgeon: Silvestre Umaña MD;  Location: [facility];  Service: Oncology;  Laterality: N/A;    THYROIDECTOMY, PARTIAL      XI ROBOTIC HYSTERECTOMY, WITH SALPINGO-OOPHORECTOMY Bilateral 9/12/2022    Procedure: XI ROBOTIC HYSTERECTOMY,WITH SALPINGO-OOPHORECTOMY;  Surgeon: Silvestre Umaña MD;  Location: [facility];  Service: Oncology;  Laterality: Bilateral;         Pre-op Assessment    I have reviewed the Patient Summary Reports.     I have reviewed the Nursing Notes. I have reviewed the NPO Status.   I have reviewed the Medications.     Review of Systems  Anesthesia Hx:  No problems with previous Anesthesia  Denies Family Hx of Anesthesia complications.   Denies Personal Hx of Anesthesia complications.   Hematology/Oncology:  Hematology Normal   Oncology Normal      EENT/Dental:EENT/Dental Normal   Cardiovascular:   Hypertension    Hepatic/GI:   Bowel Prep. Liver Disease, Hepatitis    Musculoskeletal:   Arthritis     Neurological:  Neurology Normal    Endocrine:   Diabetes Hypothyroidism Hyperthyroidism    Dermatological:  Skin Normal    Psych:  Psychiatric Normal           Physical Exam  General: Well nourished    Airway:  Mallampati: II   Mouth Opening: Normal  TM Distance: Normal  Tongue: Normal  Neck ROM: Normal ROM    Dental:  Intact        Anesthesia Plan  Type of Anesthesia, risks & benefits discussed:    Anesthesia Type: Gen Natural Airway  Intra-op Monitoring Plan: Standard ASA Monitors  Informed Consent: Informed consent signed with the Patient and all parties understand the risks and agree with anesthesia plan.  All questions answered.   ASA Score: 3  Day of Surgery Review of History & Physical: H&P Update referred to the surgeon/provider.I have interviewed and examined the patient. I have reviewed the patient's H&P dated: There are no significant changes.     Ready For Surgery From Anesthesia Perspective.     .

## 2023-10-17 NOTE — TRANSFER OF CARE
"Anesthesia Transfer of Care Note    Patient: Parris Guevara    Procedure(s) Performed: Procedure(s) (LRB):  EGD (ESOPHAGOGASTRODUODENOSCOPY) (N/A)    Patient location: GI    Anesthesia Type: general    Transport from OR: Transported from OR on room air with adequate spontaneous ventilation    Post pain: adequate analgesia    Post assessment: no apparent anesthetic complications    Post vital signs: stable    Level of consciousness: awake    Nausea/Vomiting: no nausea/vomiting    Complications: none    Transfer of care protocol was followed      Last vitals:   Visit Vitals  BP 91/52  (BP Location: Left arm, Patient Position: Sitting)   Pulse 86   Temp 36.6 °C (97.9 °F) (Tympanic)   Resp 18   Ht 5' 7" (1.702 m)   Wt 84.4 kg (186 lb)   SpO2 98%   BMI 29.13 kg/m²     "

## 2023-10-17 NOTE — ANESTHESIA POSTPROCEDURE EVALUATION
Anesthesia Post Evaluation    Patient: Parris Guevara    Procedure(s) Performed: Procedure(s) (LRB):  EGD (ESOPHAGOGASTRODUODENOSCOPY) (N/A)    Final Anesthesia Type: general      Patient location during evaluation: GI PACU  Patient participation: Yes- Able to Participate  Level of consciousness: awake and alert  Post-procedure vital signs: reviewed and stable  Pain management: adequate  Airway patency: patent    PONV status at discharge: No PONV  Anesthetic complications: no      Cardiovascular status: blood pressure returned to baseline  Respiratory status: unassisted  Hydration status: euvolemic  Follow-up not needed.          Vitals Value Taken Time   /62 10/17/23 1519   Temp 36.6 °C (97.9 °F) 10/17/23 1502   Pulse 83 10/17/23 1519   Resp 18 10/17/23 1519   SpO2 99 % 10/17/23 1519         Event Time   Out of Recovery 15:42:46         Pain/Charlie Score: Charlie Score: 9 (10/17/2023  3:02 PM)

## 2023-10-17 NOTE — H&P
Short Stay Endoscopy History and Physical    PCP - Reyna Duval MD  Referring Physician - Adeola Hilliard NP  [address] [place] [address]    Procedure - EGD  ASA - per anesthesia  Mallampati - per anesthesia  History of Anesthesia problems - no  Family history Anesthesia problems -  no   Plan of anesthesia - General    HPI  58 y.o. female  Reason for procedure:  Varices surveillance    ROS:  Constitutional: No fevers, chills, No weight loss  CV: No chest pain  Pulm: No cough, No shortness of breath  GI: see HPI    Medical History:  has a past medical history of Arthritis, Diabetes mellitus, Diabetes mellitus, type 2, Essential (primary) hypertension, Fatty liver, Hyperlipidemia, Hypertension, Hyperthyroidism, Hypothyroidism, Psoriasis, and Spleen enlarged.    Surgical History:  has a past surgical history that includes Thyroidectomy, partial; xi robotic hysterectomy, with salpingo-oophorectomy (Bilateral, 9/12/2022); Robot-assisted laparoscopic lysis of adhesions using da Ema Xi (N/A, 9/12/2022); Esophagogastroduodenoscopy (N/A, 11/16/2022); and Colonoscopy (N/A, 11/16/2022).    Family History: family history includes Breast cancer in her cousin, cousin, and sister; Cancer in her brother, mother, and sister; Cirrhosis in her mother; Liver cancer in her mother..    Social History:  reports that she has never smoked. She has never used smokeless tobacco. She reports that she does not currently use alcohol. She reports that she does not currently use drugs.    Review of patient's allergies indicates:  No Known Allergies    Medications:   Medications Prior to Admission   Medication Sig Dispense Refill Last Dose    atorvastatin (LIPITOR) 40 MG tablet Take 40 mg by mouth once daily.   Past Week    levothyroxine (SYNTHROID) 125 MCG tablet TAKE 1 TABLET BY MOUTH ONCE DAILY. 90 tablet 2 10/17/2023    valsartan (DIOVAN) 320 MG tablet TAKE 1 TABLET BY MOUTH ONCE DAILY. 90 tablet 2 10/17/2023     calcium carbonate (OS-EDITA) 500 mg calcium (1,250 mg) tablet Take 2 tablets by mouth once daily.       ergocalciferol (ERGOCALCIFEROL) 50,000 unit Cap Take 50,000 Units by mouth every 7 days.       folic acid (FOLVITE) 1 MG tablet Take 1,000 mcg by mouth once daily.       lactulose (CHRONULAC) 20 gram/30 mL Soln Take 23 mLs (15 g total) by mouth 2 (two) times daily as needed (constipation). 1000 mL 0     pantoprazole (PROTONIX) 20 MG tablet Take 1 tablet (20 mg total) by mouth once daily. for 14 days 14 tablet 0     predniSONE (DELTASONE) 10 mg tablet pack Take by mouth once daily. Taking 1 half a day       semaglutide (OZEMPIC) 0.25 mg or 0.5 mg (2 mg/3 mL) pen injector Inject 0.5 mg into the skin every 7 days. 3 mL 5 10/3/2023    triamcinolone acetonide 0.1% (KENALOG) 0.1 % cream Apply topically 2 (two) times daily. Apply to affected area. Do not apply to face, groin or underarm 80 g 1        Physical Exam:    Vital Signs:   Vitals:    10/17/23 1325   BP: (!) 152/84   Pulse: 86   Resp: 18   Temp: 97.9 °F (36.6 °C)       General Appearance: Well appearing in no acute distress  Abdomen: Soft, non tender, non distended with normal bowel sounds, no masses      Labs:  Lab Results   Component Value Date    WBC 7.11 10/17/2023    HGB 12.7 10/17/2023    HCT 39.0 10/17/2023     10/17/2023    CHOL 155 06/14/2023    TRIG 85 06/14/2023    HDL 58 06/14/2023    ALT 21 06/14/2023    AST 27 06/14/2023     06/14/2023    K 3.8 06/14/2023     06/14/2023    CREATININE 0.7 06/14/2023    BUN 9 06/14/2023    CO2 26 06/14/2023    TSH 0.95 04/27/2023    INR 1.0 10/17/2023    HGBA1C 5.8 (H) 06/14/2023       I have explained the risks and benefits of this endoscopic procedure to the patient including but not limited to bleeding, inflammation, infection, perforation, and death.      Birgit Peralta MD

## 2023-10-17 NOTE — PROVATION PATIENT INSTRUCTIONS
Discharge Summary/Instructions after an Endoscopic Procedure  Patient Name: Parris Guevara  Patient MRN: [id number]  Patient YOB: 1965 Tuesday, October 17, 2023  Birgit Peralta MD  Dear patient,  As a result of recent federal legislation (The Federal Cures Act), you may   receive lab or pathology results from your procedure in your [facility]   account before your physician is able to contact you. Your physician or   their representative will relay the results to you with their   recommendations at their soonest availability.  Thank you,  RESTRICTIONS:  During your procedure today, you received medications for sedation.  These   medications may affect your judgment, balance and coordination.  Therefore,   for 24 hours, you have the following restrictions:   - DO NOT drive a car, operate machinery, make legal/financial decisions,   sign important papers or drink alcohol.    ACTIVITY:  Today: no heavy lifting, straining or running due to procedural   sedation/anesthesia.  The following day: return to full activity including work.  DIET:  Eat and drink normally unless instructed otherwise.     TREATMENT FOR COMMON SIDE EFFECTS:  - Mild abdominal pain, nausea, belching, bloating or excessive gas:  rest,   eat lightly and use a heating pad.  - Sore Throat: treat with throat lozenges and/or gargle with warm salt   water.  - Because air was used during the procedure, expelling large amounts of air   from your rectum or belching is normal.  - If a bowel prep was taken, you may not have a bowel movement for 1-3 days.    This is normal.  SYMPTOMS TO WATCH FOR AND REPORT TO YOUR PHYSICIAN:  1. Abdominal pain or bloating, other than gas cramps.  2. Chest pain.  3. Back pain.  4. Signs of infection such as: chills or fever occurring within 24 hours   after the procedure.  5. Rectal bleeding, which would show as bright red, maroon, or black stools.   (A tablespoon of blood from the rectum is not serious, especially if    hemorrhoids are present.)  6. Vomiting.  7. Weakness or dizziness.  GO DIRECTLY TO THE NEAREST EMERGENCY ROOM IF YOU HAVE ANY OF THE FOLLOWING:      Difficulty breathing              Chills and/or fever over 101 F   Persistent vomiting and/or vomiting blood   Severe abdominal pain   Severe chest pain   Black, tarry stools   Bleeding- more than one tablespoon   Any other symptom or condition that you feel may need urgent attention  Your doctor recommends these additional instructions:  If any biopsies were taken, your doctors clinic will contact you in 1 to 2   weeks with any results.  - Discharge patient to home.   - Patient has a contact number available for emergencies.  The signs and   symptoms of potential delayed complications were discussed with the   patient.  Return to normal activities tomorrow.  Written discharge   instructions were provided to the patient.   - Resume previous diet.   - Continue present medications.   - Await pathology results.   - Return to GI clinic as previously scheduled.   - Repeat upper endoscopy in 1 year for surveillance.   - Return to liver clinic as previously scheduled.   For questions, problems or results please call your physician - Birgit Peralta MD at Work:  [phone].  [facility], EMERGENCY ROOM PHONE NUMBER: [phone]  IF A COMPLICATION OR EMERGENCY SITUATION ARISES AND YOU ARE UNABLE TO REACH   YOUR PHYSICIAN - GO DIRECTLY TO THE EMERGENCY ROOM.  Birgit Peralta MD  10/17/2023 2:59:40 PM  This report has been verified and signed electronically.  Dear patient,  As a result of recent federal legislation (The Federal Cures Act), you may   receive lab or pathology results from your procedure in your [facility]   account before your physician is able to contact you. Your physician or   their representative will relay the results to you with their   recommendations at their soonest availability.  Thank you,  PROVATION

## 2023-10-17 NOTE — TRANSFER OF CARE
"Anesthesia Transfer of Care Note    Patient: Parris Guevara    Procedure(s) Performed: Procedure(s) (LRB):  EGD (ESOPHAGOGASTRODUODENOSCOPY) (N/A)    Patient location: PACU    Anesthesia Type: general    Transport from OR: Transported from OR on room air with adequate spontaneous ventilation    Post pain: adequate analgesia    Post assessment: no apparent anesthetic complications and tolerated procedure well    Post vital signs: stable    Level of consciousness: awake, alert and oriented    Nausea/Vomiting: no nausea/vomiting    Complications: none    Transfer of care protocol was followed      Last vitals:   Visit Vitals  BP (!) 106/58(BP Location: Left arm, Patient Position: Sitting)   Pulse 89   Temp 36.6 °C (97.9 °F) (Tympanic)   Resp 18   Ht 5' 7" (1.702 m)   Wt 84.4 kg (186 lb)   SpO2 98%   BMI 29.13 kg/m²     "

## 2023-10-18 RX ORDER — PANTOPRAZOLE SODIUM 40 MG/1
40 TABLET, DELAYED RELEASE ORAL DAILY
Qty: 90 TABLET | Refills: 3 | Status: SHIPPED | OUTPATIENT
Start: 2023-10-18

## 2023-11-07 ENCOUNTER — TELEPHONE (OUTPATIENT)
Dept: HEPATOLOGY | Facility: CLINIC | Age: 58
End: 2023-11-07
Payer: MEDICAID

## 2023-11-07 DIAGNOSIS — K74.60 LIVER CIRRHOSIS SECONDARY TO NASH: Primary | ICD-10-CM

## 2023-11-07 DIAGNOSIS — K75.81 LIVER CIRRHOSIS SECONDARY TO NASH: Primary | ICD-10-CM

## 2023-11-07 NOTE — TELEPHONE ENCOUNTER
Spoke with patient. She will do labs and u/s and cancelled follow up appointment. Provider will call for the result. Scheduled labs, u/s and follow up visit in 6 months. Mailed appointment reminder to patient.

## 2023-11-07 NOTE — TELEPHONE ENCOUNTER
Please call pt and notify her that I am out of the office on November 30th. Would it be ok if I messaged her on [facility] with results instead of doing a visit?      If so, please also schedule her for f/u with me in 6 months (either video or in person) with US and labs a few days before      Thanks !

## 2023-11-16 ENCOUNTER — HOSPITAL ENCOUNTER (OUTPATIENT)
Dept: RADIOLOGY | Facility: HOSPITAL | Age: 58
Discharge: HOME | End: 2023-11-16
Attending: NURSE PRACTITIONER
Payer: MEDICAID

## 2023-11-16 ENCOUNTER — TELEPHONE (OUTPATIENT)
Dept: HEPATOLOGY | Facility: CLINIC | Age: 58
End: 2023-11-16
Payer: MEDICAID

## 2023-11-16 DIAGNOSIS — K76.9 LIVER LESION: ICD-10-CM

## 2023-11-16 DIAGNOSIS — K75.81 LIVER CIRRHOSIS SECONDARY TO NASH: ICD-10-CM

## 2023-11-16 DIAGNOSIS — K74.60 LIVER CIRRHOSIS SECONDARY TO NASH: ICD-10-CM

## 2023-11-16 DIAGNOSIS — K76.9 LIVER DISEASE, UNSPECIFIED: Primary | ICD-10-CM

## 2023-11-16 PROCEDURE — 76705 ECHO EXAM OF ABDOMEN: CPT | Mod: 26,,, | Performed by: RADIOLOGY

## 2023-11-16 PROCEDURE — 76705 ECHO EXAM OF ABDOMEN: CPT | Mod: TC

## 2023-11-16 PROCEDURE — 76705 US ABDOMEN LIMITED: ICD-10-PCS | Mod: 26,,, | Performed by: RADIOLOGY

## 2023-11-16 NOTE — TELEPHONE ENCOUNTER
Called pt to discuss US with new liver lesion, need for MRI. Scheduled MRI and f/u appt. Pt verbalized understanding

## 2023-12-08 ENCOUNTER — HOSPITAL ENCOUNTER (OUTPATIENT)
Dept: RADIOLOGY | Facility: HOSPITAL | Age: 58
Discharge: HOME | End: 2023-12-08
Attending: NURSE PRACTITIONER
Payer: MEDICAID

## 2023-12-08 DIAGNOSIS — K76.9 LIVER LESION: ICD-10-CM

## 2023-12-08 DIAGNOSIS — K76.9 LIVER DISEASE, UNSPECIFIED: ICD-10-CM

## 2023-12-08 PROCEDURE — 25500020 PHARM REV CODE 255: Performed by: NURSE PRACTITIONER

## 2023-12-08 PROCEDURE — 74183 MRI ABDOMEN W WO CONTRAST: ICD-10-PCS | Mod: 26,,, | Performed by: STUDENT IN AN ORGANIZED HEALTH CARE EDUCATION/TRAINING PROGRAM

## 2023-12-08 PROCEDURE — 74183 MRI ABD W/O CNTR FLWD CNTR: CPT | Mod: TC

## 2023-12-08 PROCEDURE — A9585 GADOBUTROL INJECTION: HCPCS | Performed by: NURSE PRACTITIONER

## 2023-12-08 PROCEDURE — 74183 MRI ABD W/O CNTR FLWD CNTR: CPT | Mod: 26,,, | Performed by: STUDENT IN AN ORGANIZED HEALTH CARE EDUCATION/TRAINING PROGRAM

## 2023-12-08 RX ORDER — GADOBUTROL 604.72 MG/ML
10 INJECTION INTRAVENOUS
Status: COMPLETED | OUTPATIENT
Start: 2023-12-08 | End: 2023-12-08

## 2023-12-08 RX ADMIN — GADOBUTROL 10 ML: 604.72 INJECTION INTRAVENOUS at 07:12

## 2023-12-08 NOTE — TELEPHONE ENCOUNTER
No care due was identified.  [facility] Embedded Care Due Messages. Reference number: [id number].   12/08/2023 1:32:30 PM CST

## 2023-12-09 RX ORDER — LEVOTHYROXINE SODIUM 125 UG/1
125 TABLET ORAL
Qty: 90 TABLET | Refills: 1 | Status: SHIPPED | OUTPATIENT
Start: 2023-12-09

## 2023-12-09 NOTE — TELEPHONE ENCOUNTER
Refill Decision Note   Parris Ismael  is requesting a refill authorization.  Brief Assessment and Rationale for Refill:  Approve     Medication Therapy Plan:         Comments:     Note composed:11:21 AM 12/09/2023

## 2023-12-12 ENCOUNTER — TELEPHONE (OUTPATIENT)
Dept: HEPATOLOGY | Facility: CLINIC | Age: 58
End: 2023-12-12
Payer: MEDICAID

## 2023-12-12 NOTE — TELEPHONE ENCOUNTER
Patient: Parris Guevara       MRN: [id number]      : 1965     Age: 58 y.o.  [address] [facility] [address]    Providers: Adeola Hilliard NP    Priority of review: Other    Patient Transplant Status: Hepatology    Reason for presentation: Indeterminate lesion    Clinical Summary: 58 year old female with well compensated cirrhosis due to fatty liver versus MTX    Off MTX since     US 2023 noted 1.3 cm solid hypoechoic mass in the right l lobe of liver,  new since previous examination.  Differential diagnosis includes regenerating nodule but other solid lesions cannot be excluded.  Suggest further assessment with contrast MR     MRI 2023 noted Multiple scattered nonspecific areas of arterial enhancement, for example involving the right hepatic lobe measuring approximately 1.1 cm (axial series 12, image 41), and involving the left hepatic lobe measuring approximately 0.9 cm (axial series 12, image 39).  No associated washout or pseudo capsule.     AFP WNL 2023    Any concerning features? Surveillance recs?     Imaging to be reviewed: US 2023, MRI 2023    HCC Treatment History: none    Platelets:   Lab Results   Component Value Date/Time     2023 09:05 AM     Creatinine:   Lab Results   Component Value Date/Time    CREATININE 0.9 2023 09:05 AM     Bilirubin:   Lab Results   Component Value Date/Time    BILITOT 0.7 2023 09:05 AM     AFP Last 3 each if available:   Lab Results   Component Value Date/Time    AFP 8.4 2023 09:05 AM    AFP 7.4 2023 10:44 AM    AFP 8.4 10/06/2022 10:33 AM       MELD: MELD 3.0: 8 at 2023  9:05 AM  MELD-Na: 6 at 2023  9:05 AM  Calculated from:  Serum Creatinine: 0.9 mg/dL (Using min of 1 mg/dL) at 2023  9:05 AM  Serum Sodium: 136 mmol/L at 2023  9:05 AM  Total Bilirubin: 0.7 mg/dL (Using min of 1 mg/dL) at 2023  9:05 AM  Serum Albumin: 3.5 g/dL at 2023  9:05 AM  INR(ratio): 1.0 at  11/16/2023  9:05 AM  Age at listing (hypothetical): 58 years  Sex: Female at 11/16/2023  9:05 AM      Plan:     Follow-up Provider:  FLAKITO Hilliard NP

## 2023-12-14 ENCOUNTER — LAB VISIT (OUTPATIENT)
Dept: LAB | Facility: HOSPITAL | Age: 58
End: 2023-12-14
Attending: HOSPITALIST
Payer: MEDICAID

## 2023-12-14 DIAGNOSIS — Z00.00 ANNUAL PHYSICAL EXAM: ICD-10-CM

## 2023-12-14 DIAGNOSIS — E55.9 VITAMIN D DEFICIENCY: ICD-10-CM

## 2023-12-14 DIAGNOSIS — E11.9 TYPE 2 DIABETES MELLITUS WITHOUT COMPLICATION, WITHOUT LONG-TERM CURRENT USE OF INSULIN: ICD-10-CM

## 2023-12-14 LAB
25(OH)D3+25(OH)D2 SERPL-MCNC: 21 NG/ML (ref 30–96)
ALBUMIN SERPL BCP-MCNC: 3.5 G/DL (ref 3.5–5.2)
ALP SERPL-CCNC: 171 U/L (ref 55–135)
ALT SERPL W/O P-5'-P-CCNC: 29 U/L (ref 10–44)
ANION GAP SERPL CALC-SCNC: 9 MMOL/L (ref 8–16)
AST SERPL-CCNC: 31 U/L (ref 10–40)
BASOPHILS # BLD AUTO: 0.06 K/UL (ref 0–0.2)
BASOPHILS NFR BLD: 0.9 % (ref 0–1.9)
BILIRUB SERPL-MCNC: 0.5 MG/DL (ref 0.1–1)
BUN SERPL-MCNC: 12 MG/DL (ref 6–20)
CALCIUM SERPL-MCNC: 9.7 MG/DL (ref 8.7–10.5)
CHLORIDE SERPL-SCNC: 109 MMOL/L (ref 95–110)
CHOLEST SERPL-MCNC: 175 MG/DL (ref 120–199)
CHOLEST/HDLC SERPL: 3.7 {RATIO} (ref 2–5)
CO2 SERPL-SCNC: 23 MMOL/L (ref 23–29)
CREAT SERPL-MCNC: 0.8 MG/DL (ref 0.5–1.4)
DIFFERENTIAL METHOD: ABNORMAL
EOSINOPHIL # BLD AUTO: 0.2 K/UL (ref 0–0.5)
EOSINOPHIL NFR BLD: 3.7 % (ref 0–8)
ERYTHROCYTE [DISTWIDTH] IN BLOOD BY AUTOMATED COUNT: 14.1 % (ref 11.5–14.5)
EST. GFR  (NO RACE VARIABLE): >60 ML/MIN/1.73 M^2
ESTIMATED AVG GLUCOSE: 111 MG/DL (ref 68–131)
GLUCOSE SERPL-MCNC: 73 MG/DL (ref 70–110)
HBA1C MFR BLD: 5.5 % (ref 4–5.6)
HCT VFR BLD AUTO: 43 % (ref 37–48.5)
HDLC SERPL-MCNC: 47 MG/DL (ref 40–75)
HDLC SERPL: 26.9 % (ref 20–50)
HGB BLD-MCNC: 13.6 G/DL (ref 12–16)
IMM GRANULOCYTES # BLD AUTO: 0.02 K/UL (ref 0–0.04)
IMM GRANULOCYTES NFR BLD AUTO: 0.3 % (ref 0–0.5)
LDLC SERPL CALC-MCNC: 99.6 MG/DL (ref 63–159)
LYMPHOCYTES # BLD AUTO: 1.6 K/UL (ref 1–4.8)
LYMPHOCYTES NFR BLD: 24.6 % (ref 18–48)
MCH RBC QN AUTO: 30.7 PG (ref 27–31)
MCHC RBC AUTO-ENTMCNC: 31.6 G/DL (ref 32–36)
MCV RBC AUTO: 97 FL (ref 82–98)
MONOCYTES # BLD AUTO: 0.5 K/UL (ref 0.3–1)
MONOCYTES NFR BLD: 7.4 % (ref 4–15)
NEUTROPHILS # BLD AUTO: 4.1 K/UL (ref 1.8–7.7)
NEUTROPHILS NFR BLD: 63.1 % (ref 38–73)
NONHDLC SERPL-MCNC: 128 MG/DL
NRBC BLD-RTO: 0 /100 WBC
PLATELET # BLD AUTO: 183 K/UL (ref 150–450)
PMV BLD AUTO: 12.3 FL (ref 9.2–12.9)
POTASSIUM SERPL-SCNC: 4.2 MMOL/L (ref 3.5–5.1)
PROT SERPL-MCNC: 8.5 G/DL (ref 6–8.4)
RBC # BLD AUTO: 4.43 M/UL (ref 4–5.4)
SODIUM SERPL-SCNC: 141 MMOL/L (ref 136–145)
T4 FREE SERPL-MCNC: 0.91 NG/DL (ref 0.71–1.51)
TRIGL SERPL-MCNC: 142 MG/DL (ref 30–150)
TSH SERPL DL<=0.005 MIU/L-ACNC: 4.11 UIU/ML (ref 0.4–4)
WBC # BLD AUTO: 6.47 K/UL (ref 3.9–12.7)

## 2023-12-14 PROCEDURE — 85025 COMPLETE CBC W/AUTO DIFF WBC: CPT | Performed by: HOSPITALIST

## 2023-12-14 PROCEDURE — 83036 HEMOGLOBIN GLYCOSYLATED A1C: CPT | Performed by: HOSPITALIST

## 2023-12-14 PROCEDURE — 82306 VITAMIN D 25 HYDROXY: CPT | Performed by: HOSPITALIST

## 2023-12-14 PROCEDURE — 80053 COMPREHEN METABOLIC PANEL: CPT | Performed by: HOSPITALIST

## 2023-12-14 PROCEDURE — 84439 ASSAY OF FREE THYROXINE: CPT | Performed by: HOSPITALIST

## 2023-12-14 PROCEDURE — 80061 LIPID PANEL: CPT | Performed by: HOSPITALIST

## 2023-12-14 PROCEDURE — 36415 COLL VENOUS BLD VENIPUNCTURE: CPT | Mod: PO | Performed by: HOSPITALIST

## 2023-12-14 PROCEDURE — 84443 ASSAY THYROID STIM HORMONE: CPT | Performed by: HOSPITALIST

## 2023-12-19 ENCOUNTER — OFFICE VISIT (OUTPATIENT)
Dept: HEPATOLOGY | Facility: CLINIC | Age: 58
End: 2023-12-19
Payer: MEDICAID

## 2023-12-19 ENCOUNTER — CONFERENCE (OUTPATIENT)
Dept: TRANSPLANT | Facility: CLINIC | Age: 58
End: 2023-12-19
Payer: MEDICAID

## 2023-12-19 VITALS — BODY MASS INDEX: 31.01 KG/M2 | WEIGHT: 198 LBS

## 2023-12-19 DIAGNOSIS — E11.59 HYPERTENSION ASSOCIATED WITH TYPE 2 DIABETES MELLITUS: ICD-10-CM

## 2023-12-19 DIAGNOSIS — K76.9 LIVER LESION: Primary | ICD-10-CM

## 2023-12-19 DIAGNOSIS — E78.5 HYPERLIPIDEMIA ASSOCIATED WITH TYPE 2 DIABETES MELLITUS: ICD-10-CM

## 2023-12-19 DIAGNOSIS — I15.2 HYPERTENSION ASSOCIATED WITH TYPE 2 DIABETES MELLITUS: ICD-10-CM

## 2023-12-19 DIAGNOSIS — K76.6 PORTAL HYPERTENSION: ICD-10-CM

## 2023-12-19 DIAGNOSIS — E11.9 TYPE 2 DIABETES MELLITUS WITHOUT COMPLICATION, WITHOUT LONG-TERM CURRENT USE OF INSULIN: ICD-10-CM

## 2023-12-19 DIAGNOSIS — I85.10 SECONDARY ESOPHAGEAL VARICES WITHOUT BLEEDING: ICD-10-CM

## 2023-12-19 DIAGNOSIS — K76.9 LIVER DISEASE, UNSPECIFIED: ICD-10-CM

## 2023-12-19 DIAGNOSIS — E66.9 OBESITY (BMI 30.0-34.9): ICD-10-CM

## 2023-12-19 DIAGNOSIS — K75.81 LIVER CIRRHOSIS SECONDARY TO NASH: ICD-10-CM

## 2023-12-19 DIAGNOSIS — K74.60 LIVER CIRRHOSIS SECONDARY TO NASH: ICD-10-CM

## 2023-12-19 DIAGNOSIS — E11.69 HYPERLIPIDEMIA ASSOCIATED WITH TYPE 2 DIABETES MELLITUS: ICD-10-CM

## 2023-12-19 DIAGNOSIS — R74.8 ELEVATED ALKALINE PHOSPHATASE LEVEL: ICD-10-CM

## 2023-12-19 PROCEDURE — 3066F NEPHROPATHY DOC TX: CPT | Mod: CPTII,,, | Performed by: NURSE PRACTITIONER

## 2023-12-19 PROCEDURE — 99999 PR PBB SHADOW E&M-EST. PATIENT-LVL III: ICD-10-PCS | Mod: PBBFAC,,, | Performed by: NURSE PRACTITIONER

## 2023-12-19 PROCEDURE — 99999 PR PBB SHADOW E&M-EST. PATIENT-LVL III: CPT | Mod: PBBFAC,,, | Performed by: NURSE PRACTITIONER

## 2023-12-19 PROCEDURE — 3044F HG A1C LEVEL LT 7.0%: CPT | Mod: CPTII,,, | Performed by: NURSE PRACTITIONER

## 2023-12-19 PROCEDURE — 1159F MED LIST DOCD IN RCRD: CPT | Mod: CPTII,,, | Performed by: NURSE PRACTITIONER

## 2023-12-19 PROCEDURE — 1160F PR REVIEW ALL MEDS BY PRESCRIBER/CLIN PHARMACIST DOCUMENTED: ICD-10-PCS | Mod: CPTII,,, | Performed by: NURSE PRACTITIONER

## 2023-12-19 PROCEDURE — 3061F NEG MICROALBUMINURIA REV: CPT | Mod: CPTII,,, | Performed by: NURSE PRACTITIONER

## 2023-12-19 PROCEDURE — 3066F PR DOCUMENTATION OF TREATMENT FOR NEPHROPATHY: ICD-10-PCS | Mod: CPTII,,, | Performed by: NURSE PRACTITIONER

## 2023-12-19 PROCEDURE — 3061F PR NEG MICROALBUMINURIA RESULT DOCUMENTED/REVIEW: ICD-10-PCS | Mod: CPTII,,, | Performed by: NURSE PRACTITIONER

## 2023-12-19 PROCEDURE — 3008F BODY MASS INDEX DOCD: CPT | Mod: CPTII,,, | Performed by: NURSE PRACTITIONER

## 2023-12-19 PROCEDURE — 1160F RVW MEDS BY RX/DR IN RCRD: CPT | Mod: CPTII,,, | Performed by: NURSE PRACTITIONER

## 2023-12-19 PROCEDURE — 1159F PR MEDICATION LIST DOCUMENTED IN MEDICAL RECORD: ICD-10-PCS | Mod: CPTII,,, | Performed by: NURSE PRACTITIONER

## 2023-12-19 PROCEDURE — 4010F PR ACE/ARB THEARPY RXD/TAKEN: ICD-10-PCS | Mod: CPTII,,, | Performed by: NURSE PRACTITIONER

## 2023-12-19 PROCEDURE — 4010F ACE/ARB THERAPY RXD/TAKEN: CPT | Mod: CPTII,,, | Performed by: NURSE PRACTITIONER

## 2023-12-19 PROCEDURE — 3008F PR BODY MASS INDEX (BMI) DOCUMENTED: ICD-10-PCS | Mod: CPTII,,, | Performed by: NURSE PRACTITIONER

## 2023-12-19 PROCEDURE — 99213 OFFICE O/P EST LOW 20 MIN: CPT | Mod: PBBFAC | Performed by: NURSE PRACTITIONER

## 2023-12-19 PROCEDURE — 3044F PR MOST RECENT HEMOGLOBIN A1C LEVEL <7.0%: ICD-10-PCS | Mod: CPTII,,, | Performed by: NURSE PRACTITIONER

## 2023-12-19 PROCEDURE — 99214 OFFICE O/P EST MOD 30 MIN: CPT | Mod: S$PBB,,, | Performed by: NURSE PRACTITIONER

## 2023-12-19 PROCEDURE — 99214 PR OFFICE/OUTPT VISIT, EST, LEVL IV, 30-39 MIN: ICD-10-PCS | Mod: S$PBB,,, | Performed by: NURSE PRACTITIONER

## 2023-12-19 RX ORDER — APREMILAST 30 MG/1
1 TABLET, FILM COATED ORAL 2 TIMES DAILY
COMMUNITY
Start: 2023-11-29

## 2023-12-19 NOTE — TELEPHONE ENCOUNTER
Patient: Parris Guevara       MRN: [id number]      : 1965     Age: 58 y.o.  [address] [facility] [address]    Providers: Adeola Hilliard NP    Priority of review: Other    Patient Transplant Status: Hepatology    Reason for presentation: Indeterminate lesion    Clinical Summary: 58 year old female with well compensated cirrhosis due to fatty liver versus MTX    Off MTX since     US 2023 noted 1.3 cm solid hypoechoic mass in the right l lobe of liver,  new since previous examination.  Differential diagnosis includes regenerating nodule but other solid lesions cannot be excluded.  Suggest further assessment with contrast MR     MRI 2023 noted Multiple scattered nonspecific areas of arterial enhancement, for example involving the right hepatic lobe measuring approximately 1.1 cm (axial series 12, image 41), and involving the left hepatic lobe measuring approximately 0.9 cm (axial series 12, image 39).  No associated washout or pseudo capsule.     AFP WNL 2023    Any concerning features? Surveillance recs?     Imaging to be reviewed: US 2023, MRI 2023    HCC Treatment History: none    Platelets:   Lab Results   Component Value Date/Time     2023 08:01 AM     Creatinine:   Lab Results   Component Value Date/Time    CREATININE 0.8 2023 08:01 AM     Bilirubin:   Lab Results   Component Value Date/Time    BILITOT 0.5 2023 08:01 AM     AFP Last 3 each if available:   Lab Results   Component Value Date/Time    AFP 8.4 2023 09:05 AM    AFP 7.4 2023 10:44 AM    AFP 8.4 10/06/2022 10:33 AM       MELD: MELD 3.0: 8 at 2023  9:05 AM  MELD-Na: 6 at 2023  9:05 AM  Calculated from:  Serum Creatinine: 0.9 mg/dL (Using min of 1 mg/dL) at 2023  9:05 AM  Serum Sodium: 136 mmol/L at 2023  9:05 AM  Total Bilirubin: 0.7 mg/dL (Using min of 1 mg/dL) at 2023  9:05 AM  Serum Albumin: 3.5 g/dL at 2023  9:05 AM  INR(ratio): 1.0 at  11/16/2023  9:05 AM  Age at listing (hypothetical): 58 years  Sex: Female at 11/16/2023  9:05 AM    Discussion/Plan from CHRISTIANO Riojas: 1.1cm arterial enhancing lesion in seg 5/6 without evidence of PV washout, possibly relating to a degenerative nodule.  Consider repeat MRI in 6 months.     Committee Discussion:  1.1 cm lesion with arterial enhancement that needs to be followed.    Plan:  Continue MRI in 3-6 months.      Follow-up Provider:  FLAKITO Hilliard NP

## 2023-12-19 NOTE — PATIENT INSTRUCTIONS
This is a web site that you may find helpful about cirrhosis : [email or web address].[email or web address]    Because you have cirrhosis, it is important to attend clinic visits every 6 months with an Ultrasound and blood tests every 6 months to screen for liver cancer (you are at risk of developing liver cancer due to scar tissue in the liver)    Signs and symptoms of worsening liver disease include jaundice, fluid in the belly (ascites), and confusion/disorientation/slowed thought processes due to hepatic encephalopathy (toxins building up because of liver problems).   You should seek medical attention if any of these things occur.    Also, possible bleeding from esophageal varices (blood vessels in the stomach and foodpipe can burst and cause fatal bleeding).  Therefore, if you have symptoms of vomiting blood, blood in your stool, dark or black stools or vomiting coffee ground vomit, YOU SHOULD GO TO THE EMERGENCY ROOM IMMEDIATELY.     Cirrhosis can increase the risk of liver cancer, liver failure, and death. However, we will watch your liver function score (MELD score) closely with each clinic visit. A normal MELD score is 6, highest is 40. Your last one was an 8. We will check this with every clinic visit. A MELD 15 or higher is when we start to consider transplant because MELD 15 or higher indicates that the liver is not functioning as well     Cirrhosis Counseling  - NO alcohol use (includes beer, wine, and/or liquor)  - avoid non-steroidal anti-inflammatory drugs (NSAIDs) such as ibuprofen, Motrin, naprosyn, Alleve due to the risk of kidney damage  - can take acetaminophen (Tylenol), no more than 2000 mg per day  - low sodium (salt) 2 gram per day diet  - high protein diet: 110 grams per day to prevent muscle mass loss. Drink at least 1 protein shake daily (Premier Protein is best option because it is very high protein and low sugar). Ok to use this as nighttime snack to fit it in   - resistance exercises for muscle  strength  - avoid raw seafoods due to the risk of fatal Vibrio vulnificus infection  - ultrasound of the liver every 6 months for liver cancer screening (you are at risk of developing liver cancer due to scar tissue in the liver)  - Upper endoscopy every 1-2 years to screen for varices in the stomach and foodpipe which can burst and cause fatal bleeding

## 2023-12-19 NOTE — PROGRESS NOTES
[facility] Established Patient Visit    Reason for Visit:  cirrhosis      PCP: Reyna Duval    HPI:  This is a 58 y.o. female with PMH noted below, here for follow up of Well-compensated cirrhosis likely due to fatty liver versus MTX   Was on MTX for years, stopped ~9/2022    RA and PSA followed by rheumatology at [place]    Diagnosis of cirrhosis based on  liver biopsy  11/2022. No PBC on biopsy     Serological workup was negative for Levar's, alpha-1 antitrypsin deficiency, hemochromatosis, and viral hepatitis B and C    Prior serologic workup:   Lab Results   Component Value Date    FERRITIN 122 11/23/2022    FESATURATED 19 (L) 11/23/2022    CERULOPLSM 27.0 11/23/2022    HEPBSAG Non-reactive 11/23/2022    HEPCAB Non-reactive 11/23/2022     Risk factors for NAFLD include obesity, HTN, HLD, DM    Interval HPI: Presents today alone. No s/s of hepatic decompensation: no ascites, HE or h/o variceal bleeding.   Does report some possible memory loss, disorientation at times  On Otezla per outside rheum   Liver lesion on MRI discussed at IR conference, recommends continued surveillance      Abd US done 12/2023 showed nodular contour liver, no lesions, + splenomegaly    Lab Results   Component Value Date    ALT 29 12/14/2023    AST 31 12/14/2023    ALKPHOS 171 (H) 12/14/2023    BILITOT 0.5 12/14/2023    ALBUMIN 3.5 12/14/2023    INR 1.0 11/16/2023     12/14/2023     MELD 3.0: 8 at 11/16/2023  9:05 AM  MELD-Na: 6 at 11/16/2023  9:05 AM  Calculated from:  Serum Creatinine: 0.9 mg/dL (Using min of 1 mg/dL) at 11/16/2023  9:05 AM  Serum Sodium: 136 mmol/L at 11/16/2023  9:05 AM  Total Bilirubin: 0.7 mg/dL (Using min of 1 mg/dL) at 11/16/2023  9:05 AM  Serum Albumin: 3.5 g/dL at 11/16/2023  9:05 AM  INR(ratio): 1.0 at 11/16/2023  9:05 AM  Age at listing (hypothetical): 58 years  Sex: Female at 11/16/2023  9:05 AM  MELD 8    Cirrhosis Health Maintenance:   -- Last  EGD 10/2023 varices, repeat  10/2024  -- Due for next colonoscopy : 2027  -- HCC screening   MRI without concerning lesions currently, recommends repeat  3/2024   AFP  WNL, next due 3/2024  Lab Results   Component Value Date    AFP 8.4 11/16/2023       -- Immunity to Hep A and B - completed [employer]     + family history of liver disease: mother with CARTER cirrhosis. Denies current alcohol consumption  or history of significant alcohol consumption in past   Social History     Substance and Sexual Activity   Alcohol Use Not Currently         PMHX:  has a past medical history of Arthritis, Diabetes mellitus, Diabetes mellitus, type 2, Essential (primary) hypertension, Fatty liver, Hyperlipidemia, Hypertension, Hyperthyroidism, Hypothyroidism, Psoriasis, and Spleen enlarged.    PSHX:  has a past surgical history that includes Thyroidectomy, partial; xi robotic hysterectomy, with salpingo-oophorectomy (Bilateral, 9/12/2022); Robot-assisted laparoscopic lysis of adhesions using da Ema Xi (N/A, 9/12/2022); Esophagogastroduodenoscopy (N/A, 11/16/2022); Colonoscopy (N/A, 11/16/2022); and Esophagogastroduodenoscopy (N/A, 10/17/2023).    The patient's social and family histories were reviewed by me and updated in the appropriate section of the electronic medical record.    Review of patient's allergies indicates:  No Known Allergies      Current Outpatient Medications on File Prior to Visit   Medication Sig Dispense Refill    atorvastatin (LIPITOR) 40 MG tablet Take 40 mg by mouth once daily.      calcium carbonate (OS-EDITA) 500 mg calcium (1,250 mg) tablet Take 2 tablets by mouth once daily.      ergocalciferol (ERGOCALCIFEROL) 50,000 unit Cap Take 50,000 Units by mouth every 7 days.      lactulose (CHRONULAC) 20 gram/30 mL Soln Take 23 mLs (15 g total) by mouth 2 (two) times daily as needed (constipation). 1000 mL 0    levothyroxine (SYNTHROID) 125 MCG tablet TAKE 1 TABLET BY MOUTH EVERY DAY 90 tablet 1    OTEZLA 30 mg Tab Take 1 tablet by mouth 2 (two)  times daily.      pantoprazole (PROTONIX) 40 MG tablet Take 1 tablet (40 mg total) by mouth once daily. 90 tablet 3    predniSONE (DELTASONE) 10 mg tablet pack Take by mouth once daily. Taking 1 half a day      semaglutide (OZEMPIC) 0.25 mg or 0.5 mg (2 mg/3 mL) pen injector Inject 0.5 mg into the skin every 7 days. 3 mL 5    triamcinolone acetonide 0.1% (KENALOG) 0.1 % cream Apply topically 2 (two) times daily. Apply to affected area. Do not apply to face, groin or underarm 80 g 1    valsartan (DIOVAN) 320 MG tablet TAKE 1 TABLET BY MOUTH ONCE DAILY. 90 tablet 2    [DISCONTINUED] folic acid (FOLVITE) 1 MG tablet Take 1,000 mcg by mouth once daily.       No current facility-administered medications on file prior to visit.       SOCIAL HISTORY:   Social History     Substance and Sexual Activity   Alcohol Use Not Currently       Social History     Substance and Sexual Activity   Drug Use Not Currently       ROS:   GENERAL: Denies fatigue  CARDIOVASCULAR: Denies edema  GI: Denies abdominal pain  SKIN: Denies rash, itching   NEURO: Denies confusion, memory loss, or mood changes    Objective Findings:    PHYSICAL EXAM:   Friendly Black or  female, in no acute distress; alert and oriented to person, place and time  VITALS: Wt 89.8 kg (197 lb 15.6 oz)   BMI 31.01 kg/m²   EYES: Sclerae anicteric  GI: Soft, non-tender, non-distended. No ascites.  EXTREMITIES:  No edema.  SKIN: Warm and dry. No jaundice. No telangectasias noted. No palmar erythema.  NEURO:  No asterixis.  PSYCH:  Thought and speech pattern appropriate. Behavior normal      EDUCATION:  See instructions discussed with patient in Instructions section of the After Visit Summary       ASSESSMENT & PLAN:  58 y.o. Black or  female with:  1.  Cirrhosis, likely due to CARTER versus MTX (?), well compensated  --- cirrhosis diagnosis based on biopsy  -- MELD 3.0: 8 at 11/16/2023  9:05 AM  MELD-Na: 6 at 11/16/2023  9:05 AM  Calculated  from:  Serum Creatinine: 0.9 mg/dL (Using min of 1 mg/dL) at 11/16/2023  9:05 AM  Serum Sodium: 136 mmol/L at 11/16/2023  9:05 AM  Total Bilirubin: 0.7 mg/dL (Using min of 1 mg/dL) at 11/16/2023  9:05 AM  Serum Albumin: 3.5 g/dL at 11/16/2023  9:05 AM  INR(ratio): 1.0 at 11/16/2023  9:05 AM  Age at listing (hypothetical): 58 years  Sex: Female at 11/16/2023  9:05 AM  -- HCC screening: AFP and abd. U/S.. See below  -- Immunity to Hep A and B - see HPI  --- Serological workup: see HPI  -- Cirrhosis counseling as noted above and discussed with patient    2.  Fatty liver  -- risk factors for fatty liver: obesity, HTN, HLD, DM  Recommend:  1. Weight loss goal of 20 lbs  2. Low carb/sugar, high fiber and protein diet  3. Exercise, 5 days per week, 30 minutes per day, as tolerated  4. Recommend good cholesterol, blood pressure, blood sugar levels     3. Portal hypertension, varices  -- EGD next due 10/2024  -- No Ascites or ankle edema   -- + Splenomegaly    4. Liver lesion  -- MRI 12/2023 noted Multiple scattered nonspecific areas of arterial enhancement involving the liver as detailed above, without evidence for washout, pseudo capsule, or other concerning imaging features.  Lesions remain indeterminate, and attention on follow-up imaging is recommended.   AFP WNL  Discussed in IR conference with rec for MRI in 3 months     5. Memory loss  Ammonia with next labs    Follow up in about 3 months (around 3/19/2024). with MRI and labs a few days before  Orders Placed This Encounter   Procedures    MRI Abdomen W WO Contrast    AFP Tumor Marker    Comprehensive Metabolic Panel    CBC Without Differential    Protime-INR    AMMONIA        Thank you for allowing me to participate in the care of Parris ARABELLA De Dios    I spent a total of 30 minutes on the day of the visit.This includes face to face time and non-face to face time preparing to see the patient (eg, review of tests), obtaining and/or reviewing  separately obtained history, documenting clinical information in the electronic or other health record, independently interpreting results and communicating results to the patient/family/caregiver, and coordinating care.       CC'ed note to:

## 2023-12-21 ENCOUNTER — OFFICE VISIT (OUTPATIENT)
Dept: INTERNAL MEDICINE | Facility: CLINIC | Age: 58
End: 2023-12-21
Payer: MEDICAID

## 2023-12-21 VITALS
RESPIRATION RATE: 18 BRPM | HEART RATE: 94 BPM | DIASTOLIC BLOOD PRESSURE: 88 MMHG | HEIGHT: 67 IN | SYSTOLIC BLOOD PRESSURE: 124 MMHG | TEMPERATURE: 98 F | BODY MASS INDEX: 30.17 KG/M2 | WEIGHT: 192.25 LBS | OXYGEN SATURATION: 98 %

## 2023-12-21 DIAGNOSIS — R68.89 FORGETFULNESS: ICD-10-CM

## 2023-12-21 DIAGNOSIS — I15.2 HYPERTENSION ASSOCIATED WITH TYPE 2 DIABETES MELLITUS: ICD-10-CM

## 2023-12-21 DIAGNOSIS — E11.69 HYPERLIPIDEMIA ASSOCIATED WITH TYPE 2 DIABETES MELLITUS: ICD-10-CM

## 2023-12-21 DIAGNOSIS — Z00.00 ANNUAL PHYSICAL EXAM: Primary | ICD-10-CM

## 2023-12-21 DIAGNOSIS — E11.9 TYPE 2 DIABETES MELLITUS WITHOUT COMPLICATION, WITHOUT LONG-TERM CURRENT USE OF INSULIN: ICD-10-CM

## 2023-12-21 DIAGNOSIS — K75.81 LIVER CIRRHOSIS SECONDARY TO NASH: ICD-10-CM

## 2023-12-21 DIAGNOSIS — L40.50 PSORIATIC ARTHRITIS: ICD-10-CM

## 2023-12-21 DIAGNOSIS — E55.9 VITAMIN D DEFICIENCY: ICD-10-CM

## 2023-12-21 DIAGNOSIS — R19.7 DIARRHEA, UNSPECIFIED TYPE: ICD-10-CM

## 2023-12-21 DIAGNOSIS — E89.0 POSTOPERATIVE HYPOTHYROIDISM: ICD-10-CM

## 2023-12-21 DIAGNOSIS — E78.5 HYPERLIPIDEMIA ASSOCIATED WITH TYPE 2 DIABETES MELLITUS: ICD-10-CM

## 2023-12-21 DIAGNOSIS — E11.59 HYPERTENSION ASSOCIATED WITH TYPE 2 DIABETES MELLITUS: ICD-10-CM

## 2023-12-21 DIAGNOSIS — Z23 NEED FOR VACCINATION: ICD-10-CM

## 2023-12-21 DIAGNOSIS — Z79.899 LONG-TERM CURRENT USE OF PROTON PUMP INHIBITOR THERAPY: ICD-10-CM

## 2023-12-21 DIAGNOSIS — K74.60 LIVER CIRRHOSIS SECONDARY TO NASH: ICD-10-CM

## 2023-12-21 PROCEDURE — 3079F DIAST BP 80-89 MM HG: CPT | Mod: CPTII,,, | Performed by: HOSPITALIST

## 2023-12-21 PROCEDURE — 99215 OFFICE O/P EST HI 40 MIN: CPT | Mod: PBBFAC,PO | Performed by: HOSPITALIST

## 2023-12-21 PROCEDURE — 3044F HG A1C LEVEL LT 7.0%: CPT | Mod: CPTII,,, | Performed by: HOSPITALIST

## 2023-12-21 PROCEDURE — 4010F ACE/ARB THERAPY RXD/TAKEN: CPT | Mod: CPTII,,, | Performed by: HOSPITALIST

## 2023-12-21 PROCEDURE — 3008F BODY MASS INDEX DOCD: CPT | Mod: CPTII,,, | Performed by: HOSPITALIST

## 2023-12-21 PROCEDURE — 99999PBSHW FLU VACCINE (QUAD) GREATER THAN OR EQUAL TO 3YO PRESERVATIVE FREE IM: Mod: PBBFAC,,,

## 2023-12-21 PROCEDURE — 3072F LOW RISK FOR RETINOPATHY: CPT | Mod: CPTII,,, | Performed by: HOSPITALIST

## 2023-12-21 PROCEDURE — 90686 IIV4 VACC NO PRSV 0.5 ML IM: CPT | Mod: PBBFAC,PO

## 2023-12-21 PROCEDURE — 99999 PR PBB SHADOW E&M-EST. PATIENT-LVL V: CPT | Mod: PBBFAC,,, | Performed by: HOSPITALIST

## 2023-12-21 PROCEDURE — 99396 PREV VISIT EST AGE 40-64: CPT | Mod: S$PBB,,, | Performed by: HOSPITALIST

## 2023-12-21 PROCEDURE — 3066F NEPHROPATHY DOC TX: CPT | Mod: CPTII,,, | Performed by: HOSPITALIST

## 2023-12-21 PROCEDURE — 3061F NEG MICROALBUMINURIA REV: CPT | Mod: CPTII,,, | Performed by: HOSPITALIST

## 2023-12-21 PROCEDURE — 3074F SYST BP LT 130 MM HG: CPT | Mod: CPTII,,, | Performed by: HOSPITALIST

## 2023-12-21 RX ORDER — TRAMADOL HYDROCHLORIDE 50 MG/1
50 TABLET ORAL EVERY 12 HOURS PRN
Qty: 60 TABLET | Refills: 0 | Status: SHIPPED | OUTPATIENT
Start: 2023-12-21

## 2023-12-21 RX ORDER — ERGOCALCIFEROL 1.25 MG/1
50000 CAPSULE ORAL
Qty: 12 CAPSULE | Refills: 3 | Status: CANCELLED | OUTPATIENT
Start: 2023-12-21

## 2023-12-21 NOTE — PROGRESS NOTES
Subjective:     @Patient ID: Parris Guevara is a 58 y.o. female.    Chief Complaint: Follow-up (6 mo)    HPI  56 yo F with HTN, DM2, HLD, psoriatic arthritis, Grave's disease hypothyroidism s/p partial thyroidectomy at age 9 yo, presents for:         1. DM2: last a1c 5.8; On metformin  mg bid  . Foot exam: due  Eye exam: due. She stopped ozempic 2/2 to nausea/vomiting and diarrhea. Reports she would like to restart it. States was able to tolerate it awhile ago.   2. HTN: now on valsartan 320 mg qday. No longer on losartan or amlodipine.  Reports BP usually 130/80s at home   3. HLD: atorvastatin 40 mg qday  4. Ovarian mass: diagnosed on CT scan in ER. Also had ultrasound. Seen on  with GYN. Underwent s/p hysterectomy and BSO. Path showed ROSALBA I otherwise no malignancy  5. 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  Pt reports her mother  of CARTER cirrhosis, liver cancer. She is now following with Hepatology clinic   5. Psoriatic arthritis and PMR: on folic acid 1 mg qday  Follows with rheumatology at [facility] Dr Thao Haynes.  no longer on MTX as possibly contributed to liver cirrhosis. Also recently dx with PMR. Reports on prednisone; symptoms of leg weakness/pain have improved but still has symptoms of L upper leg.   6. Hypothyroidism s/p partial thyroidectomy: on synthroid 137 mcg qday  7. Obesity: pt limited with activity with hx of arthritis   8. Gallstones:CT scan on 7/15 showed small calcified gallstones   9. Dysphagia: reports lately having trouble with foods; has scheduled barium enema at [facility]. Did have EGD 2022   10. Palpitations: reports intermittent palpitations over the years; reports EKGs in the past were normal. Last occurred over 1-2 weeks ago. Was sitting on couch when it happened   11. Constipation: has tried dulcolax, mag citrate and miralax. Also tried stool softener     Review of Systems   Constitutional:  Negative for chills and fever.     Past medical history, surgical  "history, and family medical history reviewed and updated as appropriate.    Medications and allergies reviewed.     Objective:     Vitals:    12/21/23 0959   BP: 124/88   BP Location: Right arm   Patient Position: Sitting   BP Method: Large (Manual)   Pulse: 94   Resp: 18   Temp: 98.1 °F (36.7 °C)   TempSrc: Temporal   SpO2: 98%   Weight: 87.2 kg (192 lb 3.9 oz)   Height: 5' 7" (1.702 m)     Body mass index is 30.11 kg/m².  Physical Exam  Vitals reviewed.   Constitutional:       General: She is not in acute distress.     Appearance: Normal appearance. She is well-developed.   HENT:      Head: Normocephalic and atraumatic.      Right Ear: Tympanic membrane normal.      Left Ear: Tympanic membrane normal.      Mouth/Throat:      Mouth: Mucous membranes are moist.      Pharynx: No oropharyngeal exudate.   Eyes:      General:         Right eye: No discharge.         Left eye: No discharge.      Conjunctiva/sclera: Conjunctivae normal.   Cardiovascular:      Rate and Rhythm: Normal rate and regular rhythm.      Heart sounds: No murmur heard.     No friction rub.   Pulmonary:      Effort: Pulmonary effort is normal.      Breath sounds: Normal breath sounds.   Abdominal:      General: Bowel sounds are normal. There is no distension.      Palpations: Abdomen is soft.      Tenderness: There is no abdominal tenderness. There is no guarding.   Musculoskeletal:         General: Normal range of motion.      Cervical back: Normal range of motion and neck supple.      Right lower leg: No edema.      Left lower leg: No edema.   Lymphadenopathy:      Cervical: No cervical adenopathy.   Skin:     General: Skin is warm and dry.   Neurological:      Mental Status: She is alert and oriented to person, place, and time.   Psychiatric:         Mood and Affect: Mood normal.         Behavior: Behavior normal.         Lab Results   Component Value Date    WBC 6.47 12/14/2023    HGB 13.6 12/14/2023    HCT 43.0 12/14/2023     12/14/2023 "    CHOL 175 12/14/2023    TRIG 142 12/14/2023    HDL 47 12/14/2023    ALT 29 12/14/2023    AST 31 12/14/2023     12/14/2023    K 4.2 12/14/2023     12/14/2023    CREATININE 0.8 12/14/2023    BUN 12 12/14/2023    CO2 23 12/14/2023    TSH 4.109 (H) 12/14/2023    INR 1.0 11/16/2023    HGBA1C 5.5 12/14/2023       Assessment:     1. Annual physical exam    2. Type 2 diabetes mellitus without complication, without long-term current use of insulin    3. 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 Vitamin D deficiency    5. Long-term current use of proton pump inhibitor therapy    6. Hypertension associated with type 2 diabetes mellitus    7. Hyperlipidemia associated with type 2 diabetes mellitus    8. Forgetfulness    9. Psoriatic arthritis    10. Postoperative hypothyroidism    11. Need for vaccination    12. Diarrhea, unspecified type      Plan:   Tia was seen today for follow-up.    Diagnoses and all orders for this visit:    Annual physical exam    Type 2 diabetes mellitus without complication, without long-term current use of insulin  -     VITAMIN B12; Future  -     Hemoglobin A1C; Future  -     Comprehensive Metabolic Panel; Future  -     Lipid Panel; Future  -     Microalbumin/Creatinine Ratio, Urine; Future  -     CBC W/ AUTO DIFFERENTIAL; Future  -     AMMONIA; Future  -     Vitamin D; Future  -     PROTIME-INR; Future  -     TSH; Future    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  -     VITAMIN B12; Future  -     Hemoglobin A1C; Future  -     Comprehensive Metabolic Panel; Future  -     Lipid Panel; Future  -     Microalbumin/Creatinine Ratio, Urine; Future  -     CBC W/ AUTO DIFFERENTIAL; Future  -     AMMONIA; Future  -     Vitamin D; Future  -     PROTIME-INR; Future  -     TSH; Future    Vitamin D deficiency  -     VITAMIN B12; Future  -     Hemoglobin A1C; Future  -     Comprehensive Metabolic Panel; Future  -     Lipid Panel; Future  -     Microalbumin/Creatinine Ratio, Urine; Future  -     CBC W/ AUTO  DIFFERENTIAL; Future  -     AMMONIA; Future  -     Vitamin D; Future  -     PROTIME-INR; Future  -     TSH; Future    Long-term current use of proton pump inhibitor therapy  -     VITAMIN B12; Future  -     Hemoglobin A1C; Future  -     Comprehensive Metabolic Panel; Future  -     Lipid Panel; Future  -     Microalbumin/Creatinine Ratio, Urine; Future  -     CBC W/ AUTO DIFFERENTIAL; Future  -     AMMONIA; Future  -     Vitamin D; Future  -     PROTIME-INR; Future  -     TSH; Future    Hypertension associated with type 2 diabetes mellitus  -     VITAMIN B12; Future  -     Hemoglobin A1C; Future  -     Comprehensive Metabolic Panel; Future  -     Lipid Panel; Future  -     Microalbumin/Creatinine Ratio, Urine; Future  -     CBC W/ AUTO DIFFERENTIAL; Future  -     AMMONIA; Future  -     Vitamin D; Future  -     PROTIME-INR; Future  -     TSH; Future    Hyperlipidemia associated with type 2 diabetes mellitus  -     VITAMIN B12; Future  -     Hemoglobin A1C; Future  -     Comprehensive Metabolic Panel; Future  -     Lipid Panel; Future  -     Microalbumin/Creatinine Ratio, Urine; Future  -     CBC W/ AUTO DIFFERENTIAL; Future  -     AMMONIA; Future  -     Vitamin D; Future  -     PROTIME-INR; Future  -     TSH; Future    Forgetfulness  -     VITAMIN B12; Future  -     Hemoglobin A1C; Future  -     Comprehensive Metabolic Panel; Future  -     Lipid Panel; Future  -     Microalbumin/Creatinine Ratio, Urine; Future  -     CBC W/ AUTO DIFFERENTIAL; Future  -     AMMONIA; Future  -     Vitamin D; Future  -     PROTIME-INR; Future  -     MRI Brain Without Contrast; Future  -     TSH; Future    Psoriatic arthritis  -     VITAMIN B12; Future  -     Hemoglobin A1C; Future  -     Comprehensive Metabolic Panel; Future  -     Lipid Panel; Future  -     Microalbumin/Creatinine Ratio, Urine; Future  -     CBC W/ AUTO DIFFERENTIAL; Future  -     AMMONIA; Future  -     Vitamin D; Future  -     PROTIME-INR; Future  -     TSH;  Future    Postoperative hypothyroidism  -     VITAMIN B12; Future  -     Hemoglobin A1C; Future  -     Comprehensive Metabolic Panel; Future  -     Lipid Panel; Future  -     Microalbumin/Creatinine Ratio, Urine; Future  -     CBC W/ AUTO DIFFERENTIAL; Future  -     AMMONIA; Future  -     Vitamin D; Future  -     PROTIME-INR; Future  -     TSH; Future    Need for vaccination  -     Influenza - Quadrivalent (PF)    Diarrhea, unspecified type  -     WBC, Stool; Future  -     Stool culture; Future  -     Giardia / Cryptosporidum, EIA; Future  -     Stool Exam-Ova,Cysts,Parasites; Future  -     Rotavirus antigen, stool; Future    Other orders  -     traMADoL (ULTRAM) 50 mg tablet; Take 1 tablet (50 mg total) by mouth every 12 (twelve) hours as needed for Pain.  The following orders have not been finalized:  -     Cancel: ergocalciferol (ERGOCALCIFEROL) 50,000 unit Cap          No follow-ups on file.    Reyna Duval MD  Internal Medicine    12/21/2023

## 2024-01-02 NOTE — TELEPHONE ENCOUNTER
No care due was identified.  Health [facility] Embedded Care Due Messages. Reference number: [id number].   1/02/2024 12:09:07 AM CST

## 2024-01-04 RX ORDER — TRIAMCINOLONE ACETONIDE 1 MG/G
CREAM TOPICAL 2 TIMES DAILY
Qty: 80 G | Refills: 1 | Status: SHIPPED | OUTPATIENT
Start: 2024-01-04

## 2024-01-04 RX ORDER — TRIAMCINOLONE ACETONIDE 1 MG/G
CREAM TOPICAL 2 TIMES DAILY
Qty: 80 G | Refills: 1 | OUTPATIENT
Start: 2024-01-04

## 2024-01-09 ENCOUNTER — TELEPHONE (OUTPATIENT)
Dept: INTERNAL MEDICINE | Facility: CLINIC | Age: 59
End: 2024-01-09
Payer: MEDICAID

## 2024-01-09 DIAGNOSIS — E11.9 TYPE 2 DIABETES MELLITUS WITHOUT COMPLICATION, WITHOUT LONG-TERM CURRENT USE OF INSULIN: ICD-10-CM

## 2024-01-09 DIAGNOSIS — E11.9 TYPE 2 DIABETES MELLITUS WITHOUT COMPLICATION, WITHOUT LONG-TERM CURRENT USE OF INSULIN: Primary | ICD-10-CM

## 2024-01-09 RX ORDER — SEMAGLUTIDE 0.68 MG/ML
0.5 INJECTION, SOLUTION SUBCUTANEOUS
Qty: 3 ML | Refills: 5 | Status: SHIPPED | OUTPATIENT
Start: 2024-01-09

## 2024-01-09 RX ORDER — SEMAGLUTIDE 0.68 MG/ML
0.5 INJECTION, SOLUTION SUBCUTANEOUS
Qty: 3 ML | Refills: 5 | Status: CANCELLED | OUTPATIENT
Start: 2024-01-09

## 2024-01-09 RX ORDER — SEMAGLUTIDE 0.68 MG/ML
0.5 INJECTION, SOLUTION SUBCUTANEOUS
Qty: 3 ML | Refills: 5 | Status: SHIPPED | OUTPATIENT
Start: 2024-01-09 | End: 2024-01-09

## 2024-01-09 NOTE — TELEPHONE ENCOUNTER
Pt called requesting Ozempic  be sent to [facility] on [address] and pharmacy request diagnosis.

## 2024-01-09 NOTE — TELEPHONE ENCOUNTER
No care due was identified.  Health [facility] Embedded Care Due Messages. Reference number: [id number].   1/09/2024 12:55:53 PM CST

## 2024-01-12 ENCOUNTER — HOSPITAL ENCOUNTER (OUTPATIENT)
Dept: RADIOLOGY | Facility: HOSPITAL | Age: 59
Discharge: HOME | End: 2024-01-12
Attending: HOSPITALIST
Payer: MEDICAID

## 2024-01-12 DIAGNOSIS — R68.89 FORGETFULNESS: ICD-10-CM

## 2024-01-12 PROCEDURE — 70551 MRI BRAIN STEM W/O DYE: CPT | Mod: TC

## 2024-01-12 PROCEDURE — 70551 MRI BRAIN STEM W/O DYE: CPT | Mod: 26,,, | Performed by: STUDENT IN AN ORGANIZED HEALTH CARE EDUCATION/TRAINING PROGRAM

## 2024-01-18 DIAGNOSIS — J01.90 ACUTE BACTERIAL SINUSITIS: Primary | ICD-10-CM

## 2024-01-18 DIAGNOSIS — B96.89 ACUTE BACTERIAL SINUSITIS: Primary | ICD-10-CM

## 2024-01-18 RX ORDER — LACTULOSE 10 G/15ML
10 SOLUTION ORAL 2 TIMES DAILY PRN
Qty: 1000 ML | Refills: 5 | Status: SHIPPED | OUTPATIENT
Start: 2024-01-18

## 2024-01-18 RX ORDER — AMOXICILLIN AND CLAVULANATE POTASSIUM 875; 125 MG/1; MG/1
1 TABLET, FILM COATED ORAL EVERY 12 HOURS
Qty: 14 TABLET | Refills: 0 | Status: SHIPPED | OUTPATIENT
Start: 2024-01-18 | End: 2024-06-11

## 2024-01-23 ENCOUNTER — TELEPHONE (OUTPATIENT)
Dept: HEPATOLOGY | Facility: CLINIC | Age: 59
End: 2024-01-23
Payer: MEDICAID

## 2024-01-31 ENCOUNTER — TELEPHONE (OUTPATIENT)
Dept: INTERNAL MEDICINE | Facility: CLINIC | Age: 59
End: 2024-01-31
Payer: MEDICAID

## 2024-01-31 DIAGNOSIS — M25.852 LEFT HIP IMPINGEMENT SYNDROME: ICD-10-CM

## 2024-01-31 DIAGNOSIS — M25.552 LEFT HIP PAIN: Primary | ICD-10-CM

## 2024-01-31 PROBLEM — M25.551 RIGHT HIP PAIN: Status: RESOLVED | Noted: 2024-01-31 | Resolved: 2024-01-31

## 2024-01-31 PROBLEM — M25.551 RIGHT HIP PAIN: Status: ACTIVE | Noted: 2024-01-31

## 2024-01-31 NOTE — TELEPHONE ENCOUNTER
MD notified that pt with L hip. Had xrays at [place].   Needs ortho appt asap. Has decreased mobility due to pain. Limited on pain management options as nsaids cause bleeding and has liver disease so unable to take tylenol

## 2024-02-01 ENCOUNTER — HOSPITAL ENCOUNTER (OUTPATIENT)
Dept: RADIOLOGY | Facility: HOSPITAL | Age: 59
Discharge: HOME | End: 2024-02-01
Attending: HOSPITALIST
Payer: MEDICAID

## 2024-02-01 DIAGNOSIS — Z12.31 OTHER SCREENING MAMMOGRAM: ICD-10-CM

## 2024-02-01 PROCEDURE — 77067 SCR MAMMO BI INCL CAD: CPT | Mod: TC,PO

## 2024-02-01 PROCEDURE — 77067 SCR MAMMO BI INCL CAD: CPT | Mod: 26,,, | Performed by: RADIOLOGY

## 2024-02-01 PROCEDURE — 77063 BREAST TOMOSYNTHESIS BI: CPT | Mod: 26,,, | Performed by: RADIOLOGY

## 2024-03-06 DIAGNOSIS — M25.552 LEFT HIP PAIN: Primary | ICD-10-CM

## 2024-03-06 DIAGNOSIS — M25.551 RIGHT HIP PAIN: ICD-10-CM

## 2024-03-08 ENCOUNTER — TELEPHONE (OUTPATIENT)
Dept: ORTHOPEDICS | Facility: CLINIC | Age: 59
End: 2024-03-08
Payer: MEDICAID

## 2024-03-08 NOTE — TELEPHONE ENCOUNTER
I called pt to reschedule a missed appointment. The pt did not answer. I made the pt the next available appointment. 4/25/2024 @2:00 PM

## 2024-03-28 ENCOUNTER — HOSPITAL ENCOUNTER (OUTPATIENT)
Dept: RADIOLOGY | Facility: HOSPITAL | Age: 59
Discharge: HOME | End: 2024-03-28
Attending: NURSE PRACTITIONER
Payer: MEDICAID

## 2024-03-28 DIAGNOSIS — K74.60 LIVER CIRRHOSIS SECONDARY TO NASH: ICD-10-CM

## 2024-03-28 DIAGNOSIS — K75.81 LIVER CIRRHOSIS SECONDARY TO NASH: ICD-10-CM

## 2024-03-28 DIAGNOSIS — K76.9 LIVER DISEASE, UNSPECIFIED: ICD-10-CM

## 2024-03-28 DIAGNOSIS — K76.9 LIVER LESION: ICD-10-CM

## 2024-03-28 PROCEDURE — 74183 MRI ABD W/O CNTR FLWD CNTR: CPT | Mod: 26,,, | Performed by: STUDENT IN AN ORGANIZED HEALTH CARE EDUCATION/TRAINING PROGRAM

## 2024-03-28 PROCEDURE — A9585 GADOBUTROL INJECTION: HCPCS | Performed by: NURSE PRACTITIONER

## 2024-03-28 PROCEDURE — 25500020 PHARM REV CODE 255: Performed by: NURSE PRACTITIONER

## 2024-03-28 PROCEDURE — 74183 MRI ABD W/O CNTR FLWD CNTR: CPT | Mod: TC

## 2024-03-28 RX ORDER — GADOBUTROL 604.72 MG/ML
10 INJECTION INTRAVENOUS
Status: COMPLETED | OUTPATIENT
Start: 2024-03-28 | End: 2024-03-28

## 2024-03-28 RX ADMIN — GADOBUTROL 10 ML: 604.72 INJECTION INTRAVENOUS at 09:03

## 2024-04-10 RX ORDER — TRIAMCINOLONE ACETONIDE 1 MG/G
CREAM TOPICAL 2 TIMES DAILY
Qty: 80 G | Refills: 1 | Status: SHIPPED | OUTPATIENT
Start: 2024-04-10 | End: 2024-06-06

## 2024-04-10 RX ORDER — ERGOCALCIFEROL 1.25 MG/1
50000 CAPSULE ORAL
Qty: 12 CAPSULE | Refills: 3 | Status: SHIPPED | OUTPATIENT
Start: 2024-04-10

## 2024-04-10 NOTE — TELEPHONE ENCOUNTER
Care Due:                  Date            Visit Type   Department     Provider  --------------------------------------------------------------------------------                                EP -                              PRIMARY      [facility] INTERNAL  Last Visit: 12-      CARE ([facility])   MEDICINE       Reyna  Simin                              EP -                              PRIMARY      [facility] INTERNAL  Next Visit: 06-      CARE ([facility])   [facility]                                                            Last  Test          Frequency    Reason                     Performed    Due Date  --------------------------------------------------------------------------------    HBA1C.......  6 months...  semaglutide..............  12- 06-    Health [facility] Embedded Care Due Messages. Reference number: [id number].   4/10/2024 11:12:42 AM CDT

## 2024-04-11 ENCOUNTER — OFFICE VISIT (OUTPATIENT)
Dept: HEPATOLOGY | Facility: CLINIC | Age: 59
End: 2024-04-11
Payer: MEDICAID

## 2024-04-11 VITALS — BODY MASS INDEX: 32.7 KG/M2 | HEIGHT: 67 IN | WEIGHT: 208.31 LBS

## 2024-04-11 DIAGNOSIS — K74.60 LIVER CIRRHOSIS SECONDARY TO NASH: Primary | ICD-10-CM

## 2024-04-11 DIAGNOSIS — E66.09 CLASS 1 OBESITY DUE TO EXCESS CALORIES WITH SERIOUS COMORBIDITY AND BODY MASS INDEX (BMI) OF 32.0 TO 32.9 IN ADULT: ICD-10-CM

## 2024-04-11 DIAGNOSIS — E11.9 TYPE 2 DIABETES MELLITUS WITHOUT COMPLICATION, WITHOUT LONG-TERM CURRENT USE OF INSULIN: ICD-10-CM

## 2024-04-11 DIAGNOSIS — I15.2 HYPERTENSION ASSOCIATED WITH TYPE 2 DIABETES MELLITUS: ICD-10-CM

## 2024-04-11 DIAGNOSIS — E11.69 HYPERLIPIDEMIA ASSOCIATED WITH TYPE 2 DIABETES MELLITUS: ICD-10-CM

## 2024-04-11 DIAGNOSIS — I85.10 SECONDARY ESOPHAGEAL VARICES WITHOUT BLEEDING: ICD-10-CM

## 2024-04-11 DIAGNOSIS — R74.8 ELEVATED ALKALINE PHOSPHATASE LEVEL: ICD-10-CM

## 2024-04-11 DIAGNOSIS — E11.59 HYPERTENSION ASSOCIATED WITH TYPE 2 DIABETES MELLITUS: ICD-10-CM

## 2024-04-11 DIAGNOSIS — E78.5 HYPERLIPIDEMIA ASSOCIATED WITH TYPE 2 DIABETES MELLITUS: ICD-10-CM

## 2024-04-11 DIAGNOSIS — K76.6 PORTAL HYPERTENSION: ICD-10-CM

## 2024-04-11 DIAGNOSIS — K75.81 LIVER CIRRHOSIS SECONDARY TO NASH: Primary | ICD-10-CM

## 2024-04-11 PROBLEM — E66.9 OBESITY (BMI 30.0-34.9): Status: RESOLVED | Noted: 2020-05-05 | Resolved: 2024-04-11

## 2024-04-11 PROBLEM — E66.811 OBESITY (BMI 30.0-34.9): Status: RESOLVED | Noted: 2020-05-05 | Resolved: 2024-04-11

## 2024-04-11 PROBLEM — E66.811 CLASS 1 OBESITY DUE TO EXCESS CALORIES WITH SERIOUS COMORBIDITY AND BODY MASS INDEX (BMI) OF 32.0 TO 32.9 IN ADULT: Status: ACTIVE | Noted: 2024-04-11

## 2024-04-11 PROCEDURE — 3008F BODY MASS INDEX DOCD: CPT | Mod: CPTII,,, | Performed by: NURSE PRACTITIONER

## 2024-04-11 PROCEDURE — 99214 OFFICE O/P EST MOD 30 MIN: CPT | Mod: S$PBB,,, | Performed by: NURSE PRACTITIONER

## 2024-04-11 PROCEDURE — 4010F ACE/ARB THERAPY RXD/TAKEN: CPT | Mod: CPTII,,, | Performed by: NURSE PRACTITIONER

## 2024-04-11 PROCEDURE — 1160F RVW MEDS BY RX/DR IN RCRD: CPT | Mod: CPTII,,, | Performed by: NURSE PRACTITIONER

## 2024-04-11 PROCEDURE — 99999 PR PBB SHADOW E&M-EST. PATIENT-LVL IV: CPT | Mod: PBBFAC,,, | Performed by: NURSE PRACTITIONER

## 2024-04-11 PROCEDURE — 99214 OFFICE O/P EST MOD 30 MIN: CPT | Mod: PBBFAC | Performed by: NURSE PRACTITIONER

## 2024-04-11 PROCEDURE — 3072F LOW RISK FOR RETINOPATHY: CPT | Mod: CPTII,,, | Performed by: NURSE PRACTITIONER

## 2024-04-11 PROCEDURE — 1159F MED LIST DOCD IN RCRD: CPT | Mod: CPTII,,, | Performed by: NURSE PRACTITIONER

## 2024-04-11 RX ORDER — CLOBETASOL PROPIONATE 0.5 MG/G
OINTMENT TOPICAL
COMMUNITY
Start: 2024-01-10

## 2024-04-11 NOTE — PROGRESS NOTES
[facility] Established Patient Visit    Reason for Visit:  cirrhosis      PCP: Reyna Duval    HPI:  This is a 58 y.o. female with PMH noted below, here for follow up of Well-compensated cirrhosis likely due to fatty liver versus MTX   Was on MTX for years, stopped ~2022    Mom  from CARTER cirrhosis     RA and PSA followed by rheumatology at [place]    Diagnosis of cirrhosis based on  liver biopsy  2022. No PBC on biopsy     Serological workup was negative for Levar's, alpha-1 antitrypsin deficiency, hemochromatosis, and viral hepatitis B and C    Prior serologic workup:   Lab Results   Component Value Date    FERRITIN 122 2022    FESATURATED 19 (L) 2022    CERULOPLSM 27.0 2022    HEPBSAG Non-reactive 2022    HEPCAB Non-reactive 2022     Risk factors for NAFLD include obesity, HTN, HLD, DM    Interval HPI: Presents today alone. No s/s of hepatic decompensation: no ascites, HE or h/o variceal bleeding.   Does report some possible memory loss, disorientation at times but ammonia previously WNL, do not suspect HE. On Lactulose from PCP, taking at time   On Otezla per outside rheum   Liver lesion on MRI previously discussed at IR conference (last seen 2023), recommended continued surveillance but recent MRI without lesions  On Ozempic 0.5 mg weekly, currently 208 lbs (previously 180s but increase with steroids)    Abd MRI done 3/2024 showed cirrhosis no lesions, + splenomegaly    Lab Results   Component Value Date    ALT 22 2024    AST 31 2024    ALKPHOS 153 (H) 2024    BILITOT 0.4 2024    ALBUMIN 3.6 2024    INR 1.0 2024     (L) 2024     MELD 3.0: 7 at 3/28/2024  8:06 AM  MELD-Na: 6 at 3/28/2024  8:06 AM  Calculated from:  Serum Creatinine: 0.8 mg/dL (Using min of 1 mg/dL) at 3/28/2024  8:06 AM  Serum Sodium: 141 mmol/L (Using max of 137 mmol/L) at 3/28/2024  8:06 AM  Total Bilirubin: 0.4 mg/dL (Using min  of 1 mg/dL) at 3/28/2024  8:06 AM  Serum Albumin: 3.6 g/dL (Using max of 3.5 g/dL) at 3/28/2024  8:06 AM  INR(ratio): 1.0 at 3/28/2024  8:06 AM  Age at listing (hypothetical): 58 years  Sex: Female at 3/28/2024  8:06 AM  MELD 7    Cirrhosis Health Maintenance:   -- Last  EGD 10/2023 varices, repeat 10/2024, triage phone call scheduled for closer to Oct   -- Due for next colonoscopy : 2027  -- HCC screening   MRI without concerning lesions currently, will change to US 6/2024   AFP  WNL, next due 6/2024  Lab Results   Component Value Date    AFP 6.8 03/28/2024       -- Immunity to Hep A and B - completed TwinRix     + family history of liver disease: mother with CARTER cirrhosis. Denies current alcohol consumption  or history of significant alcohol consumption in past   Social History     Substance and Sexual Activity   Alcohol Use Not Currently         PMHX:  has a past medical history of Arthritis, Diabetes mellitus, Diabetes mellitus, type 2, Essential (primary) hypertension, Fatty liver, Hyperlipidemia, Hypertension, Hyperthyroidism, Hypothyroidism, Psoriasis, and Spleen enlarged.    PSHX:  has a past surgical history that includes Thyroidectomy, partial; xi robotic hysterectomy, with salpingo-oophorectomy (Bilateral, 9/12/2022); Robot-assisted laparoscopic lysis of adhesions using da Ema Xi (N/A, 9/12/2022); Esophagogastroduodenoscopy (N/A, 11/16/2022); Colonoscopy (N/A, 11/16/2022); and Esophagogastroduodenoscopy (N/A, 10/17/2023).    The patient's social and family histories were reviewed by me and updated in the appropriate section of the electronic medical record.    Review of patient's allergies indicates:  No Known Allergies      Current Outpatient Medications on File Prior to Visit   Medication Sig Dispense Refill    amoxicillin-clavulanate 875-125mg (AUGMENTIN) 875-125 mg per tablet Take 1 tablet by mouth every 12 (twelve) hours. 14 tablet 0    atorvastatin (LIPITOR) 40 MG tablet Take 40 mg by mouth once  "daily.      clobetasol 0.05% (TEMOVATE) 0.05 % Oint Apply topically.      ergocalciferol (ERGOCALCIFEROL) 50,000 unit Cap Take 1 capsule (50,000 Units total) by mouth every 7 days. 12 capsule 3    lactulose (CHRONULAC) 20 gram/30 mL Soln Take 15 mLs (10 g total) by mouth 2 (two) times daily as needed. 1000 mL 5    levothyroxine (SYNTHROID) 125 MCG tablet TAKE 1 TABLET BY MOUTH EVERY DAY 90 tablet 1    OTEZLA 30 mg Tab Take 1 tablet by mouth 2 (two) times daily.      pantoprazole (PROTONIX) 40 MG tablet Take 1 tablet (40 mg total) by mouth once daily. 90 tablet 3    predniSONE (DELTASONE) 10 mg tablet pack Take by mouth once daily. Taking 1 half a day      semaglutide (OZEMPIC) 0.25 mg or 0.5 mg (2 mg/3 mL) pen injector Inject 0.5 mg into the skin every 7 days. 3 mL 5    traMADoL (ULTRAM) 50 mg tablet Take 1 tablet (50 mg total) by mouth every 12 (twelve) hours as needed for Pain. 60 tablet 0    triamcinolone acetonide 0.1% (KENALOG) 0.1 % cream Apply topically 2 (two) times daily. Apply to affected area. Do not apply to face, groin or underarm 80 g 1    valsartan (DIOVAN) 320 MG tablet TAKE 1 TABLET BY MOUTH ONCE DAILY. 90 tablet 2    calcium carbonate (OS-EDITA) 500 mg calcium (1,250 mg) tablet Take 2 tablets by mouth once daily. (Patient not taking: Reported on 4/11/2024)       No current facility-administered medications on file prior to visit.       SOCIAL HISTORY:   Social History     Substance and Sexual Activity   Alcohol Use Not Currently       Social History     Substance and Sexual Activity   Drug Use Not Currently       ROS:   GENERAL: Denies fatigue  CARDIOVASCULAR: Denies edema  GI: Denies abdominal pain  SKIN: Denies rash, itching   NEURO: Denies confusion, memory loss, or mood changes    Objective Findings:    PHYSICAL EXAM:   Friendly Black or  female, in no acute distress; alert and oriented to person, place and time  VITALS: Ht 5' 7.2" (1.707 m)   Wt 94.5 kg (208 lb 5.4 oz)   BMI " 32.44 kg/m²   EYES: Sclerae anicteric  GI: Soft, non-tender, non-distended. No ascites.  EXTREMITIES:  No edema.  SKIN: Warm and dry. No jaundice. No telangectasias noted. No palmar erythema.  NEURO:  No asterixis.  PSYCH:  Thought and speech pattern appropriate. Behavior normal      EDUCATION:  See instructions discussed with patient in Instructions section of the After Visit Summary       ASSESSMENT & PLAN:  58 y.o. Black or  female with:  1.  Cirrhosis, likely due to CARTER versus MTX (?), well compensated  --- cirrhosis diagnosis based on biopsy  -- MELD 3.0: 7 at 3/28/2024  8:06 AM  MELD-Na: 6 at 3/28/2024  8:06 AM  Calculated from:  Serum Creatinine: 0.8 mg/dL (Using min of 1 mg/dL) at 3/28/2024  8:06 AM  Serum Sodium: 141 mmol/L (Using max of 137 mmol/L) at 3/28/2024  8:06 AM  Total Bilirubin: 0.4 mg/dL (Using min of 1 mg/dL) at 3/28/2024  8:06 AM  Serum Albumin: 3.6 g/dL (Using max of 3.5 g/dL) at 3/28/2024  8:06 AM  INR(ratio): 1.0 at 3/28/2024  8:06 AM  Age at listing (hypothetical): 58 years  Sex: Female at 3/28/2024  8:06 AM  -- HCC screening: AFP and abd. U/S.. See below  -- Immunity to Hep A and B - see HPI  --- Serological workup: see HPI  -- Cirrhosis counseling as noted above and discussed with patient    2.  Fatty liver  -- risk factors for fatty liver: obesity, HTN, HLD, DM  Recommend:  1. Weight loss goal of 20 lbs  2. Low carb/sugar, high fiber and protein diet  3. Exercise, 5 days per week, 30 minutes per day, as tolerated  4. Recommend good cholesterol, blood pressure, blood sugar levels     3. Portal hypertension, varices  -- EGD next due 10/2024  -- No Ascites or ankle edema   -- + Splenomegaly    4. Liver lesion  -- none on recent MRI, US in 3 months. If no lesions, will change back to q6 month HCC screening with US    5. Memory loss  Ammonia WNL, do not suspect HE at this time   Defer to PCP    Follow up in about 3 months (around 7/11/2024). with US and labs a few days  before  Orders Placed This Encounter   Procedures    Ambulatory referral/consult to Endo Procedure         Thank you for allowing me to participate in the care of Tia GABRIEL De DiosC    I spent a total of 30 minutes on the day of the visit.This includes face to face time and non-face to face time preparing to see the patient (eg, review of tests), obtaining and/or reviewing separately obtained history, documenting clinical information in the electronic or other health record, independently interpreting results and communicating results to the patient/family/caregiver, and coordinating care.       CC'ed note to:

## 2024-04-15 ENCOUNTER — TELEPHONE (OUTPATIENT)
Dept: ENDOSCOPY | Facility: HOSPITAL | Age: 59
End: 2024-04-15
Payer: MEDICAID

## 2024-04-22 ENCOUNTER — TELEPHONE (OUTPATIENT)
Dept: HEPATOLOGY | Facility: CLINIC | Age: 59
End: 2024-04-22
Payer: MEDICAID

## 2024-04-22 DIAGNOSIS — K74.60 LIVER CIRRHOSIS SECONDARY TO NASH: Primary | ICD-10-CM

## 2024-04-22 DIAGNOSIS — K75.81 LIVER CIRRHOSIS SECONDARY TO NASH: Primary | ICD-10-CM

## 2024-04-22 NOTE — TELEPHONE ENCOUNTER
Received below message:    Dr. Jocelyn Chou @ [facility] /Rheumatology Dept would like a call back([phone]) to discuss pt. Parris Guevara (MRN# [id number])arthritis medication        Please call pt and notify her that I received a message from her rheumatologist that she may change her medications. Med ok from a liver standpoint but I recommend monitoring labs in 6 weeks, 3 months and 6 months    Her 6 week labs (June) are already scheduled before our visit. Please contact pt to schedule lab (hepatic function panel) in 3 months and 6 months from now     Thanks !

## 2024-04-23 ENCOUNTER — TELEPHONE (OUTPATIENT)
Dept: ENDOSCOPY | Facility: HOSPITAL | Age: 59
End: 2024-04-23
Payer: MEDICAID

## 2024-04-23 NOTE — TELEPHONE ENCOUNTER
Returned pt's call from  list. Pt has orders for EGD, but is due in October/November 2024. Pt will call then to schedule. Last EGD was done 10/17/2023 and recommended to repeat in 1 year. Pt will call us back in the Fall to schedule for then.

## 2024-05-08 ENCOUNTER — TELEPHONE (OUTPATIENT)
Dept: ENDOSCOPY | Facility: HOSPITAL | Age: 59
End: 2024-05-08
Payer: MEDICAID

## 2024-06-06 RX ORDER — TRIAMCINOLONE ACETONIDE 1 MG/G
CREAM TOPICAL
Qty: 80 G | Refills: 1 | Status: SHIPPED | OUTPATIENT
Start: 2024-06-06

## 2024-06-06 NOTE — TELEPHONE ENCOUNTER
Refill Decision Note   Parris Ismael  is requesting a refill authorization.  Brief Assessment and Rationale for Refill:  Approve     Medication Therapy Plan:         Comments:     Note composed:12:40 PM 06/06/2024

## 2024-06-06 NOTE — TELEPHONE ENCOUNTER
No care due was identified.  [facility] Embedded Care Due Messages. Reference number: [id number].   6/06/2024 12:26:43 AM CDT

## 2024-06-11 ENCOUNTER — OFFICE VISIT (OUTPATIENT)
Dept: INTERNAL MEDICINE | Facility: CLINIC | Age: 59
End: 2024-06-11
Payer: MEDICAID

## 2024-06-11 VITALS
HEART RATE: 98 BPM | HEIGHT: 67 IN | SYSTOLIC BLOOD PRESSURE: 142 MMHG | RESPIRATION RATE: 18 BRPM | TEMPERATURE: 98 F | DIASTOLIC BLOOD PRESSURE: 82 MMHG | OXYGEN SATURATION: 97 % | BODY MASS INDEX: 33.21 KG/M2 | WEIGHT: 211.63 LBS

## 2024-06-11 DIAGNOSIS — Z01.818 PREOP EXAMINATION: Primary | ICD-10-CM

## 2024-06-11 LAB
OHS QRS DURATION: 78 MS
OHS QTC CALCULATION: 456 MS

## 2024-06-11 PROCEDURE — 99214 OFFICE O/P EST MOD 30 MIN: CPT | Mod: S$PBB,,, | Performed by: HOSPITALIST

## 2024-06-11 PROCEDURE — 3077F SYST BP >= 140 MM HG: CPT | Mod: CPTII,,, | Performed by: HOSPITALIST

## 2024-06-11 PROCEDURE — 1159F MED LIST DOCD IN RCRD: CPT | Mod: CPTII,,, | Performed by: HOSPITALIST

## 2024-06-11 PROCEDURE — 4010F ACE/ARB THERAPY RXD/TAKEN: CPT | Mod: CPTII,,, | Performed by: HOSPITALIST

## 2024-06-11 PROCEDURE — 3061F NEG MICROALBUMINURIA REV: CPT | Mod: CPTII,,, | Performed by: HOSPITALIST

## 2024-06-11 PROCEDURE — 93010 ELECTROCARDIOGRAM REPORT: CPT | Mod: S$PBB,,, | Performed by: INTERNAL MEDICINE

## 2024-06-11 PROCEDURE — 3008F BODY MASS INDEX DOCD: CPT | Mod: CPTII,,, | Performed by: HOSPITALIST

## 2024-06-11 PROCEDURE — 99999 PR PBB SHADOW E&M-EST. PATIENT-LVL IV: CPT | Mod: PBBFAC,,, | Performed by: HOSPITALIST

## 2024-06-11 PROCEDURE — 3072F LOW RISK FOR RETINOPATHY: CPT | Mod: CPTII,,, | Performed by: HOSPITALIST

## 2024-06-11 PROCEDURE — 3044F HG A1C LEVEL LT 7.0%: CPT | Mod: CPTII,,, | Performed by: HOSPITALIST

## 2024-06-11 PROCEDURE — 3066F NEPHROPATHY DOC TX: CPT | Mod: CPTII,,, | Performed by: HOSPITALIST

## 2024-06-11 PROCEDURE — 3079F DIAST BP 80-89 MM HG: CPT | Mod: CPTII,,, | Performed by: HOSPITALIST

## 2024-06-11 PROCEDURE — 1160F RVW MEDS BY RX/DR IN RCRD: CPT | Mod: CPTII,,, | Performed by: HOSPITALIST

## 2024-06-11 PROCEDURE — 99214 OFFICE O/P EST MOD 30 MIN: CPT | Mod: PBBFAC,25,PO | Performed by: HOSPITALIST

## 2024-06-11 PROCEDURE — 93005 ELECTROCARDIOGRAM TRACING: CPT | Mod: PBBFAC,PO | Performed by: INTERNAL MEDICINE

## 2024-06-11 RX ORDER — CYCLOSPORINE 50 MG/1
50 CAPSULE, LIQUID FILLED ORAL 2 TIMES DAILY
COMMUNITY
Start: 2024-05-01

## 2024-06-11 NOTE — PROGRESS NOTES
Subjective:     @Patient ID: Parris Guevara is a 58 y.o. female.    Chief Complaint: No chief complaint on file.    HPI    58 y.o. female with HTN, DM2, HLD, psoriatic arthritis, CARTER cirrhosis, Grave's disease hypothyroidism s/p partial thyroidectomy at age 11 yo presents here for pre-op evaluation for R rotator cuff surgery with [facility]     History of prior anesthetic complications: no  Chronic Steroid usage: no  no tobacco, no EtOH, no Illicit substances     Surgical Risk Assessment     Active cardiac issues:  Active decompensated heart failure? No   Unstable angina?  No   Significant uncontrolled arrhythmias? No   Severe valvular heart disease-Aortic or Mitral Stenosis? No   Recent MI or coronary revascularization < 30 days? No     Clinical risk factors predicting perioperative major adverse cardiac events per RCRI  High risk surgery (suprainguinal vascular, intraperitoneal, or intrathoracic surgery)? No   History of CAD/ischemic heart disease? No   History of cerebrovascular disease (CVA or TIA)? No   History of compensated heart failure? No   Type 2 diabetes requiring insulin? No   Serum Creatinine > 2? No   Total cardiac risk factors {# 0-6:58589}     0 predictors = 0.4%, 1 predictor = 0.9%, 2 predictors = 6.6%, ?3 predictors = >11%    According to the revised cardiac risk index, the risk of tereso-procedural major cardiac complications (cardiac death, nonfatal MI, nonfatal cardiac arrest, postoperative cardiogenic pulmonary edema, complete heart block) is: ***     If patient has a low risk of MACE (<1%), proceed to surgery. If the patient is at elevated risk of MACE, then determine functional capacity (pt reported activity or DASI model).     If the patient has moderate, good, or excellent functional capacity (?4 METs), then proceed to surgery without further evaluation. If patient has poor or unknown functional capacity, will further testing impact decision making or perioperative care? If yes,  then pharmacological stress testing is appropriate. In those patients with unknown functional capacity, exercise stress testing may be reasonable to perform.     Patient's functional mets: >4 mets    < 4 METs -unable to walk > 2 blocks on level ground without stopping due to symptoms  - eating, dressing, toileting, walking indoors, light housework. POOR   > 4 METs -climbing > 1 flight of stairs without stopping  -walking up hill > 1-2 blocks  -scrubbing floors  -moving furniture  - golf, bowling, dancing or tennis  -running short distance MODERATE to EXCELLENT     OR   [email or web address]      Review of Systems   Constitutional:  Negative for chills and fever.   HENT:  Negative for congestion and sore throat.    Eyes:  Negative for pain and visual disturbance.   Respiratory:  Negative for cough and shortness of breath.    Cardiovascular:  Negative for chest pain and leg swelling.   Gastrointestinal:  Negative for abdominal pain, nausea and vomiting.   Endocrine: Negative for polydipsia and polyuria.   Genitourinary:  Negative for difficulty urinating and dysuria.   Musculoskeletal:  Positive for arthralgias. Negative for back pain.   Skin:  Negative for rash and wound.   Neurological:  Negative for dizziness, weakness and headaches.   Psychiatric/Behavioral:  Negative for agitation and confusion.      Past medical history, surgical history, and family medical history reviewed and updated as appropriate.    Medications and allergies reviewed.     Objective:     There were no vitals filed for this visit.  There is no height or weight on file to calculate BMI.  Physical Exam  Constitutional:       Appearance: Normal appearance.   HENT:      Head: Normocephalic and atraumatic.      Mouth/Throat:      Mouth: Mucous membranes are moist.      Pharynx: No oropharyngeal exudate.   Eyes:      General:         Right eye: No discharge.         Left eye: No discharge.      Conjunctiva/sclera:  Conjunctivae normal.   Cardiovascular:      Rate and Rhythm: Normal rate and regular rhythm.      Heart sounds: No murmur heard.  Pulmonary:      Effort: Pulmonary effort is normal.      Breath sounds: Normal breath sounds.   Abdominal:      General: Bowel sounds are normal. There is no distension.      Palpations: Abdomen is soft.      Tenderness: There is no abdominal tenderness.   Musculoskeletal:      Cervical back: Normal range of motion and neck supple.      Right lower leg: No edema.      Left lower leg: No edema.   Skin:     General: Skin is warm and dry.   Neurological:      Mental Status: She is alert and oriented to person, place, and time.   Psychiatric:         Mood and Affect: Mood normal.         Behavior: Behavior normal.         Lab Results   Component Value Date    WBC 6.33 03/28/2024    HGB 12.9 03/28/2024    HCT 39.7 03/28/2024     (L) 03/28/2024    CHOL 175 12/14/2023    TRIG 142 12/14/2023    HDL 47 12/14/2023    ALT 22 03/28/2024    AST 31 03/28/2024     03/28/2024    K 4.5 03/28/2024     03/28/2024    CREATININE 0.8 03/28/2024    BUN 12 03/28/2024    CO2 24 03/28/2024    TSH 4.109 (H) 12/14/2023    INR 1.0 03/28/2024    HGBA1C 5.5 12/14/2023       Assessment:     1. Preop examination      Plan:   Diagnoses and all orders for this visit:    Preop examination          No follow-ups on file.    Reyna Duval MD  Internal Medicine    6/11/2024

## 2024-06-13 ENCOUNTER — HOSPITAL ENCOUNTER (OUTPATIENT)
Dept: RADIOLOGY | Facility: HOSPITAL | Age: 59
Discharge: HOME | End: 2024-06-13
Attending: NURSE PRACTITIONER
Payer: MEDICAID

## 2024-06-13 DIAGNOSIS — K74.60 LIVER CIRRHOSIS SECONDARY TO NASH: ICD-10-CM

## 2024-06-13 DIAGNOSIS — K75.81 LIVER CIRRHOSIS SECONDARY TO NASH: ICD-10-CM

## 2024-06-13 PROCEDURE — 76705 ECHO EXAM OF ABDOMEN: CPT | Mod: 26,,, | Performed by: INTERNAL MEDICINE

## 2024-06-13 PROCEDURE — 76705 ECHO EXAM OF ABDOMEN: CPT | Mod: TC

## 2024-06-17 ENCOUNTER — TELEPHONE (OUTPATIENT)
Dept: INTERNAL MEDICINE | Facility: CLINIC | Age: 59
End: 2024-06-17
Payer: MEDICAID

## 2024-06-18 ENCOUNTER — LAB VISIT (OUTPATIENT)
Dept: LAB | Facility: HOSPITAL | Age: 59
End: 2024-06-18
Attending: HOSPITALIST
Payer: MEDICAID

## 2024-06-18 DIAGNOSIS — E89.0 POSTOPERATIVE HYPOTHYROIDISM: ICD-10-CM

## 2024-06-18 DIAGNOSIS — K74.60 LIVER CIRRHOSIS SECONDARY TO NASH: ICD-10-CM

## 2024-06-18 DIAGNOSIS — L40.50 PSORIATIC ARTHRITIS: ICD-10-CM

## 2024-06-18 DIAGNOSIS — I15.2 HYPERTENSION ASSOCIATED WITH TYPE 2 DIABETES MELLITUS: ICD-10-CM

## 2024-06-18 DIAGNOSIS — R68.89 FORGETFULNESS: ICD-10-CM

## 2024-06-18 DIAGNOSIS — E55.9 VITAMIN D DEFICIENCY: ICD-10-CM

## 2024-06-18 DIAGNOSIS — E78.5 HYPERLIPIDEMIA ASSOCIATED WITH TYPE 2 DIABETES MELLITUS: ICD-10-CM

## 2024-06-18 DIAGNOSIS — K75.81 LIVER CIRRHOSIS SECONDARY TO NASH: ICD-10-CM

## 2024-06-18 DIAGNOSIS — E11.9 TYPE 2 DIABETES MELLITUS WITHOUT COMPLICATION, WITHOUT LONG-TERM CURRENT USE OF INSULIN: ICD-10-CM

## 2024-06-18 DIAGNOSIS — E11.69 HYPERLIPIDEMIA ASSOCIATED WITH TYPE 2 DIABETES MELLITUS: ICD-10-CM

## 2024-06-18 DIAGNOSIS — Z79.899 LONG-TERM CURRENT USE OF PROTON PUMP INHIBITOR THERAPY: ICD-10-CM

## 2024-06-18 DIAGNOSIS — E11.59 HYPERTENSION ASSOCIATED WITH TYPE 2 DIABETES MELLITUS: ICD-10-CM

## 2024-06-18 LAB
25(OH)D3+25(OH)D2 SERPL-MCNC: 25 NG/ML (ref 30–96)
AFP SERPL-MCNC: 6.6 NG/ML (ref 0–8.4)
ALBUMIN SERPL BCP-MCNC: 3.5 G/DL (ref 3.5–5.2)
ALP SERPL-CCNC: 142 U/L (ref 55–135)
ALT SERPL W/O P-5'-P-CCNC: 21 U/L (ref 10–44)
AMMONIA PLAS-SCNC: 29 UMOL/L (ref 10–50)
ANION GAP SERPL CALC-SCNC: 9 MMOL/L (ref 8–16)
AST SERPL-CCNC: 30 U/L (ref 10–40)
BASOPHILS # BLD AUTO: 0.03 K/UL (ref 0–0.2)
BASOPHILS NFR BLD: 0.5 % (ref 0–1.9)
BILIRUB SERPL-MCNC: 0.4 MG/DL (ref 0.1–1)
BUN SERPL-MCNC: 14 MG/DL (ref 6–20)
CALCIUM SERPL-MCNC: 9.2 MG/DL (ref 8.7–10.5)
CHLORIDE SERPL-SCNC: 109 MMOL/L (ref 95–110)
CHOLEST SERPL-MCNC: 190 MG/DL (ref 120–199)
CHOLEST/HDLC SERPL: 4 {RATIO} (ref 2–5)
CO2 SERPL-SCNC: 23 MMOL/L (ref 23–29)
CREAT SERPL-MCNC: 0.9 MG/DL (ref 0.5–1.4)
DIFFERENTIAL METHOD BLD: NORMAL
EOSINOPHIL # BLD AUTO: 0.2 K/UL (ref 0–0.5)
EOSINOPHIL NFR BLD: 2.4 % (ref 0–8)
ERYTHROCYTE [DISTWIDTH] IN BLOOD BY AUTOMATED COUNT: 13.8 % (ref 11.5–14.5)
EST. GFR  (NO RACE VARIABLE): >60 ML/MIN/1.73 M^2
ESTIMATED AVG GLUCOSE: 100 MG/DL (ref 68–131)
GLUCOSE SERPL-MCNC: 95 MG/DL (ref 70–110)
HBA1C MFR BLD: 5.1 % (ref 4–5.6)
HCT VFR BLD AUTO: 38.5 % (ref 37–48.5)
HDLC SERPL-MCNC: 48 MG/DL (ref 40–75)
HDLC SERPL: 25.3 % (ref 20–50)
HGB BLD-MCNC: 12.4 G/DL (ref 12–16)
IMM GRANULOCYTES # BLD AUTO: 0.01 K/UL (ref 0–0.04)
IMM GRANULOCYTES NFR BLD AUTO: 0.2 % (ref 0–0.5)
INR PPP: 1 (ref 0.8–1.2)
LDLC SERPL CALC-MCNC: 123.4 MG/DL (ref 63–159)
LYMPHOCYTES # BLD AUTO: 1.4 K/UL (ref 1–4.8)
LYMPHOCYTES NFR BLD: 22.9 % (ref 18–48)
MCH RBC QN AUTO: 30.7 PG (ref 27–31)
MCHC RBC AUTO-ENTMCNC: 32.2 G/DL (ref 32–36)
MCV RBC AUTO: 95 FL (ref 82–98)
MONOCYTES # BLD AUTO: 0.4 K/UL (ref 0.3–1)
MONOCYTES NFR BLD: 7.1 % (ref 4–15)
NEUTROPHILS # BLD AUTO: 4.2 K/UL (ref 1.8–7.7)
NEUTROPHILS NFR BLD: 66.9 % (ref 38–73)
NONHDLC SERPL-MCNC: 142 MG/DL
NRBC BLD-RTO: 0 /100 WBC
PLATELET # BLD AUTO: 151 K/UL (ref 150–450)
PMV BLD AUTO: 11.6 FL (ref 9.2–12.9)
POTASSIUM SERPL-SCNC: 4 MMOL/L (ref 3.5–5.1)
PROT SERPL-MCNC: 8.2 G/DL (ref 6–8.4)
PROTHROMBIN TIME: 11 SEC (ref 9–12.5)
RBC # BLD AUTO: 4.04 M/UL (ref 4–5.4)
SODIUM SERPL-SCNC: 141 MMOL/L (ref 136–145)
TRIGL SERPL-MCNC: 93 MG/DL (ref 30–150)
TSH SERPL DL<=0.005 MIU/L-ACNC: 2.25 UIU/ML (ref 0.4–4)
VIT B12 SERPL-MCNC: 804 PG/ML (ref 210–950)
WBC # BLD AUTO: 6.21 K/UL (ref 3.9–12.7)

## 2024-06-18 PROCEDURE — 84443 ASSAY THYROID STIM HORMONE: CPT | Performed by: HOSPITALIST

## 2024-06-18 PROCEDURE — 85610 PROTHROMBIN TIME: CPT | Performed by: HOSPITALIST

## 2024-06-18 PROCEDURE — 80061 LIPID PANEL: CPT | Performed by: HOSPITALIST

## 2024-06-18 PROCEDURE — 80053 COMPREHEN METABOLIC PANEL: CPT | Performed by: HOSPITALIST

## 2024-06-18 PROCEDURE — 82607 VITAMIN B-12: CPT | Performed by: HOSPITALIST

## 2024-06-18 PROCEDURE — 82306 VITAMIN D 25 HYDROXY: CPT | Performed by: HOSPITALIST

## 2024-06-18 PROCEDURE — 82105 ALPHA-FETOPROTEIN SERUM: CPT | Performed by: NURSE PRACTITIONER

## 2024-06-18 PROCEDURE — 83036 HEMOGLOBIN GLYCOSYLATED A1C: CPT | Performed by: HOSPITALIST

## 2024-06-18 PROCEDURE — 82140 ASSAY OF AMMONIA: CPT | Performed by: HOSPITALIST

## 2024-06-18 PROCEDURE — 36415 COLL VENOUS BLD VENIPUNCTURE: CPT | Mod: PO | Performed by: HOSPITALIST

## 2024-06-18 PROCEDURE — 85025 COMPLETE CBC W/AUTO DIFF WBC: CPT | Performed by: HOSPITALIST

## 2024-06-20 ENCOUNTER — OFFICE VISIT (OUTPATIENT)
Dept: HEPATOLOGY | Facility: CLINIC | Age: 59
End: 2024-06-20
Payer: MEDICAID

## 2024-06-20 VITALS — BODY MASS INDEX: 33.28 KG/M2 | WEIGHT: 212.06 LBS | HEIGHT: 67 IN

## 2024-06-20 DIAGNOSIS — K75.81 LIVER CIRRHOSIS SECONDARY TO NASH: Primary | ICD-10-CM

## 2024-06-20 DIAGNOSIS — E78.5 HYPERLIPIDEMIA ASSOCIATED WITH TYPE 2 DIABETES MELLITUS: ICD-10-CM

## 2024-06-20 DIAGNOSIS — E66.09 CLASS 1 OBESITY DUE TO EXCESS CALORIES WITH SERIOUS COMORBIDITY AND BODY MASS INDEX (BMI) OF 33.0 TO 33.9 IN ADULT: ICD-10-CM

## 2024-06-20 DIAGNOSIS — E11.69 HYPERLIPIDEMIA ASSOCIATED WITH TYPE 2 DIABETES MELLITUS: ICD-10-CM

## 2024-06-20 DIAGNOSIS — K74.60 LIVER CIRRHOSIS SECONDARY TO NASH: Primary | ICD-10-CM

## 2024-06-20 DIAGNOSIS — K76.6 PORTAL HYPERTENSION: ICD-10-CM

## 2024-06-20 DIAGNOSIS — I15.2 HYPERTENSION ASSOCIATED WITH TYPE 2 DIABETES MELLITUS: ICD-10-CM

## 2024-06-20 DIAGNOSIS — E11.59 HYPERTENSION ASSOCIATED WITH TYPE 2 DIABETES MELLITUS: ICD-10-CM

## 2024-06-20 DIAGNOSIS — R74.8 ELEVATED ALKALINE PHOSPHATASE LEVEL: ICD-10-CM

## 2024-06-20 PROCEDURE — 99999 PR PBB SHADOW E&M-EST. PATIENT-LVL III: CPT | Mod: PBBFAC,,, | Performed by: NURSE PRACTITIONER

## 2024-06-20 PROCEDURE — 3066F NEPHROPATHY DOC TX: CPT | Mod: CPTII,,, | Performed by: NURSE PRACTITIONER

## 2024-06-20 PROCEDURE — 3008F BODY MASS INDEX DOCD: CPT | Mod: CPTII,,, | Performed by: NURSE PRACTITIONER

## 2024-06-20 PROCEDURE — 1160F RVW MEDS BY RX/DR IN RCRD: CPT | Mod: CPTII,,, | Performed by: NURSE PRACTITIONER

## 2024-06-20 PROCEDURE — 99214 OFFICE O/P EST MOD 30 MIN: CPT | Mod: S$PBB,,, | Performed by: NURSE PRACTITIONER

## 2024-06-20 PROCEDURE — 1159F MED LIST DOCD IN RCRD: CPT | Mod: CPTII,,, | Performed by: NURSE PRACTITIONER

## 2024-06-20 PROCEDURE — 4010F ACE/ARB THERAPY RXD/TAKEN: CPT | Mod: CPTII,,, | Performed by: NURSE PRACTITIONER

## 2024-06-20 PROCEDURE — 99213 OFFICE O/P EST LOW 20 MIN: CPT | Mod: PBBFAC | Performed by: NURSE PRACTITIONER

## 2024-06-20 PROCEDURE — 3061F NEG MICROALBUMINURIA REV: CPT | Mod: CPTII,,, | Performed by: NURSE PRACTITIONER

## 2024-06-20 PROCEDURE — 3072F LOW RISK FOR RETINOPATHY: CPT | Mod: CPTII,,, | Performed by: NURSE PRACTITIONER

## 2024-06-20 PROCEDURE — 3044F HG A1C LEVEL LT 7.0%: CPT | Mod: CPTII,,, | Performed by: NURSE PRACTITIONER

## 2024-06-20 NOTE — PROGRESS NOTES
[facility] Established Patient Visit    Reason for Visit:  cirrhosis      PCP: Reyna Duval    HPI:  This is a 58 y.o. female with PMH noted below, here for follow up of Well-compensated cirrhosis likely due to fatty liver versus MTX   Was on MTX for years, stopped ~2022    Mom  from CARTER cirrhosis     RA and PSA followed by rheumatology at [place]    Diagnosis of cirrhosis based on  liver biopsy  2022. No PBC on biopsy     Serological workup was negative for Levar's, alpha-1 antitrypsin deficiency, hemochromatosis, and viral hepatitis B and C    Prior serologic workup:   Lab Results   Component Value Date    FERRITIN 122 2022    FESATURATED 19 (L) 2022    CERULOPLSM 27.0 2022    HEPBSAG Non-reactive 2022    HEPCAB Non-reactive 2022     Risk factors for NAFLD include obesity, HTN, HLD, DM    Interval HPI: Presents today alone. No s/s of hepatic decompensation: no ascites, HE or h/o variceal bleeding.   Has upcoming shoulder surgery   On Cyclosporine by rheum, can increase alk phos so monitoring labs   Does report some possible memory loss, disorientation at times but ammonia previously WNL, do not suspect HE. On Lactulose from PCP, taking at time   Liver lesion on MRI previously discussed at IR conference (last seen 2023), US without concerning lesion   On Ozempic 0.5 mg weekly, currently 212 lbs (previously 180s but increase with steroids)  Ammonia WNL    Abd US done 2024 showed cirrhosis no lesions, + splenomegaly    Lab Results   Component Value Date    ALT 21 2024    AST 30 2024    ALKPHOS 142 (H) 2024    BILITOT 0.4 2024    ALBUMIN 3.5 2024    INR 1.0 2024     2024     MELD 3.0: 7 at 2024  8:19 AM  MELD-Na: 6 at 2024  8:19 AM  Calculated from:  Serum Creatinine: 0.9 mg/dL (Using min of 1 mg/dL) at 2024  8:19 AM  Serum Sodium: 141 mmol/L (Using max of 137 mmol/L) at 2024  8:19  AM  Total Bilirubin: 0.4 mg/dL (Using min of 1 mg/dL) at 6/18/2024  8:19 AM  Serum Albumin: 3.5 g/dL at 6/18/2024  8:19 AM  INR(ratio): 1.0 at 6/18/2024  8:19 AM  Age at listing (hypothetical): 58 years  Sex: Female at 6/18/2024  8:19 AM  MELD 7    Cirrhosis Health Maintenance:   -- Last  EGD 10/2023 varices, repeat 10/2024, triage phone call scheduled  -- Due for next colonoscopy : 2027  -- HCC screening   US without concerning lesion, repeat 12/2024   AFP  WNL, next due 12/2024  Lab Results   Component Value Date    AFP 6.6 06/18/2024       -- Immunity to Hep A and B - completed TwinRix     + family history of liver disease: mother with CARTER cirrhosis. Denies current alcohol consumption  or history of significant alcohol consumption in past   Social History     Substance and Sexual Activity   Alcohol Use Not Currently         PMHX:  has a past medical history of Arthritis, Diabetes mellitus, type 2, Essential (primary) hypertension, Fatty liver, Hyperlipidemia, Hypertension, Hyperthyroidism, Hypothyroidism, Psoriasis, and Spleen enlarged.    PSHX:  has a past surgical history that includes Thyroidectomy, partial; xi robotic hysterectomy, with salpingo-oophorectomy (Bilateral, 9/12/2022); Robot-assisted laparoscopic lysis of adhesions using da Ema Xi (N/A, 9/12/2022); Esophagogastroduodenoscopy (N/A, 11/16/2022); Colonoscopy (N/A, 11/16/2022); and Esophagogastroduodenoscopy (N/A, 10/17/2023).    The patient's social and family histories were reviewed by me and updated in the appropriate section of the electronic medical record.    Review of patient's allergies indicates:  No Known Allergies      Current Outpatient Medications on File Prior to Visit   Medication Sig Dispense Refill    atorvastatin (LIPITOR) 40 MG tablet Take 40 mg by mouth once daily.      calcium carbonate (OS-EDITA) 500 mg calcium (1,250 mg) tablet Take 2 tablets by mouth once daily. (Patient not taking: Reported on 4/11/2024)      clobetasol  "0.05% (TEMOVATE) 0.05 % Oint Apply topically.      cycloSPORINE modified, NEORAL, (NEORAL) 50 MG capsule Take 50 mg by mouth 2 (two) times daily.      ergocalciferol (ERGOCALCIFEROL) 50,000 unit Cap Take 1 capsule (50,000 Units total) by mouth every 7 days. 12 capsule 3    lactulose (CHRONULAC) 20 gram/30 mL Soln Take 15 mLs (10 g total) by mouth 2 (two) times daily as needed. 1000 mL 5    levothyroxine (SYNTHROID) 125 MCG tablet TAKE 1 TABLET BY MOUTH EVERY DAY 90 tablet 1    pantoprazole (PROTONIX) 40 MG tablet Take 1 tablet (40 mg total) by mouth once daily. 90 tablet 3    semaglutide (OZEMPIC) 0.25 mg or 0.5 mg (2 mg/3 mL) pen injector Inject 0.5 mg into the skin every 7 days. 3 mL 5    traMADoL (ULTRAM) 50 mg tablet Take 1 tablet (50 mg total) by mouth every 12 (twelve) hours as needed for Pain. (Patient not taking: Reported on 6/11/2024) 60 tablet 0    triamcinolone acetonide 0.1% (KENALOG) 0.1 % cream APPLY TO AFFECTED AREA TWICE A DAY DO NOT APPLY TO FACE, GROIN OR UNDERARM 80 g 1    valsartan (DIOVAN) 320 MG tablet TAKE 1 TABLET BY MOUTH ONCE DAILY. 90 tablet 2     No current facility-administered medications on file prior to visit.       SOCIAL HISTORY:   Social History     Substance and Sexual Activity   Alcohol Use Not Currently       Social History     Substance and Sexual Activity   Drug Use Not Currently       ROS:   GENERAL: intermittent fatigue  CARDIOVASCULAR: Denies edema  GI: Denies abdominal pain  SKIN: Denies rash, itching   NEURO: Denies confusion, memory loss, or mood changes    Objective Findings:    PHYSICAL EXAM:   Friendly Black or  female, in no acute distress; alert and oriented to person, place and time  VITALS: Ht 5' 7" (1.702 m)   Wt 96.2 kg (212 lb 1.3 oz)   BMI 33.22 kg/m²   EYES: Sclerae anicteric  GI: Soft, non-tender, non-distended. No ascites.  EXTREMITIES:  No edema.  SKIN: Warm and dry. No jaundice. No telangectasias noted. No palmar erythema.  NEURO:  No " asterixis.  PSYCH:  Thought and speech pattern appropriate. Behavior normal      EDUCATION:  See instructions discussed with patient in Instructions section of the After Visit Summary       ASSESSMENT & PLAN:  58 y.o. Black or  female with:  1.  Cirrhosis, likely due to CARTER versus MTX (?), well compensated  --- cirrhosis diagnosis based on biopsy  -- MELD 3.0: 7 at 6/18/2024  8:19 AM  MELD-Na: 6 at 6/18/2024  8:19 AM  Calculated from:  Serum Creatinine: 0.9 mg/dL (Using min of 1 mg/dL) at 6/18/2024  8:19 AM  Serum Sodium: 141 mmol/L (Using max of 137 mmol/L) at 6/18/2024  8:19 AM  Total Bilirubin: 0.4 mg/dL (Using min of 1 mg/dL) at 6/18/2024  8:19 AM  Serum Albumin: 3.5 g/dL at 6/18/2024  8:19 AM  INR(ratio): 1.0 at 6/18/2024  8:19 AM  Age at listing (hypothetical): 58 years  Sex: Female at 6/18/2024  8:19 AM  -- HCC screening: AFP and abd. U/S.. See below  -- Immunity to Hep A and B - see HPI  --- Serological workup: see HPI  -- Cirrhosis counseling as noted above and discussed with patient    2.  Fatty liver  -- risk factors for fatty liver: obesity, HTN, HLD, DM  Recommend:  1. Weight loss goal of 20 lbs  2. Low carb/sugar, high fiber and protein diet  3. Exercise, 5 days per week, 30 minutes per day, as tolerated  4. Recommend good cholesterol, blood pressure, blood sugar levels   5. Cannot use Rezdiffra with cirrhosis currently     3. Portal hypertension, varices  -- EGD next due 10/2024, triage phone call scheduled   -- No Ascites or ankle edema   -- + Splenomegaly    4. H/o Liver lesion   -- US q6 months    5. Upcoming surgery  Liver function WNL, low risk of decompensation per VOCAL MECCA score below  Pt aware of risk and to notify me if develops any fluid overload/HE after surgery but risk low                 Follow up in about 6 months (around 12/20/2024). with US and labs a few days before  Orders Placed This Encounter   Procedures    US Abdomen Limited    AFP Tumor Marker    CBC Without  Differential    Comprehensive Metabolic Panel    Protime-INR        Thank you for allowing me to participate in the care of Parris Guevara    ARABELLA Fields    I spent a total of 30 minutes on the day of the visit.This includes face to face time and non-face to face time preparing to see the patient (eg, review of tests), obtaining and/or reviewing separately obtained history, documenting clinical information in the electronic or other health record, independently interpreting results and communicating results to the patient/family/caregiver, and coordinating care.       CC'ed note to:

## 2024-06-20 NOTE — PATIENT INSTRUCTIONS
This is a web site that you may find helpful about cirrhosis : [email or web address]    Because you have cirrhosis, it is important to attend clinic visits every 6 months with an Ultrasound and blood tests every 6 months to screen for liver cancer (you are at risk of developing liver cancer due to scar tissue in the liver)    Signs and symptoms of worsening liver disease include jaundice, fluid in the belly (ascites), and confusion/disorientation/slowed thought processes due to hepatic encephalopathy (toxins building up because of liver problems).   You should seek medical attention if any of these things occur.    Also, possible bleeding from esophageal varices (blood vessels in the stomach and foodpipe can burst and cause fatal bleeding).  Therefore, if you have symptoms of vomiting blood, blood in your stool, dark or black stools or vomiting coffee ground vomit, YOU SHOULD GO TO THE EMERGENCY ROOM IMMEDIATELY.     Cirrhosis can increase the risk of liver cancer, liver failure, and death. However, we will watch your liver function score (MELD score) closely with each clinic visit. A normal MELD score is 6, highest is 40. Your last one was an 7. We will check this with every clinic visit. A MELD 15 or higher is when we start to consider transplant because MELD 15 or higher indicates that the liver is not functioning as well     Cirrhosis Counseling  - NO alcohol use (includes beer, wine, and/or liquor)  - avoid non-steroidal anti-inflammatory drugs (NSAIDs) such as ibuprofen, Motrin, naprosyn, Alleve due to the risk of kidney damage  - can take acetaminophen (Tylenol), no more than 2000 mg per day  - low sodium (salt) 2 gram per day diet  - high protein diet: 100 grams per day to prevent muscle mass loss. Drink at least 1 protein shake daily (Premier Protein is best option because it is very high protein and low sugar). Ok to use this as nighttime snack to fit it in   - resistance exercises for muscle  strength  - avoid raw seafoods due to the risk of fatal Vibrio vulnificus infection  - ultrasound of the liver every 6 months for liver cancer screening (you are at risk of developing liver cancer due to scar tissue in the liver)  - Upper endoscopy every 1-2 years to screen for varices in the stomach and foodpipe which can burst and cause fatal bleeding

## 2024-06-27 ENCOUNTER — TELEPHONE (OUTPATIENT)
Dept: INTERNAL MEDICINE | Facility: CLINIC | Age: 59
End: 2024-06-27
Payer: MEDICAID

## 2024-06-27 NOTE — TELEPHONE ENCOUNTER
----- Message from Nelida Oliver sent at 6/27/2024  8:25 AM CDT -----  Contact: [phone]  Mirella with [facility] Dr Martins office is calling she states the sent the clearance form for the pt and they are needing this to be filled out and sent back the pts surgery is scheduled for 08/20 please advise     Please scan back in chart or fax    Fax [phone]

## 2024-06-27 NOTE — TELEPHONE ENCOUNTER
Spoke to vanessa with [facility] she was informed that we did not received the clearance . She  will print it and fax it to the office

## 2024-07-07 NOTE — TELEPHONE ENCOUNTER
No care due was identified.  [facility] Embedded Care Due Messages. Reference number: [id number].   7/07/2024 7:06:04 AM CDT

## 2024-07-08 RX ORDER — LEVOTHYROXINE SODIUM 125 UG/1
125 TABLET ORAL
Qty: 90 TABLET | Refills: 3 | Status: SHIPPED | OUTPATIENT
Start: 2024-07-08

## 2024-07-08 RX ORDER — VALSARTAN 320 MG/1
320 TABLET ORAL
Qty: 90 TABLET | Refills: 3 | Status: SHIPPED | OUTPATIENT
Start: 2024-07-08

## 2024-07-08 NOTE — TELEPHONE ENCOUNTER
Refill Routing Note   Medication(s) are not appropriate for processing by [facility] for the following reason(s):        Required vitals abnormal    [facility] action(s):  Approve  Defer               Appointments  past 12m or future 3m with PCP    Date Provider   Last Visit   6/11/2024 Reyna Duval MD   Next Visit   Visit date not found Reyna Duval MD   ED visits in past 90 days: 0        Note composed:11:16 AM 07/08/2024

## 2024-07-22 ENCOUNTER — LAB VISIT (OUTPATIENT)
Dept: LAB | Facility: HOSPITAL | Age: 59
End: 2024-07-22
Payer: MEDICAID

## 2024-07-22 DIAGNOSIS — K75.81 LIVER CIRRHOSIS SECONDARY TO NASH: ICD-10-CM

## 2024-07-22 DIAGNOSIS — K74.60 LIVER CIRRHOSIS SECONDARY TO NASH: ICD-10-CM

## 2024-07-22 LAB
ALBUMIN SERPL BCP-MCNC: 4.2 G/DL (ref 3.5–5.2)
ALP SERPL-CCNC: 135 U/L (ref 38–126)
ALT SERPL W/O P-5'-P-CCNC: 30 U/L (ref 10–44)
AST SERPL-CCNC: 41 U/L (ref 15–46)
BILIRUB SERPL-MCNC: 0.5 MG/DL (ref 0.1–1)
PROT SERPL-MCNC: 8.6 G/DL (ref 6–8.4)

## 2024-07-22 PROCEDURE — 80076 HEPATIC FUNCTION PANEL: CPT | Mod: PN | Performed by: NURSE PRACTITIONER

## 2024-07-22 PROCEDURE — 36415 COLL VENOUS BLD VENIPUNCTURE: CPT | Mod: PN | Performed by: NURSE PRACTITIONER

## 2024-08-15 RX ORDER — TRIAMCINOLONE ACETONIDE 1 MG/G
CREAM TOPICAL
Qty: 80 G | Refills: 1 | Status: SHIPPED | OUTPATIENT
Start: 2024-08-15

## 2024-08-15 NOTE — TELEPHONE ENCOUNTER
Refill Decision Note   Parris Ismael  is requesting a refill authorization.  Brief Assessment and Rationale for Refill:  Approve     Medication Therapy Plan:         Comments:     Note composed:7:48 AM 08/15/2024

## 2024-08-15 NOTE — TELEPHONE ENCOUNTER
No care due was identified.  [facility] Embedded Care Due Messages. Reference number: [id number].   8/15/2024 1:18:35 AM CDT

## 2024-08-29 ENCOUNTER — CLINICAL SUPPORT (OUTPATIENT)
Dept: ENDOSCOPY | Facility: HOSPITAL | Age: 59
End: 2024-08-29
Payer: MEDICAID

## 2024-08-29 ENCOUNTER — TELEPHONE (OUTPATIENT)
Dept: ENDOSCOPY | Facility: HOSPITAL | Age: 59
End: 2024-08-29

## 2024-08-29 VITALS — BODY MASS INDEX: 33.27 KG/M2 | WEIGHT: 212 LBS | HEIGHT: 67 IN

## 2024-08-29 DIAGNOSIS — K75.81 LIVER CIRRHOSIS SECONDARY TO NASH: ICD-10-CM

## 2024-08-29 DIAGNOSIS — K76.6 PORTAL HYPERTENSION: ICD-10-CM

## 2024-08-29 DIAGNOSIS — K74.60 LIVER CIRRHOSIS SECONDARY TO NASH: ICD-10-CM

## 2024-08-29 NOTE — TELEPHONE ENCOUNTER
Spoke to patient to schedule procedure(s) Upper Endoscopy (EGD)       Physician to perform procedure(s) Dr. OLIVIER Villatoro  Date of Procedure (s) 10/16/24  Arrival Time 10:00 AM  Time of Procedure(s) 11:00 AM   Location of Procedure(s) [address]  Type of Rx Prep sent to patient: N/A  Instructions provided to patient via [facility]    Patient was informed on the following information and verbalized understanding. Screening questionnaire reviewed with patient and complete. If procedure requires anesthesia, a responsible adult needs to be present to accompany the patient home, patient cannot drive after receiving anesthesia. Appointment details are tentative, especially check-in time. Patient will receive a prep-op call 7 days prior to confirm check-in time for procedure. If applicable the patient should contact their pharmacy to verify Rx for procedure prep is ready for pick-up. Patient was advised to call the scheduling department at [phone] if pharmacy states no Rx is available. Patient was advised to call the endoscopy scheduling department if any questions or concerns arise.      [facility] Scheduling Department

## 2024-10-01 ENCOUNTER — PATIENT MESSAGE (OUTPATIENT)
Dept: INTERNAL MEDICINE | Facility: CLINIC | Age: 59
End: 2024-10-01
Payer: MEDICAID

## 2024-10-09 ENCOUNTER — TELEPHONE (OUTPATIENT)
Dept: ENDOSCOPY | Facility: HOSPITAL | Age: 59
End: 2024-10-09
Payer: MEDICAID

## 2024-10-09 NOTE — TELEPHONE ENCOUNTER
Spoke to patient for pre-call to confirm scheduled Upper Endoscopy (EGD) and patient verbalized understanding of the following:       Date of Procedure (s)  verified 10/16/24  Arrival Time 10:00 AM verified. Lbs for 9:30 AM  Location of Procedure(s) [place] [address] Floor verified.  NPO status reinforced. Ok to continue clear liquids up until 4 hours prior to the Endoscopy procedure.   Confirmed Pt is currently taking Ozempic (Semaglutide), Pt instructed to stop stop taking Ozempic (Semaglutide) on 10/8/24. Pt states she stopped 2 months ago..       Pt confirmed receipt of prep instructions and Rx prep (if applicable).  Instructions provided to patient via [facility]  Pt confirmed ride home after procedure if procedure requires anesthesia.   Pre-call screening questionnaire reviewed and completed with patient.   Appointment details are tentative, including check-in time.  If the patient begins taking any blood thinning medications, injectable weight loss/diabetes medications (other than insulin), or Adipex (phentermine) patient was instructed to contact the endoscopy scheduling department as soon as possible.  Patient was advised to call the endoscopy scheduling department if any questions or concerns arise.       [facility] Scheduling Department

## 2024-10-15 ENCOUNTER — ANESTHESIA EVENT (OUTPATIENT)
Dept: ENDOSCOPY | Facility: HOSPITAL | Age: 59
End: 2024-10-15
Payer: MEDICAID

## 2024-10-16 ENCOUNTER — HOSPITAL ENCOUNTER (OUTPATIENT)
Facility: HOSPITAL | Age: 59
Discharge: HOME | End: 2024-10-16
Attending: STUDENT IN AN ORGANIZED HEALTH CARE EDUCATION/TRAINING PROGRAM | Admitting: STUDENT IN AN ORGANIZED HEALTH CARE EDUCATION/TRAINING PROGRAM
Payer: MEDICAID

## 2024-10-16 ENCOUNTER — ANESTHESIA (OUTPATIENT)
Dept: ENDOSCOPY | Facility: HOSPITAL | Age: 59
End: 2024-10-16
Payer: MEDICAID

## 2024-10-16 VITALS
WEIGHT: 210 LBS | HEART RATE: 87 BPM | BODY MASS INDEX: 32.96 KG/M2 | DIASTOLIC BLOOD PRESSURE: 68 MMHG | OXYGEN SATURATION: 99 % | HEIGHT: 67 IN | RESPIRATION RATE: 18 BRPM | TEMPERATURE: 98 F | SYSTOLIC BLOOD PRESSURE: 131 MMHG

## 2024-10-16 DIAGNOSIS — K74.60 LIVER CIRRHOSIS SECONDARY TO NASH: ICD-10-CM

## 2024-10-16 DIAGNOSIS — I85.10 SECONDARY ESOPHAGEAL VARICES WITHOUT BLEEDING: Primary | ICD-10-CM

## 2024-10-16 DIAGNOSIS — I85.00 ESOPHAGEAL VARICES: ICD-10-CM

## 2024-10-16 DIAGNOSIS — K75.81 LIVER CIRRHOSIS SECONDARY TO NASH: ICD-10-CM

## 2024-10-16 LAB
GLUCOSE SERPL-MCNC: 97 MG/DL (ref 70–110)
POCT GLUCOSE: 97 MG/DL (ref 70–110)

## 2024-10-16 PROCEDURE — 63600175 PHARM REV CODE 636 W HCPCS

## 2024-10-16 PROCEDURE — 82962 GLUCOSE BLOOD TEST: CPT | Performed by: STUDENT IN AN ORGANIZED HEALTH CARE EDUCATION/TRAINING PROGRAM

## 2024-10-16 PROCEDURE — 43235 EGD DIAGNOSTIC BRUSH WASH: CPT | Performed by: STUDENT IN AN ORGANIZED HEALTH CARE EDUCATION/TRAINING PROGRAM

## 2024-10-16 PROCEDURE — 43235 EGD DIAGNOSTIC BRUSH WASH: CPT | Mod: ,,, | Performed by: STUDENT IN AN ORGANIZED HEALTH CARE EDUCATION/TRAINING PROGRAM

## 2024-10-16 PROCEDURE — 37000008 HC ANESTHESIA 1ST 15 MINUTES: Performed by: STUDENT IN AN ORGANIZED HEALTH CARE EDUCATION/TRAINING PROGRAM

## 2024-10-16 RX ORDER — PROPOFOL 10 MG/ML
VIAL (ML) INTRAVENOUS
Status: DISCONTINUED | OUTPATIENT
Start: 2024-10-16 | End: 2024-10-16

## 2024-10-16 RX ORDER — LIDOCAINE HYDROCHLORIDE 20 MG/ML
INJECTION INTRAVENOUS
Status: DISCONTINUED | OUTPATIENT
Start: 2024-10-16 | End: 2024-10-16

## 2024-10-16 RX ORDER — SODIUM CHLORIDE 9 MG/ML
INJECTION, SOLUTION INTRAVENOUS CONTINUOUS
Status: DISCONTINUED | OUTPATIENT
Start: 2024-10-16 | End: 2024-10-16 | Stop reason: HOSPADM

## 2024-10-16 RX ADMIN — LIDOCAINE HYDROCHLORIDE 100 MG: 20 INJECTION INTRAVENOUS at 10:10

## 2024-10-16 RX ADMIN — PROPOFOL 100 MG: 10 INJECTION, EMULSION INTRAVENOUS at 10:10

## 2024-10-16 RX ADMIN — GLYCOPYRROLATE 0.2 MG: 0.2 INJECTION, SOLUTION INTRAMUSCULAR; INTRAVENOUS at 10:10

## 2024-10-16 RX ADMIN — PROPOFOL 200 MCG/KG/MIN: 10 INJECTION, EMULSION INTRAVENOUS at 10:10

## 2024-10-16 NOTE — [employer] PATIENT INSTRUCTIONS
Discharge Summary/Instructions after an Endoscopic Procedure  Patient Name: Parris Guevara  Patient MRN: [id number]  Patient YOB: 1965 Wednesday, October 16, 2024  Jose Carlos Villatoro MD  Dear patient,  As a result of recent federal legislation (The Federal Cures Act), you may   receive lab or pathology results from your procedure in your [facility]   account before your physician is able to contact you. Your physician or   their representative will relay the results to you with their   recommendations at their soonest availability.  Thank you,  RESTRICTIONS:  During your procedure today, you received medications for sedation.  These   medications may affect your judgment, balance and coordination.  Therefore,   for 24 hours, you have the following restrictions:   - DO NOT drive a car, operate machinery, make legal/financial decisions,   sign important papers or drink alcohol.    ACTIVITY:  Today: no heavy lifting, straining or running due to procedural   sedation/anesthesia.  The following day: return to full activity including work.  DIET:  Eat and drink normally unless instructed otherwise.     TREATMENT FOR COMMON SIDE EFFECTS:  - Mild abdominal pain, nausea, belching, bloating or excessive gas:  rest,   eat lightly and use a heating pad.  - Sore Throat: treat with throat lozenges and/or gargle with warm salt   water.  - Because air was used during the procedure, expelling large amounts of air   from your rectum or belching is normal.  - If a bowel prep was taken, you may not have a bowel movement for 1-3 days.    This is normal.  SYMPTOMS TO WATCH FOR AND REPORT TO YOUR PHYSICIAN:  1. Abdominal pain or bloating, other than gas cramps.  2. Chest pain.  3. Back pain.  4. Signs of infection such as: chills or fever occurring within 24 hours   after the procedure.  5. Rectal bleeding, which would show as bright red, maroon, or black stools.   (A tablespoon of blood from the rectum is not serious, especially if    hemorrhoids are present.)  6. Vomiting.  7. Weakness or dizziness.  GO DIRECTLY TO THE NEAREST EMERGENCY ROOM IF YOU HAVE ANY OF THE FOLLOWING:      Difficulty breathing              Chills and/or fever over 101 F   Persistent vomiting and/or vomiting blood   Severe abdominal pain   Severe chest pain   Black, tarry stools   Bleeding- more than one tablespoon   Any other symptom or condition that you feel may need urgent attention  Your doctor recommends these additional instructions:  If any biopsies were taken, your doctors clinic will contact you in 1 to 2   weeks with any results.  - The patient will be observed post-procedure, until all discharge criteria   are met.   - Discharge patient to home.   - Resume previous diet.   - Continue present medications.   - Patient has a contact number available for emergencies.  The signs and   symptoms of potential delayed complications were discussed with the   patient.  Return to normal activities tomorrow.  Written discharge   instructions were provided to the patient.   - Consider starting a non-selective beta blocker for primary prophylaxis.  - If beta blocker is not started, repeat upper endoscopy in 1 year for   surveillance.  For questions, problems or results please call your physician - Jose Carlos Villatoro MD at Work:  [phone], Work:  [phone].  [facility], EMERGENCY ROOM PHONE NUMBER: [phone]  IF A COMPLICATION OR EMERGENCY SITUATION ARISES AND YOU ARE UNABLE TO REACH   YOUR PHYSICIAN - GO DIRECTLY TO THE EMERGENCY ROOM.  MD Jose Carlos Sims MD  10/16/2024 10:46:11 AM  This report has been verified and signed electronically.  Dear patient,  As a result of recent federal legislation (The Federal Cures Act), you may   receive lab or pathology results from your procedure in your [facility]   account before your physician is able to contact you. Your physician or   their representative will relay the results to you with  their   recommendations at their soonest availability.  Thank you,  [employer]

## 2024-10-16 NOTE — H&P
Short Stay Endoscopy History and Physical    PCP - Reyna Duval MD     Procedure - EGD  ASA - per anesthesia  Mallampati - per anesthesia  History of Anesthesia problems - no  Family history Anesthesia problems -  no   Plan of anesthesia - per anesthesia    HPI:  This is a 59 y.o. female here for evaluation of: varices      ROS:  Constitutional: No fevers, chills, No weight loss  CV: No chest pain  Pulm: No cough, No shortness of breath  Ophtho: No vision changes  GI: see HPI  Derm: No rash    Medical History:  has a past medical history of Arthritis, Diabetes mellitus, type 2, Essential (primary) hypertension, Fatty liver, Hyperlipidemia, Hypertension, Hyperthyroidism, Hypothyroidism, Psoriasis, and Spleen enlarged.    Surgical History:  has a past surgical history that includes Thyroidectomy, partial; xi robotic hysterectomy, with salpingo-oophorectomy (Bilateral, 9/12/2022); Robot-assisted laparoscopic lysis of adhesions using da Ema Xi (N/A, 9/12/2022); Esophagogastroduodenoscopy (N/A, 11/16/2022); Colonoscopy (N/A, 11/16/2022); and Esophagogastroduodenoscopy (N/A, 10/17/2023).    Family History: family history includes Breast cancer in her cousin, cousin, and sister; Cancer in her brother, mother, and sister; Cirrhosis in her mother; Liver cancer in her mother.    Social History:  reports that she has never smoked. She has never used smokeless tobacco. She reports that she does not currently use alcohol. She reports that she does not currently use drugs.    Review of patient's allergies indicates:  No Known Allergies    Medications:   Medications Prior to Admission   Medication Sig Dispense Refill Last Dose/Taking    calcium carbonate (OS-EDITA) 500 mg calcium (1,250 mg) tablet Take 2 tablets by mouth once daily.   10/15/2024    clobetasol 0.05% (TEMOVATE) 0.05 % Oint Apply topically.   10/15/2024    levothyroxine (SYNTHROID) 125 MCG tablet TAKE 1 TABLET BY MOUTH EVERY DAY 90 tablet 3 10/16/2024  Morning    traMADoL (ULTRAM) 50 mg tablet Take 1 tablet (50 mg total) by mouth every 12 (twelve) hours as needed for Pain. 60 tablet 0 Past Week    triamcinolone acetonide 0.1% (KENALOG) 0.1 % cream APPLY TO AFFECTED AREA TWICE A DAY DO NOT APPLY TO FACE, GROIN OR UNDERARM 80 g 1 10/15/2024    valsartan (DIOVAN) 320 MG tablet TAKE 1 TABLET BY MOUTH EVERY DAY 90 tablet 3 10/16/2024 Morning    atorvastatin (LIPITOR) 40 MG tablet Take 40 mg by mouth once daily.       cycloSPORINE modified, NEORAL, (NEORAL) 50 MG capsule Take 50 mg by mouth 2 (two) times daily.       ergocalciferol (ERGOCALCIFEROL) 50,000 unit Cap Take 1 capsule (50,000 Units total) by mouth every 7 days. 12 capsule 3 10/10/2024    lactulose (CHRONULAC) 20 gram/30 mL Soln Take 15 mLs (10 g total) by mouth 2 (two) times daily as needed. 1000 mL 5 More than a month    pantoprazole (PROTONIX) 40 MG tablet Take 1 tablet (40 mg total) by mouth once daily. 90 tablet 3 10/14/2024    semaglutide (OZEMPIC) 0.25 mg or 0.5 mg (2 mg/3 mL) pen injector Inject 0.5 mg into the skin every 7 days. 3 mL 5 9/9/2024       Physical Exam:    Vital Signs:   Vitals:    10/16/24 1009   BP: (!) 153/85   Pulse:    Resp:    Temp:        General Appearance: Well appearing in no acute distress  Eyes:    No scleral icterus  ENT: Neck supple, Lips, mucosa, and tongue normal; teeth and gums normal  Lungs: CTA anteriorly  Heart:  Regular rate, S1, S2 normal, no murmurs heard.  Abdomen: Soft, non tender, non distended with normal bowel sounds. No hepatosplenomegaly, ascites, or mass.  Extremities: No edema  Skin: No rash    Labs:  Lab Results   Component Value Date    WBC 10.7 09/10/2024    HGB 14.7 09/10/2024    HCT 43.1 09/10/2024     06/18/2024    CHOL 190 06/18/2024    TRIG 93 06/18/2024    HDL 48 06/18/2024    ALT 30 07/22/2024    AST 41 07/22/2024     06/18/2024    K 4.0 06/18/2024     06/18/2024    CREATININE 0.9 06/18/2024    BUN 14 06/18/2024    CO2 23  06/18/2024    TSH 2.250 06/18/2024    INR 1.0 06/18/2024    HGBA1C 5.1 06/18/2024       I have explained the risks and benefits of endoscopy procedures to the patient including but not limited to bleeding, perforation, infection, and death.    She expressed understanding. She was allowed to ask further questions which were answered to her satisfaction.    Jose Carlos Villatoro MD

## 2024-10-16 NOTE — ANESTHESIA PREPROCEDURE EVALUATION
10/16/2024  Parris Guevara is a 59 y.o., female.      Pre-op Assessment    I have reviewed the Patient Summary Reports.     I have reviewed the Nursing Notes. I have reviewed the NPO Status.   I have reviewed the Medications.     Review of Systems  Cardiovascular:     Hypertension                                    Hypertension         Hepatic/GI:      Liver Disease, Hepatitis           Liver Disease, Hepatitis        Musculoskeletal:  Arthritis        Arthritis          Neurological:      Arthritis                           Endocrine:  Diabetes Hypothyroidism   Diabetes                Hyperthyroidism   Hypothyroidism              Physical Exam  General: Well nourished, Alert and Oriented    Airway:  Mallampati: II / II  Mouth Opening: Normal  TM Distance: Normal  Tongue: Normal  Neck ROM: Normal ROM    Dental:  Intact    Chest/Lungs:  Clear to auscultation, Normal Respiratory Rate    Heart:  Rate: Normal  Rhythm: Regular Rhythm  Sounds: Normal    Abdomen:  Normal, Soft        Anesthesia Plan  Type of Anesthesia, risks & benefits discussed:    Anesthesia Type: Gen Natural Airway  Intra-op Monitoring Plan: Standard ASA Monitors  Induction:  IV  Informed Consent: Informed consent signed with the Patient and all parties understand the risks and agree with anesthesia plan.  All questions answered.   ASA Score: 2  Day of Surgery Review of History & Physical: H&P Update referred to the surgeon/provider.I have interviewed and examined the patient. I have reviewed the patient's H&P dated:     Ready For Surgery From Anesthesia Perspective.     .

## 2024-10-16 NOTE — TRANSFER OF CARE
"Anesthesia Transfer of Care Note    Patient: Parris Guevara    Procedure(s) Performed: Procedure(s) (LRB):  EGD (ESOPHAGOGASTRODUODENOSCOPY) (N/A)    Patient location: GI    Anesthesia Type: general    Transport from OR: Transported from OR on room air with adequate spontaneous ventilation    Post pain: adequate analgesia    Post assessment: no apparent anesthetic complications    Post vital signs: stable    Level of consciousness: sedated    Nausea/Vomiting: no nausea/vomiting    Complications: none    Transfer of care protocol was followed      Last vitals: Visit Vitals  BP (!) 153/85   Pulse 89   Temp 36.8 °C (98.2 °F) (Temporal)   Resp 16   Ht 5' 7" (1.702 m)   Wt 95.3 kg (210 lb)   SpO2 98%   BMI 32.89 kg/m²     "

## 2024-10-16 NOTE — ANESTHESIA POSTPROCEDURE EVALUATION
Anesthesia Post Evaluation    Patient: Parris Guevara    Procedure(s) Performed: Procedure(s) (LRB):  EGD (ESOPHAGOGASTRODUODENOSCOPY) (N/A)    Final Anesthesia Type: general      Patient location during evaluation: GI PACU  Patient participation: Yes- Able to Participate  Level of consciousness: awake and alert and oriented  Post-procedure vital signs: reviewed and stable  Pain management: adequate  Airway patency: patent    PONV status at discharge: No PONV  Anesthetic complications: no      Cardiovascular status: blood pressure returned to baseline  Respiratory status: unassisted and spontaneous ventilation  Hydration status: euvolemic  Follow-up not needed.              Vitals Value Taken Time   /68 10/16/24 1112   Temp 36.6 °C (97.9 °F) 10/16/24 1042   Pulse 87 10/16/24 1112   Resp 18 10/16/24 1112   SpO2 99 % 10/16/24 1112         Event Time   Out of Recovery 11:15:45         Pain/Charlie Score: Charlie Score: 9 (10/16/2024 10:43 AM)

## 2024-10-22 ENCOUNTER — LAB VISIT (OUTPATIENT)
Dept: LAB | Facility: HOSPITAL | Age: 59
End: 2024-10-22
Payer: MEDICAID

## 2024-10-22 DIAGNOSIS — K74.60 LIVER CIRRHOSIS SECONDARY TO NASH: ICD-10-CM

## 2024-10-22 DIAGNOSIS — K75.81 LIVER CIRRHOSIS SECONDARY TO NASH: ICD-10-CM

## 2024-10-22 LAB
ALBUMIN SERPL BCP-MCNC: 3.3 G/DL (ref 3.5–5.2)
ALP SERPL-CCNC: 139 U/L (ref 40–150)
ALT SERPL W/O P-5'-P-CCNC: 19 U/L (ref 10–44)
AST SERPL-CCNC: 26 U/L (ref 10–40)
BILIRUB DIRECT SERPL-MCNC: 0.2 MG/DL (ref 0.1–0.3)
BILIRUB SERPL-MCNC: 0.5 MG/DL (ref 0.1–1)
PROT SERPL-MCNC: 8.2 G/DL (ref 6–8.4)

## 2024-10-22 PROCEDURE — 80076 HEPATIC FUNCTION PANEL: CPT | Performed by: NURSE PRACTITIONER

## 2024-10-22 PROCEDURE — 36415 COLL VENOUS BLD VENIPUNCTURE: CPT | Mod: PO | Performed by: NURSE PRACTITIONER

## 2024-11-25 ENCOUNTER — PATIENT OUTREACH (OUTPATIENT)
Dept: INTERNAL MEDICINE | Facility: CLINIC | Age: 59
End: 2024-11-25
Payer: MEDICAID

## 2024-11-25 VITALS — SYSTOLIC BLOOD PRESSURE: 131 MMHG | DIASTOLIC BLOOD PRESSURE: 68 MMHG

## 2024-12-10 ENCOUNTER — HOSPITAL ENCOUNTER (OUTPATIENT)
Dept: RADIOLOGY | Facility: HOSPITAL | Age: 59
Discharge: HOME | End: 2024-12-10
Attending: NURSE PRACTITIONER
Payer: MEDICAID

## 2024-12-10 DIAGNOSIS — K74.60 LIVER CIRRHOSIS SECONDARY TO NASH: ICD-10-CM

## 2024-12-10 DIAGNOSIS — K75.81 LIVER CIRRHOSIS SECONDARY TO NASH: ICD-10-CM

## 2024-12-10 PROCEDURE — 76705 ECHO EXAM OF ABDOMEN: CPT | Mod: TC

## 2024-12-10 PROCEDURE — 76705 ECHO EXAM OF ABDOMEN: CPT | Mod: 26,,, | Performed by: RADIOLOGY

## 2024-12-17 ENCOUNTER — OFFICE VISIT (OUTPATIENT)
Dept: HEPATOLOGY | Facility: CLINIC | Age: 59
End: 2024-12-17
Payer: MEDICAID

## 2024-12-17 VITALS
TEMPERATURE: 98 F | OXYGEN SATURATION: 99 % | BODY MASS INDEX: 33.64 KG/M2 | SYSTOLIC BLOOD PRESSURE: 123 MMHG | WEIGHT: 214.31 LBS | HEIGHT: 67 IN | HEART RATE: 101 BPM | DIASTOLIC BLOOD PRESSURE: 80 MMHG

## 2024-12-17 DIAGNOSIS — I15.2 HYPERTENSION ASSOCIATED WITH TYPE 2 DIABETES MELLITUS: ICD-10-CM

## 2024-12-17 DIAGNOSIS — I85.10 SECONDARY ESOPHAGEAL VARICES WITHOUT BLEEDING: ICD-10-CM

## 2024-12-17 DIAGNOSIS — E78.5 HYPERLIPIDEMIA ASSOCIATED WITH TYPE 2 DIABETES MELLITUS: ICD-10-CM

## 2024-12-17 DIAGNOSIS — E11.69 HYPERLIPIDEMIA ASSOCIATED WITH TYPE 2 DIABETES MELLITUS: ICD-10-CM

## 2024-12-17 DIAGNOSIS — E66.811 CLASS 1 OBESITY DUE TO EXCESS CALORIES WITH SERIOUS COMORBIDITY AND BODY MASS INDEX (BMI) OF 33.0 TO 33.9 IN ADULT: ICD-10-CM

## 2024-12-17 DIAGNOSIS — K76.6 PORTAL HYPERTENSION: ICD-10-CM

## 2024-12-17 DIAGNOSIS — E66.09 CLASS 1 OBESITY DUE TO EXCESS CALORIES WITH SERIOUS COMORBIDITY AND BODY MASS INDEX (BMI) OF 33.0 TO 33.9 IN ADULT: ICD-10-CM

## 2024-12-17 DIAGNOSIS — K74.60 LIVER CIRRHOSIS SECONDARY TO NASH: Primary | ICD-10-CM

## 2024-12-17 DIAGNOSIS — K75.81 LIVER CIRRHOSIS SECONDARY TO NASH: Primary | ICD-10-CM

## 2024-12-17 DIAGNOSIS — E11.9 TYPE 2 DIABETES MELLITUS WITHOUT COMPLICATION, WITHOUT LONG-TERM CURRENT USE OF INSULIN: ICD-10-CM

## 2024-12-17 DIAGNOSIS — E11.59 HYPERTENSION ASSOCIATED WITH TYPE 2 DIABETES MELLITUS: ICD-10-CM

## 2024-12-17 DIAGNOSIS — R74.8 ELEVATED ALKALINE PHOSPHATASE LEVEL: ICD-10-CM

## 2024-12-17 PROCEDURE — 3061F NEG MICROALBUMINURIA REV: CPT | Mod: CPTII,,, | Performed by: NURSE PRACTITIONER

## 2024-12-17 PROCEDURE — 3066F NEPHROPATHY DOC TX: CPT | Mod: CPTII,,, | Performed by: NURSE PRACTITIONER

## 2024-12-17 PROCEDURE — 99999 PR PBB SHADOW E&M-EST. PATIENT-LVL IV: CPT | Mod: PBBFAC,,, | Performed by: NURSE PRACTITIONER

## 2024-12-17 PROCEDURE — 3008F BODY MASS INDEX DOCD: CPT | Mod: CPTII,,, | Performed by: NURSE PRACTITIONER

## 2024-12-17 PROCEDURE — 3079F DIAST BP 80-89 MM HG: CPT | Mod: CPTII,,, | Performed by: NURSE PRACTITIONER

## 2024-12-17 PROCEDURE — 99214 OFFICE O/P EST MOD 30 MIN: CPT | Mod: PBBFAC | Performed by: NURSE PRACTITIONER

## 2024-12-17 PROCEDURE — 4010F ACE/ARB THERAPY RXD/TAKEN: CPT | Mod: CPTII,,, | Performed by: NURSE PRACTITIONER

## 2024-12-17 PROCEDURE — 3072F LOW RISK FOR RETINOPATHY: CPT | Mod: CPTII,,, | Performed by: NURSE PRACTITIONER

## 2024-12-17 PROCEDURE — 1159F MED LIST DOCD IN RCRD: CPT | Mod: CPTII,,, | Performed by: NURSE PRACTITIONER

## 2024-12-17 PROCEDURE — 3044F HG A1C LEVEL LT 7.0%: CPT | Mod: CPTII,,, | Performed by: NURSE PRACTITIONER

## 2024-12-17 PROCEDURE — 3074F SYST BP LT 130 MM HG: CPT | Mod: CPTII,,, | Performed by: NURSE PRACTITIONER

## 2024-12-17 PROCEDURE — 99214 OFFICE O/P EST MOD 30 MIN: CPT | Mod: S$PBB,,, | Performed by: NURSE PRACTITIONER

## 2024-12-17 PROCEDURE — 1160F RVW MEDS BY RX/DR IN RCRD: CPT | Mod: CPTII,,, | Performed by: NURSE PRACTITIONER

## 2024-12-17 RX ORDER — CARVEDILOL 3.12 MG/1
3.12 TABLET ORAL 2 TIMES DAILY
Qty: 60 TABLET | Refills: 3 | Status: SHIPPED | OUTPATIENT
Start: 2024-12-17

## 2024-12-17 RX ORDER — ADALIMUMAB 40MG/0.8ML
40 KIT SUBCUTANEOUS
COMMUNITY

## 2024-12-17 NOTE — PATIENT INSTRUCTIONS
This is a web site that you may find helpful about cirrhosis : [email or web address]    Because you have cirrhosis, it is important to attend clinic visits every 6 months with an Ultrasound and blood tests every 6 months to screen for liver cancer (you are at risk of developing liver cancer due to scar tissue in the liver)    Signs and symptoms of worsening liver disease include jaundice, fluid in the belly (ascites), and confusion/disorientation/slowed thought processes due to hepatic encephalopathy (toxins building up because of liver problems).   You should seek medical attention if any of these things occur.    Also, possible bleeding from esophageal varices (blood vessels in the stomach and foodpipe can burst and cause fatal bleeding).  Therefore, if you have symptoms of vomiting blood, blood in your stool, dark or black stools or vomiting coffee ground vomit, YOU SHOULD GO TO THE EMERGENCY ROOM IMMEDIATELY.     Cirrhosis can increase the risk of liver cancer, liver failure, and death. However, we will watch your liver function score (MELD score) closely with each clinic visit. A normal MELD score is 6, highest is 40. Your last one was an 8. We will check this with every clinic visit. A MELD 15 or higher is when we start to consider transplant because MELD 15 or higher indicates that the liver is not functioning as well     Cirrhosis Counseling  - NO alcohol use (includes beer, wine, and/or liquor)  - avoid non-steroidal anti-inflammatory drugs (NSAIDs) such as ibuprofen, Motrin, naprosyn, Alleve due to the risk of kidney damage  - can take acetaminophen (Tylenol), no more than 2000 mg per day  - low sodium (salt) 2 gram per day diet  - high protein diet: 100 grams per day to prevent muscle mass loss. Drink at least 1 protein shake daily (Premier Protein is best option because it is very high protein and low sugar). Ok to use this as nighttime snack to fit it in   - resistance exercises for muscle  strength  - avoid raw seafoods due to the risk of fatal Vibrio vulnificus infection  - ultrasound of the liver every 6 months for liver cancer screening (you are at risk of developing liver cancer due to scar tissue in the liver)  - Upper endoscopy every 1-2 years to screen for varices in the stomach and foodpipe which can burst and cause fatal bleeding

## 2024-12-17 NOTE — PROGRESS NOTES
[facility] Established Patient Visit    Reason for Visit:  cirrhosis      PCP: Reyna Duval    HPI:  This is a 59 y.o. female with PMH noted below, here for follow up of Well-compensated cirrhosis likely due to metabolic fatty liver versus MTX   Was on MTX for years, stopped ~2022    Mom  from CARTER cirrhosis     RA and PSA followed by rheumatology at [place]    Diagnosis of cirrhosis based on  liver biopsy  2022. No PBC on biopsy     Serological workup was negative for Levar's, alpha-1 antitrypsin deficiency, hemochromatosis, and viral hepatitis B and C    Prior serologic workup:   Lab Results   Component Value Date    FERRITIN 122 2022    FESATURATED 19 (L) 2022    CERULOPLSM 27.0 2022    HEPBSAG Non-reactive 2022    HEPCAB Non-reactive 2022     Risk factors for NAFLD include obesity, HTN, HLD, DM    Interval HPI: Presents today alone. No s/s of hepatic decompensation: no ascites, HE or h/o variceal bleeding.   Previously on Cyclosporine by rheum but changed to Humira recently   Does report some possible memory loss, disorientation at times but ammonia previously WNL, do not suspect HE. On Lactulose from PCP, taking rarely   Liver lesion on MRI previously discussed at IR conference (last seen 2023), US without concerning lesion   On Ozempic 0.5 mg weekly, currently 214 lbs (previously 180s but increase with previous steroids)  EGD with small varices. Per discussion with Dr. Villatoro, BB now recommended for prevention in small varices    Abd US done 2024 showed cirrhosis no lesions, + splenomegaly    Lab Results   Component Value Date    ALT 29 12/10/2024    AST 33 12/10/2024    ALKPHOS 152 (H) 12/10/2024    BILITOT 0.7 12/10/2024    ALBUMIN 3.6 12/10/2024    INR 1.1 12/10/2024     12/10/2024     MELD 3.0: 8 at 12/10/2024  9:33 AM  MELD-Na: 7 at 12/10/2024  9:33 AM  Calculated from:  Serum Creatinine: 1 mg/dL at 12/10/2024  9:33 AM  Serum  "Sodium: 138 mmol/L (Using max of 137 mmol/L) at 12/10/2024  9:33 AM  Total Bilirubin: 0.7 mg/dL (Using min of 1 mg/dL) at 12/10/2024  9:33 AM  Serum Albumin: 3.6 g/dL (Using max of 3.5 g/dL) at 12/10/2024  9:33 AM  INR(ratio): 1.1 at 12/10/2024  9:33 AM  Age at listing (hypothetical): 59 years  Sex: Female at 12/10/2024  9:33 AM  MELD 8    Cirrhosis Health Maintenance:   -- Last  EGD 10/2024 varices, repeat 10/2025 (can potentially stop EGD in the future if stable/improved with goal Coreg dose)  -- Due for next colonoscopy : 2027  -- HCC screening   US without concerning lesion, repeat 6/2025   AFP  WNL, next due 6/2025    Lab Results   Component Value Date    AFP 7.6 12/10/2024       No results found for: "AFPSERUM"    No results found for: "CENO6GDS"      -- Immunity to Hep A and B - completed [employer]     + family history of liver disease: mother with CARTER cirrhosis. Denies current alcohol consumption  or history of significant alcohol consumption in past   Social History     Substance and Sexual Activity   Alcohol Use Not Currently         PMHX:  has a past medical history of Arthritis, Diabetes mellitus, type 2, Essential (primary) hypertension, Fatty liver, Hyperlipidemia, Hypertension, Hyperthyroidism, Hypothyroidism, Psoriasis, and Spleen enlarged.    PSHX:  has a past surgical history that includes Thyroidectomy, partial; xi robotic hysterectomy, with salpingo-oophorectomy (Bilateral, 9/12/2022); Robot-assisted laparoscopic lysis of adhesions using da Ema Xi (N/A, 9/12/2022); Esophagogastroduodenoscopy (N/A, 11/16/2022); Colonoscopy (N/A, 11/16/2022); Esophagogastroduodenoscopy (N/A, 10/17/2023); and Esophagogastroduodenoscopy (N/A, 10/16/2024).    The patient's social and family histories were reviewed by me and updated in the appropriate section of the electronic medical record.    Review of patient's allergies indicates:  No Known Allergies      Current Outpatient Medications on File Prior to Visit "   Medication Sig Dispense Refill    calcium carbonate (OS-EDITA) 500 mg calcium (1,250 mg) tablet Take 2 tablets by mouth once daily.      clobetasol 0.05% (TEMOVATE) 0.05 % Oint Apply topically.      ergocalciferol (ERGOCALCIFEROL) 50,000 unit Cap Take 1 capsule (50,000 Units total) by mouth every 7 days. 12 capsule 3    lactulose (CHRONULAC) 20 gram/30 mL Soln Take 15 mLs (10 g total) by mouth 2 (two) times daily as needed. 1000 mL 5    levothyroxine (SYNTHROID) 125 MCG tablet TAKE 1 TABLET BY MOUTH EVERY DAY 90 tablet 3    pantoprazole (PROTONIX) 40 MG tablet Take 1 tablet (40 mg total) by mouth once daily. 90 tablet 3    semaglutide (OZEMPIC) 0.25 mg or 0.5 mg (2 mg/3 mL) pen injector Inject 0.5 mg into the skin every 7 days. 3 mL 5    valsartan (DIOVAN) 320 MG tablet TAKE 1 TABLET BY MOUTH EVERY DAY 90 tablet 3    atorvastatin (LIPITOR) 40 MG tablet Take 40 mg by mouth once daily. (Patient not taking: Reported on 12/17/2024)      cycloSPORINE modified, NEORAL, (NEORAL) 50 MG capsule Take 50 mg by mouth 2 (two) times daily. (Patient not taking: Reported on 12/17/2024)      traMADoL (ULTRAM) 50 mg tablet Take 1 tablet (50 mg total) by mouth every 12 (twelve) hours as needed for Pain. (Patient not taking: Reported on 12/17/2024) 60 tablet 0    triamcinolone acetonide 0.1% (KENALOG) 0.1 % cream APPLY TO AFFECTED AREA TWICE A DAY DO NOT APPLY TO FACE, GROIN OR UNDERARM (Patient not taking: Reported on 12/17/2024) 80 g 1     No current facility-administered medications on file prior to visit.       SOCIAL HISTORY:   Social History     Substance and Sexual Activity   Alcohol Use Not Currently       Social History     Substance and Sexual Activity   Drug Use Not Currently       ROS:   GENERAL: intermittent fatigue  CARDIOVASCULAR: Denies edema  GI: Denies abdominal pain  SKIN: Denies rash, itching   NEURO: Denies confusion, memory loss, or mood changes    Objective Findings:    PHYSICAL EXAM:   Friendly Black or   "American female, in no acute distress; alert and oriented to person, place and time  VITALS: Ht 5' 7" (1.702 m)   Wt 97.2 kg (214 lb 4.6 oz)   BMI 33.56 kg/m²   EYES: Sclerae anicteric  GI: Soft, non-tender, non-distended. No ascites.  EXTREMITIES:  No edema.  SKIN: Warm and dry. No jaundice. No telangectasias noted. No palmar erythema.  NEURO:  No asterixis.  PSYCH:  Thought and speech pattern appropriate. Behavior normal      EDUCATION:  See instructions discussed with patient in Instructions section of the After Visit Summary       ASSESSMENT & PLAN:  59 y.o. Black or  female with:  1.  Cirrhosis, likely due to CARTER versus MTX (?), well compensated  --- cirrhosis diagnosis based on biopsy  -- MELD 3.0: 8 at 12/10/2024  9:33 AM  MELD-Na: 7 at 12/10/2024  9:33 AM  Calculated from:  Serum Creatinine: 1 mg/dL at 12/10/2024  9:33 AM  Serum Sodium: 138 mmol/L (Using max of 137 mmol/L) at 12/10/2024  9:33 AM  Total Bilirubin: 0.7 mg/dL (Using min of 1 mg/dL) at 12/10/2024  9:33 AM  Serum Albumin: 3.6 g/dL (Using max of 3.5 g/dL) at 12/10/2024  9:33 AM  INR(ratio): 1.1 at 12/10/2024  9:33 AM  Age at listing (hypothetical): 59 years  Sex: Female at 12/10/2024  9:33 AM  -- HCC screening: AFP and abd. U/S.. US without lesions, AFP WNL, next due 6/2025  -- Immunity to Hep A and B - see HPI  --- Serological workup: see HPI  -- Cirrhosis counseling as noted above and discussed with patient    2.  Fatty liver   -- risk factors for fatty liver: obesity, HTN, HLD, DM  Recommend:  1. Weight loss goal of 20 lbs  2. Low carb/sugar, high fiber and protein diet  3. Exercise, 5 days per week, 30 minutes per day, as tolerated  4. Recommend good cholesterol, blood pressure, blood sugar levels   5. Cannot use Rezdiffra with cirrhosis currently     3. Portal hypertension, varices  Start Coreg 3.125 mg BID, pt to send BP and HR readings x1 week to increase dose   -- EGD next due 10/2025, see HPI  -- No Ascites or ankle " edema   -- + Splenomegaly            Follow up in about 6 months (around 6/17/2025). with US and labs a few days before  Orders Placed This Encounter   Procedures    MRI Abdomen W WO Contrast    CBC Without Differential    Comprehensive Metabolic Panel    Protime-INR    Alpha-Fetoprotein and AFP L-3        Thank you for allowing me to participate in the care of Parris Guevara    GABRIEL FieldsC    I spent a total of 30 minutes on the day of the visit.This includes face to face time and non-face to face time preparing to see the patient (eg, review of tests), obtaining and/or reviewing separately obtained history, documenting clinical information in the electronic or other health record, independently interpreting results and communicating results to the patient/family/caregiver, and coordinating care.       CC'ed note to:

## 2024-12-23 DIAGNOSIS — E11.9 TYPE 2 DIABETES MELLITUS WITHOUT COMPLICATION: ICD-10-CM

## 2025-01-07 ENCOUNTER — PATIENT MESSAGE (OUTPATIENT)
Dept: HEPATOLOGY | Facility: CLINIC | Age: 60
End: 2025-01-07
Payer: MEDICAID

## 2025-01-07 DIAGNOSIS — I85.10 SECONDARY ESOPHAGEAL VARICES WITHOUT BLEEDING: ICD-10-CM

## 2025-01-07 DIAGNOSIS — K76.6 PORTAL HYPERTENSION: ICD-10-CM

## 2025-01-14 RX ORDER — CARVEDILOL 6.25 MG/1
6.25 TABLET ORAL 2 TIMES DAILY
Qty: 60 TABLET | Refills: 6 | Status: SHIPPED | OUTPATIENT
Start: 2025-01-14

## 2025-01-17 DIAGNOSIS — E11.9 TYPE 2 DIABETES MELLITUS WITHOUT COMPLICATION, WITHOUT LONG-TERM CURRENT USE OF INSULIN: ICD-10-CM

## 2025-01-17 RX ORDER — SEMAGLUTIDE 0.68 MG/ML
0.5 INJECTION, SOLUTION SUBCUTANEOUS
Qty: 9 ML | Refills: 0 | Status: SHIPPED | OUTPATIENT
Start: 2025-01-17

## 2025-01-17 NOTE — TELEPHONE ENCOUNTER
Refill Routing Note   Medication(s) are not appropriate for processing by [facility] for the following reason(s):        Required labs outdated    [facility] action(s):  Defer               Appointments  past 12m or future 3m with PCP    Date Provider   Last Visit   6/11/2024 Reyna Duval MD   Next Visit   Visit date not found Reyna Duval MD   ED visits in past 90 days: 0        Note composed:3:10 AM 01/17/2025

## 2025-02-18 DIAGNOSIS — Z12.31 OTHER SCREENING MAMMOGRAM: ICD-10-CM

## 2025-02-26 ENCOUNTER — TELEPHONE (OUTPATIENT)
Dept: INTERNAL MEDICINE | Facility: CLINIC | Age: 60
End: 2025-02-26
Payer: MEDICAID

## 2025-02-26 DIAGNOSIS — M25.511 CHRONIC RIGHT SHOULDER PAIN: ICD-10-CM

## 2025-02-26 DIAGNOSIS — M25.552 LEFT HIP PAIN: ICD-10-CM

## 2025-02-26 DIAGNOSIS — M75.101 TEAR OF RIGHT ROTATOR CUFF, UNSPECIFIED TEAR EXTENT, UNSPECIFIED WHETHER TRAUMATIC: Primary | ICD-10-CM

## 2025-02-26 DIAGNOSIS — G89.29 CHRONIC RIGHT SHOULDER PAIN: ICD-10-CM

## 2025-03-18 ENCOUNTER — PATIENT MESSAGE (OUTPATIENT)
Dept: ADMINISTRATIVE | Facility: HOSPITAL | Age: 60
End: 2025-03-18
Payer: MEDICAID

## 2025-03-24 RX ORDER — AMOXICILLIN AND CLAVULANATE POTASSIUM 875; 125 MG/1; MG/1
1 TABLET, FILM COATED ORAL EVERY 12 HOURS
Qty: 14 TABLET | Refills: 0 | Status: SHIPPED | OUTPATIENT
Start: 2025-03-24

## 2025-04-25 ENCOUNTER — LAB VISIT (OUTPATIENT)
Dept: LAB | Facility: HOSPITAL | Age: 60
End: 2025-04-25
Attending: HOSPITALIST
Payer: MEDICAID

## 2025-04-25 DIAGNOSIS — E11.59 HYPERTENSION ASSOCIATED WITH TYPE 2 DIABETES MELLITUS: ICD-10-CM

## 2025-04-25 DIAGNOSIS — Z00.00 ANNUAL PHYSICAL EXAM: ICD-10-CM

## 2025-04-25 DIAGNOSIS — E11.9 TYPE 2 DIABETES MELLITUS WITHOUT COMPLICATION, WITHOUT LONG-TERM CURRENT USE OF INSULIN: ICD-10-CM

## 2025-04-25 DIAGNOSIS — E11.69 HYPERLIPIDEMIA ASSOCIATED WITH TYPE 2 DIABETES MELLITUS: ICD-10-CM

## 2025-04-25 DIAGNOSIS — E78.5 HYPERLIPIDEMIA ASSOCIATED WITH TYPE 2 DIABETES MELLITUS: ICD-10-CM

## 2025-04-25 DIAGNOSIS — I15.2 HYPERTENSION ASSOCIATED WITH TYPE 2 DIABETES MELLITUS: ICD-10-CM

## 2025-04-25 LAB
ABSOLUTE EOSINOPHIL (OHS): 0.14 K/UL
ABSOLUTE MONOCYTE (OHS): 0.57 K/UL (ref 0.3–1)
ABSOLUTE NEUTROPHIL COUNT (OHS): 4.52 K/UL (ref 1.8–7.7)
ALBUMIN SERPL BCP-MCNC: 3.3 G/DL (ref 3.5–5.2)
ALP SERPL-CCNC: 124 UNIT/L (ref 40–150)
ALT SERPL W/O P-5'-P-CCNC: 20 UNIT/L (ref 10–44)
ANION GAP (OHS): 7 MMOL/L (ref 8–16)
AST SERPL-CCNC: 25 UNIT/L (ref 11–45)
BASOPHILS # BLD AUTO: 0.04 K/UL
BASOPHILS NFR BLD AUTO: 0.6 %
BILIRUB SERPL-MCNC: 0.5 MG/DL (ref 0.1–1)
BUN SERPL-MCNC: 14 MG/DL (ref 8–23)
CALCIUM SERPL-MCNC: 9 MG/DL (ref 8.7–10.5)
CHLORIDE SERPL-SCNC: 107 MMOL/L (ref 95–110)
CHOLEST SERPL-MCNC: 208 MG/DL (ref 120–199)
CHOLEST/HDLC SERPL: 4.5 {RATIO} (ref 2–5)
CO2 SERPL-SCNC: 24 MMOL/L (ref 23–29)
CREAT SERPL-MCNC: 0.8 MG/DL (ref 0.5–1.4)
EAG (OHS): 105 MG/DL (ref 68–131)
ERYTHROCYTE [DISTWIDTH] IN BLOOD BY AUTOMATED COUNT: 13.1 % (ref 11.5–14.5)
GFR SERPLBLD CREATININE-BSD FMLA CKD-EPI: >60 ML/MIN/1.73/M2
GLUCOSE SERPL-MCNC: 86 MG/DL (ref 70–110)
HBA1C MFR BLD: 5.3 % (ref 4–5.6)
HCT VFR BLD AUTO: 40.2 % (ref 37–48.5)
HDLC SERPL-MCNC: 46 MG/DL (ref 40–75)
HDLC SERPL: 22.1 % (ref 20–50)
HGB BLD-MCNC: 13.2 GM/DL (ref 12–16)
IMM GRANULOCYTES # BLD AUTO: 0.03 K/UL (ref 0–0.04)
IMM GRANULOCYTES NFR BLD AUTO: 0.4 % (ref 0–0.5)
LDLC SERPL CALC-MCNC: 140.6 MG/DL (ref 63–159)
LYMPHOCYTES # BLD AUTO: 1.72 K/UL (ref 1–4.8)
MCH RBC QN AUTO: 30.8 PG (ref 27–31)
MCHC RBC AUTO-ENTMCNC: 32.8 G/DL (ref 32–36)
MCV RBC AUTO: 94 FL (ref 82–98)
NONHDLC SERPL-MCNC: 162 MG/DL
NUCLEATED RBC (/100WBC) (OHS): 0 /100 WBC
PLATELET # BLD AUTO: 152 K/UL (ref 150–450)
PMV BLD AUTO: 11.9 FL (ref 9.2–12.9)
POTASSIUM SERPL-SCNC: 4.6 MMOL/L (ref 3.5–5.1)
PROT SERPL-MCNC: 8.5 GM/DL (ref 6–8.4)
RBC # BLD AUTO: 4.28 M/UL (ref 4–5.4)
RELATIVE EOSINOPHIL (OHS): 2 %
RELATIVE LYMPHOCYTE (OHS): 24.5 % (ref 18–48)
RELATIVE MONOCYTE (OHS): 8.1 % (ref 4–15)
RELATIVE NEUTROPHIL (OHS): 64.4 % (ref 38–73)
SODIUM SERPL-SCNC: 138 MMOL/L (ref 136–145)
TRIGL SERPL-MCNC: 107 MG/DL (ref 30–150)
TSH SERPL-ACNC: 3.36 UIU/ML (ref 0.4–4)
WBC # BLD AUTO: 7.02 K/UL (ref 3.9–12.7)

## 2025-04-25 PROCEDURE — 36415 COLL VENOUS BLD VENIPUNCTURE: CPT | Mod: PO

## 2025-04-25 PROCEDURE — 85025 COMPLETE CBC W/AUTO DIFF WBC: CPT

## 2025-04-25 PROCEDURE — 80053 COMPREHEN METABOLIC PANEL: CPT

## 2025-04-25 PROCEDURE — 83036 HEMOGLOBIN GLYCOSYLATED A1C: CPT

## 2025-04-25 PROCEDURE — 80061 LIPID PANEL: CPT

## 2025-04-25 PROCEDURE — 84443 ASSAY THYROID STIM HORMONE: CPT

## 2025-04-28 ENCOUNTER — HOSPITAL ENCOUNTER (EMERGENCY)
Facility: HOSPITAL | Age: 60
Discharge: HOME | End: 2025-04-28
Attending: FAMILY MEDICINE
Payer: MEDICAID

## 2025-04-28 VITALS
BODY MASS INDEX: 34.21 KG/M2 | HEART RATE: 94 BPM | RESPIRATION RATE: 16 BRPM | WEIGHT: 218 LBS | DIASTOLIC BLOOD PRESSURE: 100 MMHG | HEIGHT: 67 IN | OXYGEN SATURATION: 96 % | SYSTOLIC BLOOD PRESSURE: 164 MMHG | TEMPERATURE: 100 F

## 2025-04-28 DIAGNOSIS — R52 GENERALIZED BODY ACHES: Primary | ICD-10-CM

## 2025-04-28 DIAGNOSIS — R50.9 FEVER, UNSPECIFIED FEVER CAUSE: ICD-10-CM

## 2025-04-28 LAB
ABSOLUTE EOSINOPHIL (OHS): 0.07 K/UL
ABSOLUTE MONOCYTE (OHS): 0.87 K/UL (ref 0.3–1)
ABSOLUTE NEUTROPHIL COUNT (OHS): 3.86 K/UL (ref 1.8–7.7)
ALBUMIN SERPL BCP-MCNC: 4 G/DL (ref 3.5–5.2)
ALP SERPL-CCNC: 138 UNIT/L (ref 38–126)
ALT SERPL W/O P-5'-P-CCNC: 30 UNIT/L (ref 10–44)
ANION GAP (OHS): 11 MMOL/L (ref 8–16)
APTT PPP: 27.3 SECONDS (ref 21–32)
AST SERPL-CCNC: 37 UNIT/L (ref 15–46)
BACTERIA #/AREA URNS HPF: ABNORMAL /HPF
BASOPHILS # BLD AUTO: 0.03 K/UL
BASOPHILS NFR BLD AUTO: 0.5 %
BILIRUB SERPL-MCNC: 0.8 MG/DL (ref 0.1–1)
BILIRUB UR QL STRIP.AUTO: NEGATIVE
BUN SERPL-MCNC: 15 MG/DL (ref 7–17)
CALCIUM SERPL-MCNC: 9 MG/DL (ref 8.7–10.5)
CHLORIDE SERPL-SCNC: 106 MMOL/L (ref 95–110)
CLARITY UR: CLEAR
CO2 SERPL-SCNC: 23 MMOL/L (ref 23–29)
COLOR UR AUTO: YELLOW
CREAT SERPL-MCNC: 0.9 MG/DL (ref 0.5–1.4)
CRP SERPL-MCNC: 3.67 MG/DL
ERYTHROCYTE [DISTWIDTH] IN BLOOD BY AUTOMATED COUNT: 12.7 % (ref 11.5–14.5)
GFR SERPLBLD CREATININE-BSD FMLA CKD-EPI: >60 ML/MIN/1.73/M2
GLUCOSE SERPL-MCNC: 103 MG/DL (ref 70–110)
GLUCOSE UR QL STRIP: NEGATIVE
HCT VFR BLD AUTO: 38.1 % (ref 37–48.5)
HGB BLD-MCNC: 12.8 GM/DL (ref 12–16)
HGB UR QL STRIP: ABNORMAL
HOLD SPECIMEN: NORMAL
IMM GRANULOCYTES # BLD AUTO: 0.02 K/UL (ref 0–0.04)
IMM GRANULOCYTES NFR BLD AUTO: 0.3 % (ref 0–0.5)
INFLUENZA A MOLECULAR (OHS): NEGATIVE
INFLUENZA B MOLECULAR (OHS): NEGATIVE
INR PPP: 1.1 (ref 0.8–1.2)
KETONES UR QL STRIP: NEGATIVE
LACTATE SERPL-SCNC: 1.1 MMOL/L (ref 0.5–2.2)
LACTATE SERPL-SCNC: 1.3 MMOL/L (ref 0.5–2.2)
LEUKOCYTE ESTERASE UR QL STRIP: ABNORMAL
LYMPHOCYTES # BLD AUTO: 1.05 K/UL (ref 1–4.8)
MCH RBC QN AUTO: 30.6 PG (ref 27–31)
MCHC RBC AUTO-ENTMCNC: 33.6 G/DL (ref 32–36)
MCV RBC AUTO: 91 FL (ref 82–98)
MICROSCOPIC COMMENT: ABNORMAL
NITRITE UR QL STRIP: NEGATIVE
NUCLEATED RBC (/100WBC) (OHS): 0 /100 WBC
OHS QRS DURATION: 80 MS
OHS QTC CALCULATION: 437 MS
PH UR STRIP: 6 [PH]
PLATELET # BLD AUTO: 118 K/UL (ref 150–450)
PMV BLD AUTO: 11.1 FL (ref 9.2–12.9)
POTASSIUM SERPL-SCNC: 4.9 MMOL/L (ref 3.5–5.1)
PROT SERPL-MCNC: 8.7 GM/DL (ref 6–8.4)
PROT UR QL STRIP: ABNORMAL
PROTHROMBIN TIME: 11.6 SECONDS (ref 9–12.5)
RBC # BLD AUTO: 4.18 M/UL (ref 4–5.4)
RBC #/AREA URNS HPF: 20 /HPF (ref 0–4)
RELATIVE EOSINOPHIL (OHS): 1.2 %
RELATIVE LYMPHOCYTE (OHS): 17.8 % (ref 18–48)
RELATIVE MONOCYTE (OHS): 14.7 % (ref 4–15)
RELATIVE NEUTROPHIL (OHS): 65.5 % (ref 38–73)
SODIUM SERPL-SCNC: 140 MMOL/L (ref 136–145)
SP GR UR STRIP: 1.02
UROBILINOGEN UR STRIP-ACNC: 1 EU/DL
WBC # BLD AUTO: 5.9 K/UL (ref 3.9–12.7)
WBC #/AREA URNS HPF: 9 /HPF (ref 0–5)
YEAST URNS QL MICRO: ABNORMAL /HPF

## 2025-04-28 PROCEDURE — 87502 INFLUENZA DNA AMP PROBE: CPT | Mod: ER | Performed by: FAMILY MEDICINE

## 2025-04-28 PROCEDURE — 85610 PROTHROMBIN TIME: CPT | Mod: ER | Performed by: FAMILY MEDICINE

## 2025-04-28 PROCEDURE — 93005 ELECTROCARDIOGRAM TRACING: CPT | Mod: ER

## 2025-04-28 PROCEDURE — 83605 ASSAY OF LACTIC ACID: CPT | Mod: ER | Performed by: FAMILY MEDICINE

## 2025-04-28 PROCEDURE — 87040 BLOOD CULTURE FOR BACTERIA: CPT | Mod: ER | Performed by: FAMILY MEDICINE

## 2025-04-28 PROCEDURE — 63600175 PHARM REV CODE 636 W HCPCS: Mod: ER | Performed by: FAMILY MEDICINE

## 2025-04-28 PROCEDURE — 81001 URINALYSIS AUTO W/SCOPE: CPT | Mod: ER | Performed by: FAMILY MEDICINE

## 2025-04-28 PROCEDURE — 85730 THROMBOPLASTIN TIME PARTIAL: CPT | Mod: ER | Performed by: FAMILY MEDICINE

## 2025-04-28 PROCEDURE — 93010 ELECTROCARDIOGRAM REPORT: CPT | Mod: ,,, | Performed by: STUDENT IN AN ORGANIZED HEALTH CARE EDUCATION/TRAINING PROGRAM

## 2025-04-28 PROCEDURE — 96374 THER/PROPH/DIAG INJ IV PUSH: CPT | Mod: ER

## 2025-04-28 PROCEDURE — 86140 C-REACTIVE PROTEIN: CPT | Mod: ER | Performed by: FAMILY MEDICINE

## 2025-04-28 PROCEDURE — 99285 EMERGENCY DEPT VISIT HI MDM: CPT | Mod: 25,ER

## 2025-04-28 PROCEDURE — 82040 ASSAY OF SERUM ALBUMIN: CPT | Mod: ER | Performed by: FAMILY MEDICINE

## 2025-04-28 PROCEDURE — 85025 COMPLETE CBC W/AUTO DIFF WBC: CPT | Mod: ER | Performed by: FAMILY MEDICINE

## 2025-04-28 PROCEDURE — 94761 N-INVAS EAR/PLS OXIMETRY MLT: CPT | Mod: ER

## 2025-04-28 PROCEDURE — 99900035 HC TECH TIME PER 15 MIN (STAT): Mod: ER

## 2025-04-28 RX ORDER — CEFTRIAXONE 1 G/1
1 INJECTION, POWDER, FOR SOLUTION INTRAMUSCULAR; INTRAVENOUS
Status: COMPLETED | OUTPATIENT
Start: 2025-04-28 | End: 2025-04-28

## 2025-04-28 RX ORDER — FAMOTIDINE 10 MG/ML
20 INJECTION, SOLUTION INTRAVENOUS
Status: DISCONTINUED | OUTPATIENT
Start: 2025-04-28 | End: 2025-04-28

## 2025-04-28 RX ADMIN — CEFTRIAXONE SODIUM 1 G: 1 INJECTION, POWDER, FOR SOLUTION INTRAMUSCULAR; INTRAVENOUS at 04:04

## 2025-04-28 NOTE — ED PROVIDER NOTES
"Encounter Date: 4/28/2025       History     Chief Complaint   Patient presents with    Generalized Body Aches     Pt reports " I am hurting all over for the past week". Pt reports head feels "heavy" and "my coordination is off". Pt reports "soreness in stomach"     59-year-old female presents to ED with multiple complaints.  Patient says she has been having generalized body aches from her arthritis and myalgia.  Last week her symptoms have been getting worse with pain.  She also complains of headache, nausea.  Generalized abdominal fullness.  Especially in epigastric area across both sides.  No vomiting.  Patient claims she does feel like foggy, dizziness and sometimes imbalance.  Patient claims she had fever last night around 102.  Five.  She took Tylenol.  Patient has cirrhosis of liver and enlarged spleen.  For which she limits her Tylenol.  Patient claims she easily bleeds secondary to her liver disease.      The history is provided by the patient.     Review of patient's allergies indicates:  No Known Allergies  Past Medical History:   Diagnosis Date    Arthritis     Diabetes mellitus, type 2     Essential (primary) hypertension     Fatty liver     Hyperlipidemia     Hypertension     Hyperthyroidism     Hypothyroidism     Psoriasis     Spleen enlarged      Past Surgical History:   Procedure Laterality Date    COLONOSCOPY N/A 11/16/2022    Procedure: COLONOSCOPY;  Surgeon: Obed Duran MD;  Location: [address]);  Service: Endoscopy;  Laterality: N/A;  per referral entered by Dr. Reyna Duval for screening colonosocpy to be scheduled with EGD/ EGD and colon combined-[facility]  Labs last on 10/19/22 and 11/1/22-[facility]  prep ins. on portal - [facility]    ESOPHAGOGASTRODUODENOSCOPY N/A 11/16/2022    Procedure: ESOPHAGOGASTRODUODENOSCOPY (EGD);  Surgeon: Obed Duran MD;  Location: [address]);  Service: Endoscopy;  Laterality: N/A;    ESOPHAGOGASTRODUODENOSCOPY N/A 10/17/2023    Procedure: EGD " (ESOPHAGOGASTRODUODENOSCOPY);  Surgeon: Birgit Peralta MD;  Location: [facility] (4TH FLR);  Service: Endoscopy;  Laterality: N/A;  Ins. to portal. Doabetic takes Ozempic. Cirrhosis: labs ordered. Tbou  referral: Babak BECKHAM  10/10-precall complete-pt verbalized understandingof holding Ozempic-MS    ESOPHAGOGASTRODUODENOSCOPY N/A 10/16/2024    Procedure: EGD (ESOPHAGOGASTRODUODENOSCOPY);  Surgeon: Jose Carlos Villatoro MD;  Location: [facility] (4TH FLR);  Service: Gastroenterology;  Laterality: N/A;  Ref by CHAPINCITO Hilliard, Labs am procedure, Ozempic, portal - PC  10/9 precall complete, labs at 0930, pt states she's been off Ozempic in 2 months, portal-ml    ROBOT-ASSISTED LAPAROSCOPIC LYSIS OF ADHESIONS USING DA REED XI N/A 9/12/2022    Procedure: XI ROBOTIC LYSIS, ADHESIONS;  Surgeon: Silvestre Umaña MD;  Location: [facility];  Service: Oncology;  Laterality: N/A;    THYROIDECTOMY, PARTIAL      XI ROBOTIC HYSTERECTOMY, WITH SALPINGO-OOPHORECTOMY Bilateral 9/12/2022    Procedure: XI ROBOTIC HYSTERECTOMY,WITH SALPINGO-OOPHORECTOMY;  Surgeon: Silvestre Umaña MD;  Location: [facility];  Service: Oncology;  Laterality: Bilateral;     Family History   Problem Relation Name Age of Onset    Cancer Mother          liver    Liver cancer Mother      Cirrhosis Mother          CARTER    Cancer Sister          breast    Breast cancer Sister      Cancer Brother      Breast cancer Cousin      Breast cancer Cousin      Colon cancer Neg Hx      Ovarian cancer Neg Hx       Social History[1]  Review of Systems   Constitutional:  Positive for chills and fever. Negative for activity change and appetite change.   HENT:  Negative for congestion, nosebleeds, postnasal drip, rhinorrhea and sore throat.    Eyes:  Negative for pain, discharge, redness and itching.   Respiratory:  Positive for shortness of breath. Negative for cough and wheezing.    Cardiovascular:  Negative for chest pain.   Gastrointestinal:  Positive for abdominal pain and nausea.  Negative for abdominal distention, blood in stool, diarrhea and vomiting.   Genitourinary:  Negative for dysuria and frequency.   Musculoskeletal:  Positive for arthralgias, myalgias and neck pain.   Skin:  Negative for rash.   Neurological:  Positive for dizziness and headaches. Negative for tremors, seizures, syncope, facial asymmetry, speech difficulty and numbness.   All other systems reviewed and are negative.      Physical Exam     Initial Vitals [04/28/25 1132]   BP Pulse Resp Temp SpO2   (!) 164/100 102 19 99.9 °F (37.7 °C) 98 %      MAP       --         Physical Exam    Nursing note and vitals reviewed.  Constitutional: Vital signs are normal. She appears well-developed and well-nourished. She is active. No distress.   HENT:   Head: Normocephalic.   Right Ear: Tympanic membrane and external ear normal.   Left Ear: Tympanic membrane and external ear normal.   Nose: Nose normal. No rhinorrhea. Mouth/Throat: Uvula is midline, oropharynx is clear and moist and mucous membranes are normal. No posterior oropharyngeal erythema.   Eyes: Conjunctivae, EOM and lids are normal. Pupils are equal, round, and reactive to light.   Neck: Neck supple. No Kernig's sign noted.   Patient claims she has some neck issues with limitation in range of movements.  Unable to perform Brudzinski's sign.  Head shake test is negative.   Cardiovascular:  Normal rate, regular rhythm, S1 normal, S2 normal and normal heart sounds.           Pulmonary/Chest: Breath sounds normal. No accessory muscle usage. No respiratory distress. She has no wheezes. She has no rhonchi. She has no rales.   Abdominal: Abdomen is soft. She exhibits no distension and no mass. There is no abdominal tenderness. There is no rebound and no guarding.   Musculoskeletal:      Right upper arm: Normal.      Left upper arm: Normal.      Cervical back: Neck supple. No edema or erythema. Muscular tenderness present. No spinous process tenderness. Decreased range of motion.       Right lower leg: Normal.      Left lower leg: Normal.      Comments: SLR negative, Kernig sign negative     Lymphadenopathy:     She has no cervical adenopathy.   Neurological: She is alert and oriented to person, place, and time. She has normal strength. No sensory deficit. GCS score is 15. GCS eye subscore is 4. GCS verbal subscore is 5. GCS motor subscore is 6.   Skin: Skin is warm. Capillary refill takes less than 2 seconds.   Psychiatric: She has a normal mood and affect. Her speech is normal and behavior is normal. Thought content normal. Cognition and memory are normal.         ED Course   Procedures  Labs Reviewed   COMPREHENSIVE METABOLIC PANEL - Abnormal       Result Value    Sodium 140      Potassium 4.9      Chloride 106      CO2 23      Glucose 103      BUN 15      Creatinine 0.9      Calcium 9.0      Protein Total 8.7 (*)     Albumin 4.0      Bilirubin Total 0.8       (*)     AST 37      ALT 30      Anion Gap 11      eGFR >60     URINALYSIS, REFLEX TO URINE CULTURE - Abnormal    Color, UA Yellow      Appearance, UA Clear      pH, UA 6.0      Spec Grav UA 1.020      Protein, UA Trace (*)     Glucose, UA Negative      Ketones, UA Negative      Bilirubin, UA Negative      Blood, UA 3+ (*)     Nitrites, UA Negative      Urobilinogen, UA 1.0      Leukocyte Esterase, UA Trace (*)    CBC WITH DIFFERENTIAL - Abnormal    WBC 5.90      RBC 4.18      HGB 12.8      HCT 38.1      MCV 91      MCH 30.6      MCHC 33.6      RDW 12.7      Platelet Count 118 (*)     MPV 11.1      Nucleated RBC 0      Neut % 65.5      Lymph % 17.8 (*)     Mono % 14.7      Eos % 1.2      Basophil % 0.5      Imm Grans % 0.3      Neut # 3.86      Lymph # 1.05      Mono # 0.87      Eos # 0.07      Baso # 0.03      Imm Grans # 0.02     URINALYSIS MICROSCOPIC - Abnormal    RBC, UA 20 (*)     WBC, UA 9 (*)     Bacteria, UA Occasional      Yeast, UA Few (*)     Microscopic Comment       C-REACTIVE PROTEIN - Abnormal    CRP 3.67 (*)     INFLUENZA A & B BY MOLECULAR - Normal    INFLUENZA A MOLECULAR Negative      INFLUENZA B MOLECULAR  Negative     LACTIC ACID, PLASMA - Normal    Lactic Acid Level 1.1      Narrative:     Falsely low lactic acid results can be found in samples containing >=13.0 mg/dL total bilirubin and/or >=3.5 mg/dL direct bilirubin.    APTT - Normal    PTT 27.3     PROTIME-INR - Normal    PT 11.6      INR 1.1     CULTURE, BLOOD   CULTURE, BLOOD   CBC W/ AUTO DIFFERENTIAL    Narrative:     The following orders were created for panel order CBC auto differential.  Procedure                               Abnormality         Status                     ---------                               -----------         ------                     CBC with Differential[[id number]]       Abnormal            Final result                 Please view results for these tests on the individual orders.   GREY TOP URINE HOLD    Extra Tube Hold for add-ons.     LACTIC ACID, PLASMA     EKG Readings: (Independently Interpreted)   Initial Reading: No STEMI. Rhythm: Normal Sinus Rhythm. Heart Rate: 95. Ectopy: No Ectopy. Conduction: Normal. ST Segments: Normal ST Segments. T Waves: Normal. Q Waves: III, V2 and V3. Clinical Impression: Normal Sinus Rhythm     ECG Results              EKG 12-lead (In process)        Collection Time Result Time QRS Duration [facility] QTC Calculation    04/28/25 12:15:46 04/28/25 13:41:11 80 437                     In process by Interface, Lab In [facility] (04/28/25 13:41:20)                   Narrative:    Test Reason : Z13.6,    Vent. Rate :  95 BPM     Atrial Rate :  95 BPM     P-R Int : 180 ms          QRS Dur :  80 ms      QT Int : 348 ms       P-R-T Axes :  51  54  48 degrees    QTcB Int : 437 ms    Normal sinus rhythm  Cannot rule out Anterior infarct ,age undetermined  Abnormal ECG  When compared with ECG of 11-Jun-2024 10:52,  No significant change was found    Referred By: AAAREFERRAL SELF           Confirmed By:                        In process by Interface, Lab In [facility] (04/28/25 13:40:53)                   Narrative:    Test Reason : Z13.6,    Vent. Rate :  95 BPM     Atrial Rate :  95 BPM     P-R Int : 180 ms          QRS Dur :  80 ms      QT Int : 348 ms       P-R-T Axes :  51  54  48 degrees    QTcB Int : 437 ms    Normal sinus rhythm  Cannot rule out Anterior infarct ,age undetermined  Abnormal ECG  When compared with ECG of 11-Jun-2024 10:52,  No significant change was found    Referred By: AAAREFERRAL SELF           Confirmed By:                                   Imaging Results              CT Head Without Contrast (Final result)  Result time 04/28/25 12:51:33      Final result by Olman Wallace MD (04/28/25 12:51:33)                   Impression:      No acute findings.      Electronically signed by: Olman Wallace MD  Date:    04/28/2025  Time:    12:51               Narrative:    EXAMINATION:  CT HEAD WITHOUT CONTRAST    CLINICAL HISTORY:  Headache, new or worsening (Age >= 50y);    TECHNIQUE:  Standard noncontrast CT of the brain.    All CT scans at this facility are performed  using dose modulation techniques as appropriate to performed exam including the following:  automated exposure control; adjustment of mA and/or kV according to the patients size (this includes techniques or standardized protocols for targeted exams where dose is matched to indication/reason for exam: i.e. extremities or head);  iterative reconstruction technique.    COMPARISON:  MRI January 12, 2024    FINDINGS:  Brain: The ventricles are nonenlarged.  No acute hemorrhage, edema or mass effect is identified.    Skull: The skull is grossly normal.                                       X-Ray Chest AP Portable (Final result)  Result time 04/28/25 16:15:55      Final result by Mikey Dupree MD (04/28/25 16:15:55)                   Impression:      No acute finding in the chest.      Electronically signed by: Mikey  Zack  Date:    04/28/2025  Time:    16:15               Narrative:    EXAMINATION:  XR CHEST AP PORTABLE    CLINICAL HISTORY:  Sepsis;    FINDINGS:  Comparison: None available.    Mediastinal silhouette is within normal limits.  The lungs are clear.  No pneumothorax or pleural effusion.  No acute osseous finding.  James right shoulder replacement.                                       Medications   cefTRIAXone injection 1 g (has no administration in time range)     Medical Decision Making  Differential diagnosis include not limited to:  Occult infection, sepsis of unknown origin, URI, pneumonia, meningitis, arthritis, Gonzalez,  UTI,    Initiated septic workup.- CT of head, EKG.  Chest x-ray.  Chest x-ray no acute findings  CT of head no acute findings  EKG normal sinus rhythm.  Labs reviewed white cell count 5.9, hemoglobin 12.8, INR 1.1, platelet 118.  .  Electrolytes stable.  Blood culture sent.  Urinalysis with WBC 9 and yeast few.  RBC 20.  Lactic acid 1.1.  Unable to identify source.  CRP level 3.67.  Temperature 99.8° in ED.  Extensive discussion with patient.-   Started Rocephin in ED along with prescription for Augmentin.  Patient would like to follow up with her PCP and gastroenterology.  Admission has been offered but she declined today .      Amount and/or Complexity of Data Reviewed  External Data Reviewed: labs.     Details: Intermittent low platelet count in the past.  Normal coags.  Alk-phos sometimes elevated..    Labs: ordered. Decision-making details documented in ED Course.  Radiology: ordered.    Risk  Prescription drug management.                                      Clinical Impression:  Final diagnoses:  [Z13.6] Screening for cardiovascular condition  [R52] Generalized body aches (Primary)  [R50.9] Fever, unspecified fever cause                     [1]   Social History  Tobacco Use    Smoking status: Never    Smokeless tobacco: Never   Substance Use Topics    Alcohol use: Not Currently     Drug use: Not Currently        Prabhakar Adam MD  04/29/25 [id number]

## 2025-04-29 ENCOUNTER — OFFICE VISIT (OUTPATIENT)
Dept: INTERNAL MEDICINE | Facility: CLINIC | Age: 60
End: 2025-04-29
Payer: MEDICAID

## 2025-04-29 VITALS
WEIGHT: 219.81 LBS | HEART RATE: 94 BPM | BODY MASS INDEX: 34.5 KG/M2 | DIASTOLIC BLOOD PRESSURE: 78 MMHG | TEMPERATURE: 98 F | OXYGEN SATURATION: 97 % | RESPIRATION RATE: 19 BRPM | SYSTOLIC BLOOD PRESSURE: 121 MMHG | HEIGHT: 67 IN

## 2025-04-29 DIAGNOSIS — E11.9 TYPE 2 DIABETES MELLITUS WITHOUT COMPLICATION, WITHOUT LONG-TERM CURRENT USE OF INSULIN: Primary | ICD-10-CM

## 2025-04-29 DIAGNOSIS — E11.69 HYPERLIPIDEMIA ASSOCIATED WITH TYPE 2 DIABETES MELLITUS: ICD-10-CM

## 2025-04-29 DIAGNOSIS — E11.59 HYPERTENSION ASSOCIATED WITH TYPE 2 DIABETES MELLITUS: ICD-10-CM

## 2025-04-29 DIAGNOSIS — R06.02 SOBOE (SHORTNESS OF BREATH ON EXERTION): ICD-10-CM

## 2025-04-29 DIAGNOSIS — E78.5 HYPERLIPIDEMIA ASSOCIATED WITH TYPE 2 DIABETES MELLITUS: ICD-10-CM

## 2025-04-29 DIAGNOSIS — N39.0 URINARY TRACT INFECTION WITH HEMATURIA, SITE UNSPECIFIED: ICD-10-CM

## 2025-04-29 DIAGNOSIS — E66.09 CLASS 1 OBESITY DUE TO EXCESS CALORIES WITH SERIOUS COMORBIDITY AND BODY MASS INDEX (BMI) OF 34.0 TO 34.9 IN ADULT: ICD-10-CM

## 2025-04-29 DIAGNOSIS — Z12.31 ENCOUNTER FOR SCREENING MAMMOGRAM FOR BREAST CANCER: ICD-10-CM

## 2025-04-29 DIAGNOSIS — E89.0 POSTOPERATIVE HYPOTHYROIDISM: ICD-10-CM

## 2025-04-29 DIAGNOSIS — E66.811 CLASS 1 OBESITY DUE TO EXCESS CALORIES WITH SERIOUS COMORBIDITY AND BODY MASS INDEX (BMI) OF 34.0 TO 34.9 IN ADULT: ICD-10-CM

## 2025-04-29 DIAGNOSIS — R31.9 URINARY TRACT INFECTION WITH HEMATURIA, SITE UNSPECIFIED: ICD-10-CM

## 2025-04-29 DIAGNOSIS — I15.2 HYPERTENSION ASSOCIATED WITH TYPE 2 DIABETES MELLITUS: ICD-10-CM

## 2025-04-29 PROCEDURE — 99999 PR PBB SHADOW E&M-EST. PATIENT-LVL IV: CPT | Mod: PBBFAC,,, | Performed by: HOSPITALIST

## 2025-04-29 PROCEDURE — G2211 COMPLEX E/M VISIT ADD ON: HCPCS | Mod: S$PBB,,, | Performed by: HOSPITALIST

## 2025-04-29 PROCEDURE — 4010F ACE/ARB THERAPY RXD/TAKEN: CPT | Mod: CPTII,,, | Performed by: HOSPITALIST

## 2025-04-29 PROCEDURE — 99215 OFFICE O/P EST HI 40 MIN: CPT | Mod: S$PBB,,, | Performed by: HOSPITALIST

## 2025-04-29 PROCEDURE — 3061F NEG MICROALBUMINURIA REV: CPT | Mod: CPTII,,, | Performed by: HOSPITALIST

## 2025-04-29 PROCEDURE — 3044F HG A1C LEVEL LT 7.0%: CPT | Mod: CPTII,,, | Performed by: HOSPITALIST

## 2025-04-29 PROCEDURE — 1160F RVW MEDS BY RX/DR IN RCRD: CPT | Mod: CPTII,,, | Performed by: HOSPITALIST

## 2025-04-29 PROCEDURE — 3078F DIAST BP <80 MM HG: CPT | Mod: CPTII,,, | Performed by: HOSPITALIST

## 2025-04-29 PROCEDURE — 3074F SYST BP LT 130 MM HG: CPT | Mod: CPTII,,, | Performed by: HOSPITALIST

## 2025-04-29 PROCEDURE — 3066F NEPHROPATHY DOC TX: CPT | Mod: CPTII,,, | Performed by: HOSPITALIST

## 2025-04-29 PROCEDURE — 3008F BODY MASS INDEX DOCD: CPT | Mod: CPTII,,, | Performed by: HOSPITALIST

## 2025-04-29 PROCEDURE — 99214 OFFICE O/P EST MOD 30 MIN: CPT | Mod: PBBFAC,PO | Performed by: HOSPITALIST

## 2025-04-29 PROCEDURE — 1159F MED LIST DOCD IN RCRD: CPT | Mod: CPTII,,, | Performed by: HOSPITALIST

## 2025-04-29 RX ORDER — SEMAGLUTIDE 0.68 MG/ML
0.5 INJECTION, SOLUTION SUBCUTANEOUS
Qty: 9 ML | Refills: 2 | Status: SHIPPED | OUTPATIENT
Start: 2025-04-29

## 2025-04-29 RX ORDER — AMOXICILLIN AND CLAVULANATE POTASSIUM 875; 125 MG/1; MG/1
1 TABLET, FILM COATED ORAL EVERY 12 HOURS
Qty: 12 TABLET | Refills: 0 | Status: SHIPPED | OUTPATIENT
Start: 2025-04-29 | End: 2025-05-05

## 2025-04-29 NOTE — PROGRESS NOTES
"Subjective:     @Patient ID: Parris Guevara is a 59 y.o. female.    Chief Complaint: Follow-up (Went to ER yesterday, lethargy, dizziness)    HPI    58 yo F with HTN, DM2, HLD, psoriatic arthritis, Grave's disease hypothyroidism s/p partial thyroidectomy at age 11 yo, presents for ER f/u and f/u of chronic conditions:      Pt seen in ER yesterday fevers, body aches, soboe and HA. Reports symptoms started 2-3 days ago.  In ER, given IV rocephin for possible infection. Did have urine in blood and crp level elevated. Not sent home with abx. Blood cultures so far are negative. CT head was negative. CXR wnl. EKG showed " Normal sinus rhythm   Cannot rule out Anterior infarct ,age undetermined   Abnormal ECG   When compared with ECG of 2024 10:52,   No significant change was found "    Pt reports feeling a bit better today since ER f/u. Does not have urinary symptoms currently but unclear why there was blood in her urine.        1. DM2: last a1c 5.3; On ozempic 0.5 mg weekly.   2. HTN:  valsartan 320 mg qday.    3. HLD: atorvastatin 40 mg qday  4. Ovarian mass: diagnosed on CT scan in ER. Also had ultrasound. Seen on  with GYN. Underwent s/p hysterectomy and BSO. Path showed ROSALBA I otherwise no malignancy  5. 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  Pt reports her mother  of CARTER cirrhosis, liver cancer. She is now following with Hepatology clinic   5. Psoriatic arthritis and PMR.  Follows with rheumatology at [facility] Dr Thao Haynes.  no longer on MTX as possibly contributed to liver cirrhosis.   6. Hypothyroidism s/p partial thyroidectomy: on synthroid 137 mcg qday  7. Obesity: pt limited with activity with hx of arthritis   8. Gallstones:CT scan on 7/15 showed small calcified gallstones   9. Dysphagia: had EGD 2022   10. Palpitations: reports intermittent palpitations over the years;    11. Chronic L hip pain: has upcoming appt with ortho.  12. R shoulder pain s/p surgery: continues to " "have chronic pain. Not currently in PT. Plans to f/u with ortho     Review of Systems   Musculoskeletal:  Positive for arthralgias.   Psychiatric/Behavioral:  Negative for agitation and confusion.      Past medical history, surgical history, and family medical history reviewed and updated as appropriate.    Medications and allergies reviewed.     Objective:     Vitals:    04/29/25 1011   BP: 121/78   BP Location: Left arm   Patient Position: Sitting   Pulse: 94   Resp: 19   Temp: 97.7 °F (36.5 °C)   SpO2: 97%   Weight: 99.7 kg (219 lb 12.8 oz)   Height: 5' 7" (1.702 m)     There is no height or weight on file to calculate BMI.  Physical Exam  Vitals reviewed.   Constitutional:       General: She is not in acute distress.     Appearance: Normal appearance. She is well-developed.   HENT:      Head: Normocephalic and atraumatic.      Right Ear: Tympanic membrane normal.      Left Ear: Tympanic membrane normal.      Mouth/Throat:      Mouth: Mucous membranes are moist.      Pharynx: No oropharyngeal exudate.   Eyes:      General:         Right eye: No discharge.         Left eye: No discharge.      Conjunctiva/sclera: Conjunctivae normal.   Cardiovascular:      Rate and Rhythm: Normal rate and regular rhythm.      Heart sounds: No murmur heard.     No friction rub.   Pulmonary:      Effort: Pulmonary effort is normal.      Breath sounds: Normal breath sounds.   Abdominal:      General: Bowel sounds are normal. There is no distension.      Palpations: Abdomen is soft.      Tenderness: There is no abdominal tenderness. There is no guarding.   Musculoskeletal:      Cervical back: Normal range of motion and neck supple.      Right lower leg: No edema.      Left lower leg: No edema.      Comments: Limited ROM of RUE unable to raise above head    Lymphadenopathy:      Cervical: No cervical adenopathy.   Skin:     General: Skin is warm and dry.   Neurological:      Mental Status: She is alert and oriented to person, place, and " time.   Psychiatric:         Mood and Affect: Mood normal.         Behavior: Behavior normal.         Lab Results   Component Value Date    WBC 5.90 04/28/2025    HGB 12.8 04/28/2025    HCT 38.1 04/28/2025     (L) 04/28/2025    CHOL 208 (H) 04/25/2025    TRIG 107 04/25/2025    HDL 46 04/25/2025    ALT 30 04/28/2025    AST 37 04/28/2025     04/28/2025    K 4.9 04/28/2025     04/28/2025    CREATININE 0.9 04/28/2025    BUN 15 04/28/2025    CO2 23 04/28/2025    TSH 3.357 04/25/2025    INR 1.1 04/28/2025    HGBA1C 5.3 04/25/2025       Assessment:     1. Type 2 diabetes mellitus without complication, without long-term current use of insulin    2. Hypertension associated with type 2 diabetes mellitus    3. Hyperlipidemia associated with type 2 diabetes mellitus    4. Postoperative hypothyroidism    5. Encounter for screening mammogram for breast cancer    6. Urinary tract infection with hematuria, site unspecified    7. SOBOE (shortness of breath on exertion)    8. Class 1 obesity due to excess calories with serious comorbidity and body mass index (BMI) of 34.0 to 34.9 in adult      Plan:   Parris was seen today for follow-up.    Diagnoses and all orders for this visit:    Type 2 diabetes mellitus without complication, without long-term current use of insulin  - Stable. Continue home meds   -     Hemoglobin A1C; Future  -     Comprehensive Metabolic Panel; Future  -     Lipid Panel; Future  -     Microalbumin/Creatinine Ratio, Urine; Future  -     semaglutide (OZEMPIC) 0.25 mg or 0.5 mg (2 mg/3 mL) pen injector; Inject 0.5 mg into the skin every 7 days.  -     Ambulatory referral/consult to Optometry; Future    Hypertension associated with type 2 diabetes mellitus  - Stable. Continue home meds     Hyperlipidemia associated with type 2 diabetes mellitus  Non on statin 2/2 to liver cirrhosis  Pt will discuss with hepatology rx options for cholesterol.   Encouraged low cholesterol diet     Postoperative  hypothyroidism  -     Hemoglobin A1C; Future  -     Comprehensive Metabolic Panel; Future  -     Lipid Panel; Future  -     Microalbumin/Creatinine Ratio, Urine; Future    Encounter for screening mammogram for breast cancer  -     Mammo Digital Screening Bilat w/ Jose (XPD); Future    Urinary tract infection with hematuria, site unspecified  -     amoxicillin-clavulanate 875-125mg (AUGMENTIN) 875-125 mg per tablet; Take 1 tablet by mouth every 12 (twelve) hours. for 6 days  -     Urine culture; Future  -     Urinalysis; Future    SOBOE (shortness of breath on exertion)  - new. Check 2d echo   -     Echo; Future    Class 1 obesity due to excess calories with serious comorbidity and BMI 34 to 34.9, adult   - chronic. Encouraged healthy diet         Reyna Duval MD  Internal Medicine    4/29/2025    Visit time 40 min. Includes pre-charting, face-to-face encounter, medical decision making and documentation.

## 2025-04-30 DIAGNOSIS — M25.552 LEFT HIP PAIN: Primary | ICD-10-CM

## 2025-05-02 ENCOUNTER — HOSPITAL ENCOUNTER (EMERGENCY)
Facility: HOSPITAL | Age: 60
Discharge: HOME | End: 2025-05-02
Attending: EMERGENCY MEDICINE
Payer: MEDICAID

## 2025-05-02 ENCOUNTER — RESULTS FOLLOW-UP (OUTPATIENT)
Dept: EMERGENCY MEDICINE | Facility: HOSPITAL | Age: 60
End: 2025-05-02

## 2025-05-02 VITALS
OXYGEN SATURATION: 99 % | DIASTOLIC BLOOD PRESSURE: 73 MMHG | SYSTOLIC BLOOD PRESSURE: 142 MMHG | HEART RATE: 80 BPM | HEIGHT: 67 IN | BODY MASS INDEX: 34.21 KG/M2 | TEMPERATURE: 99 F | WEIGHT: 218 LBS

## 2025-05-02 DIAGNOSIS — Z04.9 SUSPECTED CONDITION NOT FOUND: Primary | ICD-10-CM

## 2025-05-02 LAB — BACTERIA BLD CULT: NORMAL

## 2025-05-02 PROCEDURE — 99281 EMR DPT VST MAYX REQ PHY/QHP: CPT

## 2025-05-03 LAB — BACTERIA BLD CULT: NORMAL

## 2025-05-03 NOTE — ED PROVIDER NOTES
Encounter Date: 5/2/2025       History     Chief Complaint   Patient presents with    Abnormal Lab     Pt was seen in [facility] ER on Monday.  Stated they wanted to admit her but did not have beds so she would have to wait in the ER.  She chose to leave and go home.  Pt was seen for fever, headache.  Stated they called her today with positive blood cultures and told her she needed to return to the ER and be admitted so she chose to come to [facility].  Stated her headache is gone, but still feels SOB, tired, weakness. Aching all over     59-year-old female presents after being called from the ED for positive blood cultures.  She was seen earlier this week for multiple complaints including fever, arthralgias and myalgias, headache, abdominal fullness.  She reports these symptoms have been improving.  Last fever was 2 days ago.  She was discharged after declining admission on Augmentin and has been compliant.    The history is provided by the patient and medical records.     Review of patient's allergies indicates:  No Known Allergies  Past Medical History:   Diagnosis Date    Arthritis     Diabetes mellitus, type 2     Essential (primary) hypertension     Fatty liver     Hyperlipidemia     Hypertension     Hyperthyroidism     Hypothyroidism     Psoriasis     Spleen enlarged      Past Surgical History:   Procedure Laterality Date    COLONOSCOPY N/A 11/16/2022    Procedure: COLONOSCOPY;  Surgeon: Obed Duran MD;  Location: [facility] ([address] [place] [address]);  Service: Endoscopy;  Laterality: N/A;  per referral entered by Dr. Reyna Duval for screening colonosocpy to be scheduled with EGD/ EGD and colon combined-ERW  Labs last on 10/19/22 and 11/1/22-ERW  prep ins. on portal - ERW    ESOPHAGOGASTRODUODENOSCOPY N/A 11/16/2022    Procedure: ESOPHAGOGASTRODUODENOSCOPY (EGD);  Surgeon: Obed Duran MD;  Location: [facility] ([address] [place] [address]);  Service: Endoscopy;  Laterality: N/A;    ESOPHAGOGASTRODUODENOSCOPY N/A 10/17/2023     Procedure: EGD (ESOPHAGOGASTRODUODENOSCOPY);  Surgeon: Birgit Peralta MD;  Location: [facility] ENDO (4TH FLR);  Service: Endoscopy;  Laterality: N/A;  Ins. to portal. Doabetic takes Ozempic. Cirrhosis: labs ordered. Tbou  referral: Babak BECKHAM  10/10-precall complete-pt verbalized understandingof holding Ozempic-MS    ESOPHAGOGASTRODUODENOSCOPY N/A 10/16/2024    Procedure: EGD (ESOPHAGOGASTRODUODENOSCOPY);  Surgeon: Jose Carlos Villatoro MD;  Location: [facility] (4TH FLR);  Service: Gastroenterology;  Laterality: N/A;  Ref by CHAPINCITO Hilliard, Labs am procedure, Ozempic, portal - PC  10/9 precall complete, labs at 0930, pt states she's been off Ozempic in 2 months, portal-ml    ROBOT-ASSISTED LAPAROSCOPIC LYSIS OF ADHESIONS USING DA REED XI N/A 9/12/2022    Procedure: XI ROBOTIC LYSIS, ADHESIONS;  Surgeon: Silvestre Umaña MD;  Location: [facility];  Service: Oncology;  Laterality: N/A;    THYROIDECTOMY, PARTIAL      XI ROBOTIC HYSTERECTOMY, WITH SALPINGO-OOPHORECTOMY Bilateral 9/12/2022    Procedure: XI ROBOTIC HYSTERECTOMY,WITH SALPINGO-OOPHORECTOMY;  Surgeon: Silvestre Umaña MD;  Location: [facility];  Service: Oncology;  Laterality: Bilateral;     Family History   Problem Relation Name Age of Onset    Cancer Mother          liver    Liver cancer Mother      Cirrhosis Mother          CARTER    Cancer Sister          breast    Breast cancer Sister      Cancer Brother      Breast cancer Cousin      Breast cancer Cousin      Colon cancer Neg Hx      Ovarian cancer Neg Hx       Social History[1]  Review of Systems    Physical Exam     Initial Vitals [05/02/25 2124]   BP Pulse Resp Temp SpO2   (!) 143/68 88 -- 98 °F (36.7 °C) 98 %      MAP       --         Physical Exam    Nursing note and vitals reviewed.  Constitutional: She appears well-developed and well-nourished. No distress.   HENT:   Head: Normocephalic and atraumatic. Mouth/Throat: Oropharynx is clear and moist.   Eyes: Conjunctivae and EOM are normal. Pupils are equal, round, and  reactive to light.   Neck: Neck supple.   Normal range of motion.  Cardiovascular:  Normal rate, regular rhythm and intact distal pulses.           Pulmonary/Chest: Breath sounds normal. No stridor. No respiratory distress.   Abdominal: Abdomen is soft. She exhibits no distension. There is no abdominal tenderness.   Musculoskeletal:         General: Normal range of motion.      Cervical back: Normal range of motion and neck supple.      Comments: Moving all extremities with grossly equal strength     Neurological: She is alert and oriented to person, place, and time.   CN 2-12 appear grossly intact   Skin: Skin is warm and dry.   Psychiatric: She has a normal mood and affect.         ED Course   Procedures  Labs Reviewed - No data to display       Imaging Results    None          Medications - No data to display  Medical Decision Making  Well-appearing nontoxic normal vitals.    Amount and/or Complexity of Data Reviewed  External Data Reviewed: labs.     Details: Positive anaerobic culture noted in the results history of the 2nd blood culture earlier today.  Then reviewed at 96 hours and noted to be negative.  I investigated this irregularity and discussed the case with the house supervisor and Faisal the microbiology  at [facility].  He confirmed that all cultures on the patient are negative                                      Clinical Impression:  Final diagnoses:  [Z04.9] Suspected condition not found (Primary)          ED Disposition Condition    Discharge Stable          ED Prescriptions    None       Follow-up Information       Follow up With Specialties Details Why Contact Info    Reyna Duval MD Internal Medicine  As needed [address] [facility] [address]  [phone]      [facility] - Emergency Dept Emergency Medicine  If symptoms worsen [address] [facility] [address] [phone]               [1]   Social History  Tobacco Use    Smoking status: Never     Smokeless tobacco: Never   Substance Use Topics    Alcohol use: Not Currently    Drug use: Not Currently        Dayday Morales,   05/02/25 0254

## 2025-05-06 ENCOUNTER — OFFICE VISIT (OUTPATIENT)
Dept: ORTHOPEDICS | Facility: CLINIC | Age: 60
End: 2025-05-06
Payer: MEDICAID

## 2025-05-06 DIAGNOSIS — Z96.642 S/P TOTAL LEFT HIP ARTHROPLASTY: Primary | ICD-10-CM

## 2025-05-06 DIAGNOSIS — M25.552 LEFT HIP PAIN: ICD-10-CM

## 2025-05-06 DIAGNOSIS — M16.12 PRIMARY OSTEOARTHRITIS OF LEFT HIP: Primary | ICD-10-CM

## 2025-05-06 PROCEDURE — 99213 OFFICE O/P EST LOW 20 MIN: CPT | Mod: PBBFAC,PN | Performed by: ORTHOPAEDIC SURGERY

## 2025-05-06 PROCEDURE — 3066F NEPHROPATHY DOC TX: CPT | Mod: CPTII,,, | Performed by: ORTHOPAEDIC SURGERY

## 2025-05-06 PROCEDURE — 3044F HG A1C LEVEL LT 7.0%: CPT | Mod: CPTII,,, | Performed by: ORTHOPAEDIC SURGERY

## 2025-05-06 PROCEDURE — 3061F NEG MICROALBUMINURIA REV: CPT | Mod: CPTII,,, | Performed by: ORTHOPAEDIC SURGERY

## 2025-05-06 PROCEDURE — 99999 PR PBB SHADOW E&M-EST. PATIENT-LVL III: CPT | Mod: PBBFAC,,, | Performed by: ORTHOPAEDIC SURGERY

## 2025-05-06 PROCEDURE — 99204 OFFICE O/P NEW MOD 45 MIN: CPT | Mod: S$PBB,,, | Performed by: ORTHOPAEDIC SURGERY

## 2025-05-06 PROCEDURE — 1160F RVW MEDS BY RX/DR IN RCRD: CPT | Mod: CPTII,,, | Performed by: ORTHOPAEDIC SURGERY

## 2025-05-06 PROCEDURE — 1159F MED LIST DOCD IN RCRD: CPT | Mod: CPTII,,, | Performed by: ORTHOPAEDIC SURGERY

## 2025-05-06 PROCEDURE — 4010F ACE/ARB THERAPY RXD/TAKEN: CPT | Mod: CPTII,,, | Performed by: ORTHOPAEDIC SURGERY

## 2025-05-06 NOTE — PROGRESS NOTES
Subjective:      Patient ID: Parris Guevara is a 59 y.o. female.    Chief Complaint: Pain of the Left Hip and Consult    HPI      Parris Guevara is seen for evaluation and treatment of hip pain.  They have experienced problems with their left hip over the past several years Pain is located in the groin  and referred to the knee. They have been treated with physiotherapy and activity modification. Symptoms have recently worsened. Ambulation reportedly has been impaired. Self care ADLs are painful.    Review of Systems   Constitutional: Negative for fever and weight loss.   HENT:  Negative for congestion.    Eyes:  Negative for visual disturbance.   Cardiovascular:  Negative for chest pain.   Respiratory:  Negative for shortness of breath.    Hematologic/Lymphatic: Negative for bleeding problem. Does not bruise/bleed easily.   Skin:  Negative for poor wound healing.   Musculoskeletal:  Positive for joint pain.   Gastrointestinal:  Negative for abdominal pain.   Genitourinary:  Negative for dysuria.   Neurological:  Negative for seizures.   Psychiatric/Behavioral:  Negative for altered mental status.    Allergic/Immunologic: Negative for persistent infections.         Objective:            Ortho/SPM Exam      Left hip    The patient is not in acute distress.   Body habitus is:  Overweight.   Sclerae normal  The patient walks with a limp.   Respiratory distress:  none  The skin over the hip is:intact.   There is:no local tenderness.   Range of motion- Flexion 75, External rotation 25, internal rotation 10.  Resisted SLR positive.  Pain with rotation positive  Sciatic tension findings negative.  Shortening/lengthening compared to the contralateral side exam deferred.  Pulses DP present, PT present.  Motor normal 5/5 strength in all tested muscle groups.   Sensory normal.    IMAGING- left hip radiographs show complete loss of joint space medially with small osteophytes.              Assessment:       Encounter  Diagnoses   Name Primary?    Left hip pain     Primary osteoarthritis of left hip Yes            The condition is radiographically significant.  The patient has very limited nonsurgical options, because of her liver condition.  The patient has significant pain and functional impairment.  We intervention likely to result in meaningful improvement as arthroplasty.        Plan:       Parris was seen today for pain and consult.    Diagnoses and all orders for this visit:    Primary osteoarthritis of left hip    Left hip pain  -     Ambulatory referral/consult to Orthopedics          I explained my diagnostic impression and the reasoning behind it in detail, using layman's terms.  Models and/or pictures were used to help in the explanation.    The process of left total hip replacement was explained to the patient.  The nature of the procedure was explained using a model.  The expected perioperative clinical course and period of recovery as applicable to the patient's condition was described.  The risks including death, infection, thromboembolic events, instability, leg length discrepancy, persistent pain/stiffness, fracture around implant and implant failure due to wear or loosening, with possible need for reoperation, were all explained.  The possibility and expectations of continued nonsurgical care were reviewed.  The patient understands and wishes to proceed with the recommended operation.    The patient agrees to contact her PCP regarding preop clearance.  I will leave any other specialty referrals to PCP discretion.

## 2025-05-07 ENCOUNTER — TELEPHONE (OUTPATIENT)
Dept: ORTHOPEDICS | Facility: CLINIC | Age: 60
End: 2025-05-07
Payer: MEDICAID

## 2025-05-13 ENCOUNTER — LAB VISIT (OUTPATIENT)
Dept: LAB | Facility: HOSPITAL | Age: 60
End: 2025-05-13
Attending: HOSPITALIST
Payer: MEDICAID

## 2025-05-13 DIAGNOSIS — N39.0 URINARY TRACT INFECTION WITH HEMATURIA, SITE UNSPECIFIED: ICD-10-CM

## 2025-05-13 DIAGNOSIS — R31.9 URINARY TRACT INFECTION WITH HEMATURIA, SITE UNSPECIFIED: ICD-10-CM

## 2025-05-13 LAB
BACTERIA #/AREA URNS HPF: ABNORMAL /HPF
BILIRUB UR QL STRIP.AUTO: NEGATIVE
CLARITY UR: CLEAR
COLOR UR AUTO: YELLOW
GLUCOSE UR QL STRIP: NEGATIVE
HGB UR QL STRIP: ABNORMAL
HYALINE CASTS #/AREA URNS LPF: 0 /LPF (ref 0–1)
KETONES UR QL STRIP: NEGATIVE
LEUKOCYTE ESTERASE UR QL STRIP: NEGATIVE
MICROSCOPIC COMMENT: ABNORMAL
NITRITE UR QL STRIP: NEGATIVE
PH UR STRIP: 6 [PH]
PROT UR QL STRIP: NEGATIVE
RBC #/AREA URNS HPF: 10 /HPF (ref 0–4)
SP GR UR STRIP: 1.02
UROBILINOGEN UR STRIP-ACNC: NEGATIVE EU/DL
WBC #/AREA URNS HPF: 3 /HPF (ref 0–5)
YEAST URNS QL MICRO: ABNORMAL /HPF

## 2025-05-13 PROCEDURE — 87086 URINE CULTURE/COLONY COUNT: CPT | Mod: PN

## 2025-05-13 PROCEDURE — 81003 URINALYSIS AUTO W/O SCOPE: CPT | Mod: PN

## 2025-05-14 ENCOUNTER — RESULTS FOLLOW-UP (OUTPATIENT)
Dept: INTERNAL MEDICINE | Facility: CLINIC | Age: 60
End: 2025-05-14
Payer: MEDICAID

## 2025-05-14 ENCOUNTER — HOSPITAL ENCOUNTER (OUTPATIENT)
Dept: RADIOLOGY | Facility: HOSPITAL | Age: 60
Discharge: HOME | End: 2025-05-14
Attending: HOSPITALIST
Payer: MEDICAID

## 2025-05-14 VITALS — HEIGHT: 67 IN | WEIGHT: 218 LBS | BODY MASS INDEX: 34.21 KG/M2

## 2025-05-14 DIAGNOSIS — Z12.31 ENCOUNTER FOR SCREENING MAMMOGRAM FOR BREAST CANCER: ICD-10-CM

## 2025-05-14 DIAGNOSIS — R31.9 HEMATURIA, UNSPECIFIED TYPE: Primary | ICD-10-CM

## 2025-05-14 PROCEDURE — 77063 BREAST TOMOSYNTHESIS BI: CPT | Mod: 26,,, | Performed by: RADIOLOGY

## 2025-05-14 PROCEDURE — 77063 BREAST TOMOSYNTHESIS BI: CPT | Mod: TC

## 2025-05-14 PROCEDURE — 77067 SCR MAMMO BI INCL CAD: CPT | Mod: 26,,, | Performed by: RADIOLOGY

## 2025-05-15 LAB — BACTERIA UR CULT: NORMAL

## 2025-05-16 DIAGNOSIS — M16.12 PRIMARY OSTEOARTHRITIS OF LEFT HIP: Primary | ICD-10-CM

## 2025-05-16 RX ORDER — NAPROXEN 250 MG/1
500 TABLET ORAL
OUTPATIENT
Start: 2025-05-16 | End: 2025-05-16

## 2025-05-16 RX ORDER — ACETAMINOPHEN 325 MG/1
1000 TABLET ORAL
OUTPATIENT
Start: 2025-05-16 | End: 2025-05-16

## 2025-05-16 RX ORDER — SODIUM CHLORIDE 0.9 % (FLUSH) 0.9 %
3 SYRINGE (ML) INJECTION EVERY 8 HOURS
OUTPATIENT
Start: 2025-05-16

## 2025-05-16 RX ORDER — MUPIROCIN 20 MG/G
OINTMENT TOPICAL
OUTPATIENT
Start: 2025-05-16

## 2025-05-23 NOTE — PROGRESS NOTES
Subjective:       Parris Guevara is a 59 y.o. female who is a new patient who was referred by Dr. Reyna Duval  for evaluation of hematuria.      Referred for microhematuria. Three recent UA micro with 6-20 RBCs. Recent UCx negative. No prior gross hematuria. No h/o nephrolithiasis. No family history of  malignancy (+breast cancer). Nonsmoker. No prior UTIs. Denies LUTS. No dysuria. No trauma. No flank pain. No recent upper tract imaging - last imaging MRI 3/2024 with normal , +cirrhosis. S/p hysterectomy.     Planned for hip surgery 6/30/25.     The following portions of the patient's history were reviewed and updated as appropriate: allergies, current medications, past family history, past medical history, past social history, past surgical history and problem list.    Review of Systems  Review of systems completed. Pertinent positive and negatives listed in HPI        Objective:    Vitals: Wt 102.2 kg (225 lb 3.2 oz)   BMI 35.27 kg/m²     Physical Exam   General: well developed, well nourished in no acute distress  Head: normocephalic, atraumatic  Neck: no obvious enlargement of thyroid  HEENT: EOMI, mucus membranes moist, sclera anicteric, no hearing impairment  Lungs: symmetric expansion, non-labored breathing  Neuro: alert and oriented x 3, no gross deficits  Psych: normal judgment and insight, normal mood/affect and non-anxious  Genitourinary:   deferred      Lab Review   Urine analysis today in clinic shows - 250 RBCs     Lab Results   Component Value Date    WBC 5.90 04/28/2025    HGB 12.8 04/28/2025    HGB 12.8 12/10/2024    HCT 38.1 04/28/2025    HCT 38.9 12/10/2024    MCV 91 04/28/2025    MCV 91 12/10/2024     (L) 04/28/2025     12/10/2024     Lab Results   Component Value Date    CREATININE 0.9 04/28/2025    BUN 15 04/28/2025         Imaging  Images and reports were personally reviewed by me and discussed with patient  MRI abd       Assessment/Plan:      1.    Microhematuria    - Discussed etiology and workup of hematuria   - UCx - recently neg   - CT urogram   - Office cystoscopy           Follow up for cysto

## 2025-05-26 ENCOUNTER — OFFICE VISIT (OUTPATIENT)
Dept: UROLOGY | Facility: CLINIC | Age: 60
End: 2025-05-26
Payer: MEDICAID

## 2025-05-26 VITALS — BODY MASS INDEX: 35.27 KG/M2 | WEIGHT: 225.19 LBS

## 2025-05-26 DIAGNOSIS — R31.29 MICROHEMATURIA: Primary | ICD-10-CM

## 2025-05-26 PROCEDURE — 1159F MED LIST DOCD IN RCRD: CPT | Mod: CPTII,,, | Performed by: UROLOGY

## 2025-05-26 PROCEDURE — 1160F RVW MEDS BY RX/DR IN RCRD: CPT | Mod: CPTII,,, | Performed by: UROLOGY

## 2025-05-26 PROCEDURE — 3061F NEG MICROALBUMINURIA REV: CPT | Mod: CPTII,,, | Performed by: UROLOGY

## 2025-05-26 PROCEDURE — 99213 OFFICE O/P EST LOW 20 MIN: CPT | Mod: PBBFAC | Performed by: UROLOGY

## 2025-05-26 PROCEDURE — 3008F BODY MASS INDEX DOCD: CPT | Mod: CPTII,,, | Performed by: UROLOGY

## 2025-05-26 PROCEDURE — 4010F ACE/ARB THERAPY RXD/TAKEN: CPT | Mod: CPTII,,, | Performed by: UROLOGY

## 2025-05-26 PROCEDURE — 99999 PR PBB SHADOW E&M-EST. PATIENT-LVL III: CPT | Mod: PBBFAC,,, | Performed by: UROLOGY

## 2025-05-26 PROCEDURE — 99203 OFFICE O/P NEW LOW 30 MIN: CPT | Mod: S$PBB,,, | Performed by: UROLOGY

## 2025-05-26 PROCEDURE — 3066F NEPHROPATHY DOC TX: CPT | Mod: CPTII,,, | Performed by: UROLOGY

## 2025-05-26 PROCEDURE — 3044F HG A1C LEVEL LT 7.0%: CPT | Mod: CPTII,,, | Performed by: UROLOGY

## 2025-05-27 ENCOUNTER — HOSPITAL ENCOUNTER (OUTPATIENT)
Dept: CARDIOLOGY | Facility: HOSPITAL | Age: 60
Discharge: HOME | End: 2025-05-27
Attending: HOSPITALIST
Payer: MEDICAID

## 2025-05-27 VITALS
BODY MASS INDEX: 35.29 KG/M2 | HEART RATE: 80 BPM | HEIGHT: 67 IN | WEIGHT: 224.88 LBS | DIASTOLIC BLOOD PRESSURE: 73 MMHG | SYSTOLIC BLOOD PRESSURE: 142 MMHG

## 2025-05-27 DIAGNOSIS — R06.02 SOBOE (SHORTNESS OF BREATH ON EXERTION): ICD-10-CM

## 2025-05-27 LAB
AORTIC SIZE INDEX (SOV): 1.4 CM/M2
AORTIC SIZE INDEX: 1.6 CM/M2
ASCENDING AORTA: 3.3 CM
AV AREA BY CONTINUOUS VTI: 3.1 CM2
AV INDEX (PROSTH): 0.9
AV LVOT MEAN GRADIENT: 3 MMHG
AV LVOT PEAK GRADIENT: 5 MMHG
AV MEAN GRADIENT: 5 MMHG
AV PEAK GRADIENT: 8 MMHG
AV VALVE AREA BY VELOCITY RATIO: 2.7 CM²
AV VALVE AREA: 3.1 CM2
AV VELOCITY RATIO: 0.79
BSA FOR ECHO PROCEDURE: 2.19 M2
CV ECHO LV RWT: 0.42 CM
DOP CALC AO PEAK VEL: 1.4 M/S
DOP CALC AO VTI: 27.4 CM
DOP CALC LVOT AREA: 3.5 CM2
DOP CALC LVOT DIAMETER: 2.1 CM
DOP CALC LVOT PEAK VEL: 1.1 M/S
DOP CALC LVOT STROKE VOLUME: 85.5 CM3
DOP CALCLVOT PEAK VEL VTI: 24.7 CM
E WAVE DECELERATION TIME: 210 MS
E/A RATIO: 0.69
E/E' RATIO: 18 M/S
ECHO EF ESTIMATED: 83 %
ECHO LV POSTERIOR WALL: 1 CM (ref 0.6–1.1)
FRACTIONAL SHORTENING: 52.1 % (ref 28–44)
INTERVENTRICULAR SEPTUM: 0.7 CM (ref 0.6–1.1)
IVC DIAMETER: 1.06 CM
IVRT: 105 MS
LA MAJOR: 5 CM
LA MINOR: 4.7 CM
LA WIDTH: 4.5 CM
LEFT ATRIUM SIZE: 4 CM
LEFT ATRIUM VOLUME INDEX MOD: 23 ML/M2
LEFT ATRIUM VOLUME INDEX: 35 ML/M2
LEFT ATRIUM VOLUME MOD: 48 ML
LEFT ATRIUM VOLUME: 74 CM3
LEFT INTERNAL DIMENSION IN SYSTOLE: 2.3 CM (ref 2.1–4)
LEFT VENTRICLE DIASTOLIC VOLUME INDEX: 50.47 ML/M2
LEFT VENTRICLE DIASTOLIC VOLUME: 107 ML
LEFT VENTRICLE MASS INDEX: 64.7 G/M2
LEFT VENTRICLE SYSTOLIC VOLUME INDEX: 9 ML/M2
LEFT VENTRICLE SYSTOLIC VOLUME: 19 ML
LEFT VENTRICULAR INTERNAL DIMENSION IN DIASTOLE: 4.8 CM (ref 3.5–6)
LEFT VENTRICULAR MASS: 137.1 G
LV LATERAL E/E' RATIO: 13.2
LV SEPTAL E/E' RATIO: 26.3
MV A" WAVE DURATION": 112.27 MS
MV PEAK A VEL: 1.14 M/S
MV PEAK E VEL: 0.79 M/S
OHS CV RV/LV RATIO: 0.71 CM
PISA TR MAX VEL: 2.1 M/S
PULM VEIN A" WAVE DURATION": 112.27 MS
PULM VEIN S/D RATIO: 1.39
PULMONIC VEIN PEAK A VELOCITY: 0.2 M/S
PV PEAK D VEL: 0.33 M/S
PV PEAK S VEL: 0.46 M/S
RA MAJOR: 4.11 CM
RA PRESSURE ESTIMATED: 3 MMHG
RA WIDTH: 4.03 CM
RIGHT ATRIAL AREA: 12.1 CM2
RIGHT VENTRICLE DIASTOLIC BASEL DIMENSION: 3.4 CM
RV TB RVSP: 5 MMHG
RV TISSUE DOPPLER FREE WALL SYSTOLIC VELOCITY 1 (APICAL 4 CHAMBER VIEW): 12.55 CM/S
SINUS: 3.03 CM
STJ: 2.7 CM
TDI LATERAL: 0.06 M/S
TDI SEPTAL: 0.03 M/S
TDI: 0.05 M/S
TRICUSPID ANNULAR PLANE SYSTOLIC EXCURSION: 1.8 CM
TV PEAK GRADIENT: 17 MMHG
TV REST PULMONARY ARTERY PRESSURE: 21 MMHG
Z-SCORE OF LEFT VENTRICULAR DIMENSION IN END DIASTOLE: -3.33
Z-SCORE OF LEFT VENTRICULAR DIMENSION IN END SYSTOLE: -4.55

## 2025-05-27 PROCEDURE — 93306 TTE W/DOPPLER COMPLETE: CPT

## 2025-05-27 PROCEDURE — 93306 TTE W/DOPPLER COMPLETE: CPT | Mod: 26,,, | Performed by: STUDENT IN AN ORGANIZED HEALTH CARE EDUCATION/TRAINING PROGRAM

## 2025-06-02 ENCOUNTER — HOSPITAL ENCOUNTER (OUTPATIENT)
Dept: RADIOLOGY | Facility: HOSPITAL | Age: 60
Discharge: HOME | End: 2025-06-02
Attending: UROLOGY
Payer: MEDICAID

## 2025-06-02 DIAGNOSIS — R31.29 MICROHEMATURIA: ICD-10-CM

## 2025-06-02 PROCEDURE — 74178 CT ABD&PLV WO CNTR FLWD CNTR: CPT | Mod: TC

## 2025-06-02 PROCEDURE — 74178 CT ABD&PLV WO CNTR FLWD CNTR: CPT | Mod: 26,,, | Performed by: RADIOLOGY

## 2025-06-02 PROCEDURE — 25500020 PHARM REV CODE 255: Performed by: UROLOGY

## 2025-06-02 RX ADMIN — IOHEXOL 125 ML: 350 INJECTION, SOLUTION INTRAVENOUS at 11:06

## 2025-06-03 ENCOUNTER — OFFICE VISIT (OUTPATIENT)
Dept: ORTHOPEDICS | Facility: CLINIC | Age: 60
End: 2025-06-03
Payer: MEDICAID

## 2025-06-03 DIAGNOSIS — M16.12 PRIMARY OSTEOARTHRITIS OF LEFT HIP: Primary | ICD-10-CM

## 2025-06-03 DIAGNOSIS — Z01.818 PRE-OP EXAM: ICD-10-CM

## 2025-06-03 DIAGNOSIS — Z96.642 S/P TOTAL LEFT HIP ARTHROPLASTY: ICD-10-CM

## 2025-06-03 PROCEDURE — 99999 PR PBB SHADOW E&M-EST. PATIENT-LVL III: CPT | Mod: PBBFAC,,,

## 2025-06-03 PROCEDURE — 99213 OFFICE O/P EST LOW 20 MIN: CPT | Mod: PBBFAC,PN

## 2025-06-03 NOTE — H&P (VIEW-ONLY)
Subjective:      Patient ID: Parris Guevara is a 59 y.o. female.    Chief Complaint: Left hip pain     HPI: Parris Guevara is a 59 y.o. female who presents to the clinic today for a patient optimization visit in preparation for a left total hip arthroplasty to be performed by Dr. Leigh on  06/30/2025.  She has a significant history of left hip pain.  Pain is worse with activity and weight bearing.  Patient has experienced interference of ADLs due to increased pain and decreased range of motion. Patient has failed non-operative treatment including NSAIDs, activity modification, weight loss, and corticosteroid injections. Parris Guevara ambulates without an assistive device.. She was last seen and treated in the clinic on 5/6/2025. There has been no significant change in medical status since last visit.    Patient states she has an appointment on 06/18 with hepatology and her PCP for pre-op clearance.  Patient also states she is unable to take a significant amount of medicine with acetaminophen.  She is requesting tramadol for postoperative pain management.  She also states she is unable to take a significant amount or long term anti-inflammatories.  She states she is going to speak with her liver doctor and primary care doctor about taking aspirin for DVT prophylaxis.    Ambulating: unassisted  Diabetic:  Yes (most recent Hemoglobin A1C: 5.3)  Smoking:  She has never smoked.  History of DVT/PE: Negative      PMHx significant for:         -  diabetes type 2        -  hypertension        -  fatty liver        -  hyperlipidemia        -  Grave's disease hypothyroidism s/p partial thyroidectomy at age 11 yo         -  psoriasis    PAST MEDICAL HISTORY:    Past Medical History:   Diagnosis Date    Arthritis     Diabetes mellitus, type 2     Essential (primary) hypertension     Fatty liver     Hyperlipidemia     Hypertension     Hyperthyroidism     Hypothyroidism     Psoriasis     Spleen enlarged       PAST SURGICAL HISTORY:    Past Surgical History:   Procedure Laterality Date    COLONOSCOPY N/A 11/16/2022    Procedure: COLONOSCOPY;  Surgeon: Obed Duran MD;  Location: [facility] (2ND FLR);  Service: Endoscopy;  Laterality: N/A;  per referral entered by Dr. Reyna Duval for screening colonosocpy to be scheduled with EGD/ EGD and colon combined-ERW  Labs last on 10/19/22 and 11/1/22-ERW  prep ins. on portal - ERW    ESOPHAGOGASTRODUODENOSCOPY N/A 11/16/2022    Procedure: ESOPHAGOGASTRODUODENOSCOPY (EGD);  Surgeon: Obed Duran MD;  Location: [facility] (2ND FLR);  Service: Endoscopy;  Laterality: N/A;    ESOPHAGOGASTRODUODENOSCOPY N/A 10/17/2023    Procedure: EGD (ESOPHAGOGASTRODUODENOSCOPY);  Surgeon: Birgit Peralta MD;  Location: [facility] (4TH FLR);  Service: Endoscopy;  Laterality: N/A;  Ins. to portal. Doabetic takes Ozempic. Cirrhosis: labs ordered. Tbou  referral: Babak BECKHAM  10/10-precall complete-pt verbalized understandingof holding Ozempic-MS    ESOPHAGOGASTRODUODENOSCOPY N/A 10/16/2024    Procedure: EGD (ESOPHAGOGASTRODUODENOSCOPY);  Surgeon: Jose Carlos Villatoro MD;  Location: [facility] (4TH FLR);  Service: Gastroenterology;  Laterality: N/A;  Ref by CHAPINCITO Hilliard, Labs am procedure, Ozempic, portal - PC  10/9 precall complete, labs at 0930, pt states she's been off Ozempic in 2 months, portal-ml    ROBOT-ASSISTED LAPAROSCOPIC LYSIS OF ADHESIONS USING DA REED XI N/A 9/12/2022    Procedure: XI ROBOTIC LYSIS, ADHESIONS;  Surgeon: Silvestre Umaña MD;  Location: [facility];  Service: Oncology;  Laterality: N/A;    THYROIDECTOMY, PARTIAL      XI ROBOTIC HYSTERECTOMY, WITH SALPINGO-OOPHORECTOMY Bilateral 9/12/2022    Procedure: XI ROBOTIC HYSTERECTOMY,WITH SALPINGO-OOPHORECTOMY;  Surgeon: Silvestre Umaña MD;  Location: [facility];  Service: Oncology;  Laterality: Bilateral;     FAMILY HISTORY:    Family History   Problem Relation Name Age of Onset    Cancer Mother          liver    Liver cancer Mother       Cirrhosis Mother          CARTER    Cancer Sister          breast    Breast cancer Sister      Cancer Brother      Breast cancer Cousin      Breast cancer Cousin      Colon cancer Neg Hx      Ovarian cancer Neg Hx       SOCIAL HISTORY:    Social History     Occupational History    Occupation: Retired. Former Nurse.   Tobacco Use    Smoking status: Never    Smokeless tobacco: Never   Substance and Sexual Activity    Alcohol use: Not Currently    Drug use: Not Currently    Sexual activity: Not Currently     Partners: Male      MEDICATIONS: Current Medications[1]  ALLERGIES: Review of patient's allergies indicates:  No Known Allergies    Review of Systems:  Constitution: Negative for chills, fever and night sweats.   HENT: Negative for congestion and headaches.    Eyes: Negative for blurred vision or vision loss.  Cardiovascular: Negative for chest pain and syncope.   Respiratory: Negative for cough and shortness of breath.    Endocrine: Negative for polydipsia, polyphagia and polyuria.   Hematologic/Lymphatic: Negative for bleeding problem. Does not bruise/bleed easily.   Skin: Negative for dry skin, itching and rash.   Musculoskeletal: See HPI.   Gastrointestinal: Negative for abdominal pain and bowel incontinence.   Genitourinary: Negative for bladder incontinence and nocturia.   Neurological: Negative for disturbances in coordination, loss of balance and seizures.   Psychiatric/Behavioral: Negative for depression. The patient does not have insomnia.    Allergic/Immunologic: Negative for hives and persistent infections.        Objective:      There were no vitals filed for this visit.    PHYSICAL EXAM:  General: Alert & oriented x3, well-developed and well-nourished, in no acute distress, sitting comfortably in the exam room.  Skin: Warm and dry. Capillary refill less than 2 seconds.   Head: Normocephalic and atraumatic.   Eyes: Sclera appear normal.   Nose: No deformities seen.   Ears: No deformities seen.   Neck:  No tracheal deviation present.   Pulmonary/Chest: Breathing unlabored. Breath sounds normal.  Cardiovascular: Normal rate and regular rhythm.   Abdominal: Soft and nondistended.  Neurological: Alert and oriented to person, place, and time.   Psychiatric: Mood is pleasant and affect appropriate.     LEFT HIP:        Body habitus is:  overweight.         The patient walks with a limp.         Resisted SLR:  positive.        Sciatic tension findings:  negative.        The skin over the hip is intact.        Tenderness:  no local tenderness.        Range of Motion:    Flexion:   75°  External Rotation:   25°  Internal Rotation:   10°        Pain with rotation:  positive        Shortening/lengthening compared to the contralateral side:  exam deferred.        Pulses DP present, PT present.        Motor:  normal 5/5 strength in all tested muscle groups.         Sensory:  normal.      Lab Review:         CBC from  04/25/2025 reviewed: stable        CMP from  04/25/2025 reviewed: stable        Hemoglobin A1C from  04/25/2025 reviewed:  5.3     Diagnostics Review:         EKG from  04/28/2025 Impression: Normal sinus rhythm. Cannot rule out Anterior infarct ,age undetermined. Abnormal ECG. When compared with ECG of 11-Jun-2024 10:52, No significant change was found.      Imaging:         Dr. Leigh previous imaging read:  left hip radiographs show complete loss of joint space medially with small osteophytes.        Assessment:       1. Primary osteoarthritis of left hip    2. S/P total left hip arthroplasty    3. Pre-op exam         Plan:       Orders Placed This Encounter    BATH/SHOWER CHAIR FOR HOME USE    WALKER FOR HOME USE    Ambulatory referral/consult to Home Health       Left total hip arthroplasty scheduled for 06/30/2025.    Discharge planning was discussed and patient plans to go home the same day of surgery if possible.    Post-op Home Health and DME has been ordered including standard walker and shower chair.      During today's Patient Optimization Visit all consultant notes, medications, imaging, EKG, and lab work has been reviewed. Discharge planning was discussed and Home Health has been ordered. Any additional testing or consults needed for medical clearance has been ordered. Pre, tereso, and post operative procedures and expectations discussed. All questions were answered.   Parris Demi Guevara will contact us if there are any questions, concerns, or changes in medical status prior to surgery.    30 minutes were spent on this encounter. This includes face to face time and non-face to face time preparing to see the patient (eg, review of tests), obtaining and/or reviewing separately obtained history, documenting clinical information in the electronic or other health record, independently interpreting results and communicating results to the patient/family/caregiver, or care coordinator.     Walker Justification: The mobility limitation cannot be sufficiently resolved by the use of a cane.   Patient's functional mobility deficit can be sufficiently resolved with the use of a (Rolling Walker or Walker).  Patient's mobility limitation significantly impairs their ability to participate in one of more activities of daily living.  The use of a (RW or Walker) will significantly improve the patient's ability to participate in MRADLS and the patient will use it on regular basis in the home.     All of the patient's questions were answered and the patient will contact us if they have any questions or concerns in the interim.      Noreen Holt PA-C  [facility]  Orthopedic Surgery    Medical Dictation software was used during the dictation of portions or the entirety of this medical record.  Phonetic or grammatic errors may exist due to the use of this software. For clarification, refer to the author of the document.              [1]   Current Outpatient Medications:     calcium carbonate (OS-EDITA) 500 mg calcium (1,250 mg)  tablet, Take 2 tablets by mouth once daily., Disp: , Rfl:     carvediloL (COREG) 6.25 MG tablet, Take 1 tablet (6.25 mg total) by mouth 2 (two) times daily. DOSE INCREASE, Disp: 60 tablet, Rfl: 6    clobetasol 0.05% (TEMOVATE) 0.05 % Oint, Apply topically., Disp: , Rfl:     ergocalciferol (ERGOCALCIFEROL) 50,000 unit Cap, Take 1 capsule (50,000 Units total) by mouth every 7 days., Disp: 12 capsule, Rfl: 3    HUMIRA PEN PnKt injection, Inject 40 mg into the skin every 14 (fourteen) days., Disp: , Rfl:     lactulose (CHRONULAC) 20 gram/30 mL Soln, Take 15 mLs (10 g total) by mouth 2 (two) times daily as needed., Disp: 1000 mL, Rfl: 5    levothyroxine (SYNTHROID) 125 MCG tablet, TAKE 1 TABLET BY MOUTH EVERY DAY, Disp: 90 tablet, Rfl: 3    pantoprazole (PROTONIX) 40 MG tablet, Take 1 tablet (40 mg total) by mouth once daily., Disp: 90 tablet, Rfl: 3    semaglutide (OZEMPIC) 0.25 mg or 0.5 mg (2 mg/3 mL) pen injector, Inject 0.5 mg into the skin every 7 days., Disp: 9 mL, Rfl: 2    triamcinolone acetonide 0.1% (KENALOG) 0.1 % cream, APPLY TO AFFECTED AREA TWICE A DAY DO NOT APPLY TO FACE, GROIN OR UNDERARM, Disp: 80 g, Rfl: 1    valsartan (DIOVAN) 320 MG tablet, TAKE 1 TABLET BY MOUTH EVERY DAY, Disp: 90 tablet, Rfl: 3  No current facility-administered medications for this visit.

## 2025-06-09 ENCOUNTER — PROCEDURE VISIT (OUTPATIENT)
Dept: UROLOGY | Facility: CLINIC | Age: 60
End: 2025-06-09
Payer: MEDICAID

## 2025-06-09 VITALS — BODY MASS INDEX: 35.8 KG/M2 | WEIGHT: 228.06 LBS

## 2025-06-09 DIAGNOSIS — R31.29 MICROHEMATURIA: ICD-10-CM

## 2025-06-09 PROCEDURE — 52000 CYSTOURETHROSCOPY: CPT | Mod: PBBFAC | Performed by: UROLOGY

## 2025-06-09 NOTE — PROCEDURES
Cystoscopy    Date/Time: 6/9/2025 9:00 AM    Performed by: Claudia Cardozo MD  Authorized by: Claudia Cardozo MD    Consent Done?:  Yes (Written)  Timeout: prior to procedure the correct patient, procedure, and site was verified    Prep: patient was prepped and draped in usual sterile fashion    Anesthesia:  Lidocaine jelly  Indications: hematuria    Position:  Dorsal lithotomy  Anesthesia:  Lidocaine jelly  Patient sedated?: No    Preparation: Patient was prepped and draped in usual sterile fashion    Scope type:  Flexible cystoscope  Stent inserted: No    Stent removed: No    External exam normal: Yes    Digital exam performed: No    Urethra normal: Yes    Bladder neck normal: Yes    Bladder normal: Yes (Mild squamous metaplasia in trigone)     patient tolerated the procedure well with no immediate complications  Comments:        CT uro - no  issues. Known cirrhosis, splenomegaly, cholelithiasis.     Cysto - normal    RTC 6mths

## 2025-06-11 ENCOUNTER — HOSPITAL ENCOUNTER (OUTPATIENT)
Dept: RADIOLOGY | Facility: HOSPITAL | Age: 60
Discharge: HOME | End: 2025-06-11
Attending: NURSE PRACTITIONER
Payer: MEDICAID

## 2025-06-11 DIAGNOSIS — K74.60 LIVER CIRRHOSIS SECONDARY TO NASH: ICD-10-CM

## 2025-06-11 DIAGNOSIS — K75.81 LIVER CIRRHOSIS SECONDARY TO NASH: ICD-10-CM

## 2025-06-11 PROCEDURE — 74183 MRI ABD W/O CNTR FLWD CNTR: CPT | Mod: TC

## 2025-06-11 PROCEDURE — A9585 GADOBUTROL INJECTION: HCPCS | Performed by: NURSE PRACTITIONER

## 2025-06-11 PROCEDURE — 25500020 PHARM REV CODE 255: Performed by: NURSE PRACTITIONER

## 2025-06-11 PROCEDURE — 74183 MRI ABD W/O CNTR FLWD CNTR: CPT | Mod: 26,,, | Performed by: RADIOLOGY

## 2025-06-11 RX ORDER — GADOBUTROL 604.72 MG/ML
10 INJECTION INTRAVENOUS
Status: COMPLETED | OUTPATIENT
Start: 2025-06-11 | End: 2025-06-11

## 2025-06-11 RX ADMIN — GADOBUTROL 10 ML: 604.72 INJECTION INTRAVENOUS at 11:06

## 2025-06-12 ENCOUNTER — RESULTS FOLLOW-UP (OUTPATIENT)
Dept: HEPATOLOGY | Facility: CLINIC | Age: 60
End: 2025-06-12

## 2025-06-18 ENCOUNTER — OFFICE VISIT (OUTPATIENT)
Dept: INTERNAL MEDICINE | Facility: CLINIC | Age: 60
End: 2025-06-18
Payer: MEDICAID

## 2025-06-18 ENCOUNTER — OFFICE VISIT (OUTPATIENT)
Dept: HEPATOLOGY | Facility: CLINIC | Age: 60
End: 2025-06-18
Payer: MEDICAID

## 2025-06-18 VITALS
SYSTOLIC BLOOD PRESSURE: 130 MMHG | WEIGHT: 227.5 LBS | BODY MASS INDEX: 35.71 KG/M2 | HEART RATE: 80 BPM | OXYGEN SATURATION: 98 % | HEIGHT: 67 IN | TEMPERATURE: 98 F | DIASTOLIC BLOOD PRESSURE: 84 MMHG

## 2025-06-18 VITALS
HEIGHT: 67 IN | BODY MASS INDEX: 35.71 KG/M2 | HEART RATE: 73 BPM | WEIGHT: 227.5 LBS | DIASTOLIC BLOOD PRESSURE: 78 MMHG | OXYGEN SATURATION: 97 % | SYSTOLIC BLOOD PRESSURE: 130 MMHG

## 2025-06-18 DIAGNOSIS — E11.59 HYPERTENSION ASSOCIATED WITH TYPE 2 DIABETES MELLITUS: ICD-10-CM

## 2025-06-18 DIAGNOSIS — K74.60 LIVER CIRRHOSIS SECONDARY TO NASH: Primary | ICD-10-CM

## 2025-06-18 DIAGNOSIS — K76.9 LIVER LESION: ICD-10-CM

## 2025-06-18 DIAGNOSIS — K76.9 LIVER DISEASE, UNSPECIFIED: ICD-10-CM

## 2025-06-18 DIAGNOSIS — E66.01 CLASS 2 SEVERE OBESITY DUE TO EXCESS CALORIES WITH SERIOUS COMORBIDITY AND BODY MASS INDEX (BMI) OF 35.0 TO 35.9 IN ADULT: ICD-10-CM

## 2025-06-18 DIAGNOSIS — Z01.818 PREOP EXAMINATION: Primary | ICD-10-CM

## 2025-06-18 DIAGNOSIS — E11.9 TYPE 2 DIABETES MELLITUS WITHOUT COMPLICATION, WITHOUT LONG-TERM CURRENT USE OF INSULIN: ICD-10-CM

## 2025-06-18 DIAGNOSIS — I85.10 SECONDARY ESOPHAGEAL VARICES WITHOUT BLEEDING: ICD-10-CM

## 2025-06-18 DIAGNOSIS — E66.812 CLASS 2 SEVERE OBESITY DUE TO EXCESS CALORIES WITH SERIOUS COMORBIDITY AND BODY MASS INDEX (BMI) OF 35.0 TO 35.9 IN ADULT: ICD-10-CM

## 2025-06-18 DIAGNOSIS — K76.6 PORTAL HYPERTENSION: ICD-10-CM

## 2025-06-18 DIAGNOSIS — I15.2 HYPERTENSION ASSOCIATED WITH TYPE 2 DIABETES MELLITUS: ICD-10-CM

## 2025-06-18 DIAGNOSIS — E11.69 HYPERLIPIDEMIA ASSOCIATED WITH TYPE 2 DIABETES MELLITUS: ICD-10-CM

## 2025-06-18 DIAGNOSIS — K75.81 LIVER CIRRHOSIS SECONDARY TO NASH: Primary | ICD-10-CM

## 2025-06-18 DIAGNOSIS — E78.5 HYPERLIPIDEMIA ASSOCIATED WITH TYPE 2 DIABETES MELLITUS: ICD-10-CM

## 2025-06-18 PROBLEM — R74.8 ELEVATED ALKALINE PHOSPHATASE LEVEL: Status: RESOLVED | Noted: 2022-07-13 | Resolved: 2025-06-18

## 2025-06-18 LAB
OHS QRS DURATION: 86 MS
OHS QTC CALCULATION: 475 MS

## 2025-06-18 PROCEDURE — 3061F NEG MICROALBUMINURIA REV: CPT | Mod: CPTII,,, | Performed by: HOSPITALIST

## 2025-06-18 PROCEDURE — 99215 OFFICE O/P EST HI 40 MIN: CPT | Mod: S$PBB,,, | Performed by: NURSE PRACTITIONER

## 2025-06-18 PROCEDURE — 3066F NEPHROPATHY DOC TX: CPT | Mod: CPTII,,, | Performed by: HOSPITALIST

## 2025-06-18 PROCEDURE — 3066F NEPHROPATHY DOC TX: CPT | Mod: CPTII,,, | Performed by: NURSE PRACTITIONER

## 2025-06-18 PROCEDURE — 1160F RVW MEDS BY RX/DR IN RCRD: CPT | Mod: CPTII,,, | Performed by: HOSPITALIST

## 2025-06-18 PROCEDURE — 93010 ELECTROCARDIOGRAM REPORT: CPT | Mod: S$PBB,,, | Performed by: INTERNAL MEDICINE

## 2025-06-18 PROCEDURE — 3044F HG A1C LEVEL LT 7.0%: CPT | Mod: CPTII,,, | Performed by: NURSE PRACTITIONER

## 2025-06-18 PROCEDURE — 3008F BODY MASS INDEX DOCD: CPT | Mod: CPTII,,, | Performed by: NURSE PRACTITIONER

## 2025-06-18 PROCEDURE — 99999 PR PBB SHADOW E&M-EST. PATIENT-LVL IV: CPT | Mod: PBBFAC,,, | Performed by: NURSE PRACTITIONER

## 2025-06-18 PROCEDURE — 99999 PR PBB SHADOW E&M-EST. PATIENT-LVL III: CPT | Mod: PBBFAC,,, | Performed by: HOSPITALIST

## 2025-06-18 PROCEDURE — 3078F DIAST BP <80 MM HG: CPT | Mod: CPTII,,, | Performed by: NURSE PRACTITIONER

## 2025-06-18 PROCEDURE — 3079F DIAST BP 80-89 MM HG: CPT | Mod: CPTII,,, | Performed by: HOSPITALIST

## 2025-06-18 PROCEDURE — 1160F RVW MEDS BY RX/DR IN RCRD: CPT | Mod: CPTII,,, | Performed by: NURSE PRACTITIONER

## 2025-06-18 PROCEDURE — 4010F ACE/ARB THERAPY RXD/TAKEN: CPT | Mod: CPTII,,, | Performed by: NURSE PRACTITIONER

## 2025-06-18 PROCEDURE — 3075F SYST BP GE 130 - 139MM HG: CPT | Mod: CPTII,,, | Performed by: NURSE PRACTITIONER

## 2025-06-18 PROCEDURE — 4010F ACE/ARB THERAPY RXD/TAKEN: CPT | Mod: CPTII,,, | Performed by: HOSPITALIST

## 2025-06-18 PROCEDURE — 99213 OFFICE O/P EST LOW 20 MIN: CPT | Mod: PBBFAC,PO | Performed by: HOSPITALIST

## 2025-06-18 PROCEDURE — 3061F NEG MICROALBUMINURIA REV: CPT | Mod: CPTII,,, | Performed by: NURSE PRACTITIONER

## 2025-06-18 PROCEDURE — 99214 OFFICE O/P EST MOD 30 MIN: CPT | Mod: PBBFAC,27 | Performed by: NURSE PRACTITIONER

## 2025-06-18 PROCEDURE — 3044F HG A1C LEVEL LT 7.0%: CPT | Mod: CPTII,,, | Performed by: HOSPITALIST

## 2025-06-18 PROCEDURE — 1159F MED LIST DOCD IN RCRD: CPT | Mod: CPTII,,, | Performed by: NURSE PRACTITIONER

## 2025-06-18 PROCEDURE — G2211 COMPLEX E/M VISIT ADD ON: HCPCS | Mod: ,,, | Performed by: HOSPITALIST

## 2025-06-18 PROCEDURE — 99214 OFFICE O/P EST MOD 30 MIN: CPT | Mod: S$PBB,,, | Performed by: HOSPITALIST

## 2025-06-18 PROCEDURE — 1159F MED LIST DOCD IN RCRD: CPT | Mod: CPTII,,, | Performed by: HOSPITALIST

## 2025-06-18 PROCEDURE — 93005 ELECTROCARDIOGRAM TRACING: CPT | Mod: PBBFAC,PO | Performed by: INTERNAL MEDICINE

## 2025-06-18 PROCEDURE — 3008F BODY MASS INDEX DOCD: CPT | Mod: CPTII,,, | Performed by: HOSPITALIST

## 2025-06-18 PROCEDURE — 3075F SYST BP GE 130 - 139MM HG: CPT | Mod: CPTII,,, | Performed by: HOSPITALIST

## 2025-06-18 RX ORDER — CARVEDILOL 6.25 MG/1
6.25 TABLET ORAL 2 TIMES DAILY
Qty: 60 TABLET | Refills: 6 | Status: SHIPPED | OUTPATIENT
Start: 2025-06-18

## 2025-06-18 RX ORDER — MELOXICAM 15 MG/1
15 TABLET ORAL
COMMUNITY
Start: 2024-12-23 | End: 2025-12-23

## 2025-06-18 RX ORDER — GABAPENTIN 300 MG/1
300 CAPSULE ORAL 3 TIMES DAILY
COMMUNITY
Start: 2024-12-23 | End: 2025-12-23

## 2025-06-18 NOTE — Clinical Note
Hi good morning: just FYI her liver MRI happened to capture Severe calcific atherosclerosis of the aorta. She was on lipitor last year but it seems to have fallen off of her med list.  Thanks !

## 2025-06-18 NOTE — PROGRESS NOTES
[facility] Established Patient Visit    Reason for Visit:  cirrhosis      PCP: Reyna Duval    HPI:  This is a 59 y.o. female with PMH noted below, here for follow up of Well-compensated cirrhosis likely due to metabolic fatty liver versus MTX   Was on MTX for years, stopped ~2022    Mom  from CARTER cirrhosis     RA and PSA followed by rheumatology at [place] ([facility] /Rheumatology Dept ([phone])     Diagnosis of cirrhosis based on  liver biopsy  2022. No PBC on biopsy     Serological workup was negative for Levar's, alpha-1 antitrypsin deficiency, hemochromatosis, and viral hepatitis B and C    Prior serologic workup:   Lab Results   Component Value Date    FERRITIN 122 2022    FESATURATED 19 (L) 2022    CERULOPLSM 27.0 2022    HEPBSAG Non-reactive 2022    HEPCAB Non-reactive 2022     Risk factors for NAFLD include obesity, HTN, HLD, DM    Interval HPI: Presents today alone. No s/s of hepatic decompensation: no ascites, HE or h/o variceal bleeding.   on Humira per [facility] rheum   On Coreg 6.25 mg BID, HR 70s, Started on BB based on 10/2024 EGD that noted small varices for prevention of progression   On Lactulose per PCP For constipation   On Ozempic 0.5 mg weekly, currently 227, some weight gain since last visit. Had GI issues in the past on the 1 mg dose   AFP 5.1   2025    Abd MRI done 2025 showed cirrhosis, steatosis   Few foci of arterial enhancement within the with normalization on delayed phases (series 11, images 49, 41), likely transient perfusion differences. No pseudo capsule. No washout. No diffusion restriction.     Liver lesion on MRI previously discussed at IR conference (last discussed in ), no concerning features but continue f/u     Lab Results   Component Value Date    ALT 23 2025    AST 29 2025    ALKPHOS 124 2025    BILITOT 0.5 2025    ALBUMIN 3.4 (L) 2025    INR 1.0 2025     2025      MELD 3.0: 8 at 6/11/2025  9:27 AM  MELD-Na: 6 at 6/11/2025  9:27 AM  Calculated from:  Serum Creatinine: 0.9 mg/dL (Using min of 1 mg/dL) at 6/11/2025  9:27 AM  Serum Sodium: 137 mmol/L at 6/11/2025  9:27 AM  Total Bilirubin: 0.5 mg/dL (Using min of 1 mg/dL) at 6/11/2025  9:27 AM  Serum Albumin: 3.4 g/dL at 6/11/2025  9:27 AM  INR(ratio): 1 at 6/11/2025  9:27 AM  Age at listing (hypothetical): 59 years  Sex: Female at 6/11/2025  9:27 AM  MELD 8    Cirrhosis Health Maintenance:   -- Last  EGD 10/2024 varices, repeat 10/2025 (can potentially stop EGD in the future if stable/improved with goal Coreg dose)  -- Due for next colonoscopy : 2027  -- HCC screening   US without concerning lesion, repeat in 3-4 months due to mildly elevated AFP   AFP mildly elevated - repeat in 3-4 months     -- Immunity to Hep A and B - completed [employer]     + family history of liver disease: mother had CARTER cirrhosis. Denies current alcohol consumption  or history of significant alcohol consumption in past   Social History     Substance and Sexual Activity   Alcohol Use Never         PMHX:  has a past medical history of Arthritis, Diabetes mellitus, type 2, Essential (primary) hypertension, Fatty liver, Hyperlipidemia, Hypertension, Hyperthyroidism, Hypothyroidism, Psoriasis, and Spleen enlarged.    PSHX:  has a past surgical history that includes Thyroidectomy, partial; xi robotic hysterectomy, with salpingo-oophorectomy (Bilateral, 09/12/2022); Robot-assisted laparoscopic lysis of adhesions using da Ema Xi (N/A, 09/12/2022); Esophagogastroduodenoscopy (N/A, 11/16/2022); Colonoscopy (N/A, 11/16/2022); Esophagogastroduodenoscopy (N/A, 10/17/2023); Esophagogastroduodenoscopy (N/A, 10/16/2024); and Arthroplasty of shoulder (Right).    The patient's social and family histories were reviewed by me and updated in the appropriate section of the electronic medical record.    Review of patient's allergies indicates:  No Known  "Allergies      Current Outpatient Medications on File Prior to Visit   Medication Sig Dispense Refill    calcium carbonate (OS-EDITA) 500 mg calcium (1,250 mg) tablet Take 2 tablets by mouth once daily.      carvediloL (COREG) 6.25 MG tablet Take 1 tablet (6.25 mg total) by mouth 2 (two) times daily. DOSE INCREASE 60 tablet 6    ergocalciferol (ERGOCALCIFEROL) 50,000 unit Cap Take 1 capsule (50,000 Units total) by mouth every 7 days. 12 capsule 3    gabapentin (NEURONTIN) 300 MG capsule Take 300 mg by mouth 3 (three) times daily.      HUMIRA PEN PnKt injection Inject 40 mg into the skin every 14 (fourteen) days.      lactulose (CHRONULAC) 20 gram/30 mL Soln Take 15 mLs (10 g total) by mouth 2 (two) times daily as needed. 1000 mL 5    levothyroxine (SYNTHROID) 125 MCG tablet TAKE 1 TABLET BY MOUTH EVERY DAY 90 tablet 3    meloxicam (MOBIC) 15 MG tablet Take 15 mg by mouth.      pantoprazole (PROTONIX) 40 MG tablet Take 1 tablet (40 mg total) by mouth once daily. 90 tablet 3    semaglutide (OZEMPIC) 0.25 mg or 0.5 mg (2 mg/3 mL) pen injector Inject 0.5 mg into the skin every 7 days. 9 mL 2    triamcinolone acetonide 0.1% (KENALOG) 0.1 % cream APPLY TO AFFECTED AREA TWICE A DAY DO NOT APPLY TO FACE, GROIN OR UNDERARM 80 g 1    valsartan (DIOVAN) 320 MG tablet TAKE 1 TABLET BY MOUTH EVERY DAY 90 tablet 3     No current facility-administered medications on file prior to visit.       SOCIAL HISTORY:   Social History     Substance and Sexual Activity   Alcohol Use Never       Social History     Substance and Sexual Activity   Drug Use Never       ROS:   GENERAL: intermittent fatigue  CARDIOVASCULAR: Denies edema  GI: Denies abdominal pain  SKIN: Denies rash, itching   NEURO: Denies confusion, memory loss, or mood changes    Objective Findings:    PHYSICAL EXAM:   Friendly Black or  female, in no acute distress; alert and oriented to person, place and time  VITALS: Ht 5' 7" (1.702 m)   Wt 103.2 kg (227 lb 8.2 " oz)   BMI 35.63 kg/m²   EYES: Sclerae anicteric  GI: Soft, non-tender, non-distended. No ascites.  EXTREMITIES:  No edema.  SKIN: Warm and dry. No jaundice. No telangectasias noted. No palmar erythema.  NEURO:  No asterixis.  PSYCH:  Thought and speech pattern appropriate. Behavior normal      EDUCATION:  See instructions discussed with patient in Instructions section of the After Visit Summary       ASSESSMENT & PLAN:  59 y.o. Black or  female with:  1.  Cirrhosis, likely due to CARTER versus MTX (?), well compensated  --- cirrhosis diagnosis based on biopsy  -- MELD 3.0: 8 at 6/11/2025  9:27 AM  MELD-Na: 6 at 6/11/2025  9:27 AM  Calculated from:  Serum Creatinine: 0.9 mg/dL (Using min of 1 mg/dL) at 6/11/2025  9:27 AM  Serum Sodium: 137 mmol/L at 6/11/2025  9:27 AM  Total Bilirubin: 0.5 mg/dL (Using min of 1 mg/dL) at 6/11/2025  9:27 AM  Serum Albumin: 3.4 g/dL at 6/11/2025  9:27 AM  INR(ratio): 1 at 6/11/2025  9:27 AM  Age at listing (hypothetical): 59 years  Sex: Female at 6/11/2025  9:27 AM  -- HCC screening: AFP and abd. U/S.. see below  --- Serological workup: see HPI  -- Cirrhosis counseling as noted above and discussed with patient    2.  Metabolic associated steatotic liver disease   -- risk factors for fatty liver: obesity, HTN, HLD, DM  Recommend:  1. Weight loss goal of 25-40 lbs  2. Low carb/sugar, high fiber and protein diet  3. Exercise, 5 days per week, 30 minutes per day, as tolerated  4. Recommend good cholesterol, blood pressure, blood sugar levels   5. Cannot use Rezdiffra with cirrhosis currently     3. Portal hypertension, varices  Continue Coreg 6.25 mg BID, HR near goal, will continue to monitor   -- EGD next due 10/2026, will reassess need for chronic EGD after next one   -- No Ascites or ankle edema   -- + Splenomegaly    4. Elevated AFP, liver lesions   Mildly elevated, MRI and AFP in 3 months     5. Atherosclerosis of aorta noted on MRI  Message sent to pcp     6. Upcoming  hip surgery  No absolute contraindication from a liver standpoint   See below for VOCAL MECCA score  Discussed risk of possible liver decompensation post surgery but risk calculated is low, message sent to ortho            Follow up in about 3 months (around 9/18/2025). with MRI and labs a few days before  Orders Placed This Encounter   Procedures    MRI Abdomen W WO Contrast    CBC Without Differential    Comprehensive Metabolic Panel    Protime-INR    Alpha-Fetoprotein and AFP L-3        Thank you for allowing me to participate in the care of Parris Simms ARABELLA Parker    I spent a total of 30 minutes on the day of the visit.This includes face to face time and non-face to face time preparing to see the patient (eg, review of tests), obtaining and/or reviewing separately obtained history, documenting clinical information in the electronic or other health record, independently interpreting results and communicating results to the patient/family/caregiver, and coordinating care.       CC'ed note to:   Dr. Leigh (ortho)  Reyna Duval MD

## 2025-06-18 NOTE — Clinical Note
Bola Leigh: She saw me today for her cirrhosis f/u. She is well compensated, MELD is near normal and platelets are normal  Discussed risk of possible liver decompensation post surgery but risk calculated per VOCAL MECCA cirrhosis surgical score is low, just ALLI

## 2025-06-18 NOTE — PROGRESS NOTES
Subjective:     @Patient ID: Parris Guevara is a 59 y.o. female.    Chief Complaint: Pre-op Exam    HPI    59 y.o. female here for pre-op evaluation for L hip surgery on 6/30/25 with Dr Leigh    History of prior anesthetic complications: no  Chronic Steroid usage: no  no tobacco, no EtOH, no Illicit substances     Surgical Risk Assessment     Active cardiac issues:  Active decompensated heart failure? No   Unstable angina?  No   Significant uncontrolled arrhythmias? No   Severe valvular heart disease-Aortic or Mitral Stenosis? No   Recent MI or coronary revascularization < 30 days? No     Clinical risk factors predicting perioperative major adverse cardiac events per RCRI  High risk surgery (suprainguinal vascular, intraperitoneal, or intrathoracic surgery)? No   History of CAD/ischemic heart disease? No   History of cerebrovascular disease (CVA or TIA)? No   History of compensated heart failure? No   Type 2 diabetes requiring insulin? No   Serum Creatinine > 2? No   Total cardiac risk factors 0     0 predictors = 0.4%, 1 predictor = 0.9%, 2 predictors = 6.6%, >=3 predictors = >11%    According to the revised cardiac risk index, the risk of tereso-procedural major cardiac complications (cardiac death, nonfatal MI, nonfatal cardiac arrest, postoperative cardiogenic pulmonary edema, complete heart block) is: 0.4     If patient has a low risk of MACE (<1%), proceed to surgery. If the patient is at elevated risk of MACE, then determine functional capacity (pt reported activity or DASI model).     If the patient has moderate, good, or excellent functional capacity (>=4 METs), then proceed to surgery without further evaluation. If patient has poor or unknown functional capacity, will further testing impact decision making or perioperative care? If yes, then pharmacological stress testing is appropriate. In those patients with unknown functional capacity, exercise stress testing may be reasonable to perform.      Patient's functional mets: > 4 mets    < 4 METs -unable to walk > 2 blocks on level ground without stopping due to symptoms  - eating, dressing, toileting, walking indoors, light housework. POOR   > 4 METs -climbing > 1 flight of stairs without stopping  -walking up hill > 1-2 blocks  -scrubbing floors  -moving furniture  - golf, bowling, dancing or tennis  -running short distance MODERATE to EXCELLENT     OR   [email or web address]      Review of Systems   Constitutional:  Negative for chills and fever.   Respiratory:  Negative for shortness of breath.    Cardiovascular:  Negative for chest pain and palpitations.   Psychiatric/Behavioral:  Negative for agitation and confusion.      Past medical history, surgical history, and family medical history reviewed and updated as appropriate.    Medications and allergies reviewed.     Objective:     There were no vitals filed for this visit.  There is no height or weight on file to calculate BMI.  Physical Exam  Constitutional:       Appearance: Normal appearance.   HENT:      Head: Normocephalic and atraumatic.      Right Ear: Tympanic membrane normal.      Left Ear: Tympanic membrane normal.      Mouth/Throat:      Mouth: Mucous membranes are moist.      Pharynx: No oropharyngeal exudate.   Eyes:      General:         Right eye: No discharge.         Left eye: No discharge.      Conjunctiva/sclera: Conjunctivae normal.   Cardiovascular:      Rate and Rhythm: Normal rate and regular rhythm.      Heart sounds: No murmur heard.  Pulmonary:      Effort: Pulmonary effort is normal.      Breath sounds: Normal breath sounds.   Abdominal:      General: Bowel sounds are normal. There is no distension.      Palpations: Abdomen is soft.      Tenderness: There is no abdominal tenderness.   Musculoskeletal:         General: Normal range of motion.      Cervical back: Normal range of motion and neck supple.      Right lower leg: No edema.      Left  lower leg: No edema.   Skin:     General: Skin is warm and dry.   Neurological:      Mental Status: She is alert and oriented to person, place, and time.   Psychiatric:         Mood and Affect: Mood normal.         Behavior: Behavior normal.         Lab Results   Component Value Date    WBC 5.91 06/11/2025    HGB 12.0 06/11/2025    HCT 38.3 06/11/2025     06/11/2025    CHOL 208 (H) 04/25/2025    TRIG 107 04/25/2025    HDL 46 04/25/2025    ALT 23 06/11/2025    AST 29 06/11/2025     06/11/2025    K 4.6 06/11/2025     06/11/2025    CREATININE 0.9 06/11/2025    BUN 16 06/11/2025    CO2 24 06/11/2025    TSH 3.357 04/25/2025    INR 1.0 06/11/2025    HGBA1C 5.4 06/11/2025       Assessment:     1. Preop examination    2. Hypertension associated with type 2 diabetes mellitus    3. Type 2 diabetes mellitus without complication, without long-term current use of insulin      Plan:   Tia was seen today for pre-op exam.    Diagnoses and all orders for this visit:    Preop examination  Patient is low cardiopulmonary risk for low to intermediate risk procedure.  OK to proceed with surgery as planned.  -     IN OFFICE EKG 12-LEAD (to [place])    Hypertension associated with type 2 diabetes mellitus  - Stable. Continue home meds     Type 2 diabetes mellitus without complication, without long-term current use of insulin  - diabetes is controlled.  Patient is holding GLP 1 for 2 weeks prior to procedure.           Reyna Duval MD  Internal Medicine    6/18/2025

## 2025-06-18 NOTE — PATIENT INSTRUCTIONS
Metabolic associated steatotic liver disease   These things are important to improve steatosis:  Limit alcohol consumption, none given cirrhosis   2 Weight loss goal of 25 lbs. ok to use weight loss medications to help with weight loss from a liver standpoint if you don't have any other contraindications   3. [facility] offers benefits to patients so let me know if you want a referral to the [facility] gym. Also, [facility] has dieticians that can create a weight loss plan. If you are interested let me know and I can place a referral. If so, contact the dietician team at [facility] at email [email or web address] or call [phone].  to get scheduled. They do offer video visits   4. Avoid processed foods. No fried/fast foods. No sugary drinks or drinks with any calories.   5. Low carb/sugar and high protein diet (110 grams per day of protein).Try to limit your carb intake to LESS than  grams per day total. Use MyFitness Pal or Lose It thaddeus to add up your carbs through the day. Do NOT drink any beverages with calories or carbs (this can lead to high blood sugar and weight gain). The main thing to focus on is high protein, low carb)  6. Exercise, 5 days per week, 30 minutes per day, as tolerated  7. Recommend good cholesterol, blood pressure, blood sugar levels   8. There is a new medication called Rezdiffra for the treatment of F2-F3 liver scarring due to fatty liver. It is only indicated for patients with significant scar tissue from fatty liver (cannot use with cirrhosis currently)    In some people, fatty liver can progress to steatohepatitis (inflamatory fatty liver) and possibly to cirrhosis, increasing the risk for liver cancer, liver failure, and death. Therefore, the lifestyle changes are very important to decrease this risk.     Helpful website for MAS/fatty liver: [email or web address]     CIRRHOSIS   This is a web site that you may  find helpful about cirrhosis : [email or web address].ca/    Because you have cirrhosis, it is important to attend clinic visits every 6 months with an Ultrasound and blood tests every 6 months to screen for liver cancer (you are at risk of developing liver cancer due to scar tissue in the liver)    Signs and symptoms of worsening liver disease include jaundice, fluid in the belly (ascites), and confusion/disorientation/slowed thought processes due to hepatic encephalopathy (toxins building up because of liver problems).   You should seek medical attention if any of these things occur.    Also, possible bleeding from esophageal varices (blood vessels in the stomach and foodpipe can burst and cause fatal bleeding).  Therefore, if you have symptoms of vomiting blood, blood in your stool, dark or black stools or vomiting coffee ground vomit, YOU SHOULD GO TO THE EMERGENCY ROOM IMMEDIATELY.     Cirrhosis can increase the risk of liver cancer, liver failure, and death. However, we will watch your liver function score (MELD score) closely with each clinic visit. A normal MELD score is 6, highest is 40. Your last one was an 8. We will check this with every clinic visit. A MELD 15 or higher is when we start to consider transplant because MELD 15 or higher indicates that the liver is not functioning as well     Cirrhosis Counseling  - NO alcohol use (includes beer, wine, and/or liquor)  - avoid non-steroidal anti-inflammatory drugs (NSAIDs) such as ibuprofen, Motrin, naprosyn, Alleve due to the risk of kidney damage  - can take acetaminophen (Tylenol), no more than 2000 mg per day  - low sodium (salt) 2 gram per day diet  - high protein diet: 11- grams per day to prevent muscle mass loss. Drink at least 1 protein shake daily (Premier Protein is best option because it is very high protein and low sugar). Ok to use this as nighttime snack to fit it in   - resistance exercises for muscle strength  - avoid raw seafoods due to the  risk of fatal Vibrio vulnificus infection  - ultrasound of the liver every 6 months for liver cancer screening (you are at risk of developing liver cancer due to scar tissue in the liver)  - Upper endoscopy every 1-2 years to screen for varices in the stomach and foodpipe which can burst and cause fatal bleeding

## 2025-06-20 ENCOUNTER — RESULTS FOLLOW-UP (OUTPATIENT)
Dept: INTERNAL MEDICINE | Facility: CLINIC | Age: 60
End: 2025-06-20

## 2025-06-30 ENCOUNTER — TELEPHONE (OUTPATIENT)
Dept: ORTHOPEDICS | Facility: CLINIC | Age: 60
End: 2025-06-30
Payer: MEDICAID

## 2025-07-02 ENCOUNTER — ANESTHESIA (OUTPATIENT)
Dept: SURGERY | Facility: HOSPITAL | Age: 60
End: 2025-07-02
Payer: MEDICAID

## 2025-07-02 ENCOUNTER — HOSPITAL ENCOUNTER (OUTPATIENT)
Facility: HOSPITAL | Age: 60
Discharge: HOME HEALTH | End: 2025-07-05
Attending: ORTHOPAEDIC SURGERY | Admitting: ORTHOPAEDIC SURGERY
Payer: MEDICAID

## 2025-07-02 ENCOUNTER — ANESTHESIA EVENT (OUTPATIENT)
Dept: SURGERY | Facility: HOSPITAL | Age: 60
End: 2025-07-02
Payer: MEDICAID

## 2025-07-02 DIAGNOSIS — M16.12 PRIMARY OSTEOARTHRITIS OF LEFT HIP: ICD-10-CM

## 2025-07-02 DIAGNOSIS — Z96.642 S/P TOTAL LEFT HIP ARTHROPLASTY: ICD-10-CM

## 2025-07-02 DIAGNOSIS — G89.18 POSTOPERATIVE PAIN: ICD-10-CM

## 2025-07-02 DIAGNOSIS — M16.12 PRIMARY OSTEOARTHRITIS OF LEFT HIP: Primary | ICD-10-CM

## 2025-07-02 DIAGNOSIS — Z96.642 HISTORY OF LEFT HIP REPLACEMENT: Primary | ICD-10-CM

## 2025-07-02 LAB — POCT GLUCOSE: 97 MG/DL (ref 70–110)

## 2025-07-02 PROCEDURE — 27130 TOTAL HIP ARTHROPLASTY: CPT | Mod: LT,,, | Performed by: ORTHOPAEDIC SURGERY

## 2025-07-02 PROCEDURE — 25000003 PHARM REV CODE 250: Performed by: ORTHOPAEDIC SURGERY

## 2025-07-02 PROCEDURE — 63600175 PHARM REV CODE 636 W HCPCS: Performed by: ANESTHESIOLOGY

## 2025-07-02 PROCEDURE — 71000039 HC RECOVERY, EACH ADD'L HOUR: Performed by: ORTHOPAEDIC SURGERY

## 2025-07-02 PROCEDURE — 37000008 HC ANESTHESIA 1ST 15 MINUTES: Performed by: ORTHOPAEDIC SURGERY

## 2025-07-02 PROCEDURE — 63600175 PHARM REV CODE 636 W HCPCS: Performed by: ORTHOPAEDIC SURGERY

## 2025-07-02 PROCEDURE — 94761 N-INVAS EAR/PLS OXIMETRY MLT: CPT

## 2025-07-02 PROCEDURE — 25000003 PHARM REV CODE 250: Performed by: NURSE ANESTHETIST, CERTIFIED REGISTERED

## 2025-07-02 PROCEDURE — 36000710: Performed by: ORTHOPAEDIC SURGERY

## 2025-07-02 PROCEDURE — 71000033 HC RECOVERY, INTIAL HOUR: Performed by: ORTHOPAEDIC SURGERY

## 2025-07-02 PROCEDURE — C1776 JOINT DEVICE (IMPLANTABLE): HCPCS | Performed by: ORTHOPAEDIC SURGERY

## 2025-07-02 PROCEDURE — 36000711: Performed by: ORTHOPAEDIC SURGERY

## 2025-07-02 PROCEDURE — 27201423 OPTIME MED/SURG SUP & DEVICES STERILE SUPPLY: Performed by: ORTHOPAEDIC SURGERY

## 2025-07-02 PROCEDURE — 37000009 HC ANESTHESIA EA ADD 15 MINS: Performed by: ORTHOPAEDIC SURGERY

## 2025-07-02 PROCEDURE — 99900035 HC TECH TIME PER 15 MIN (STAT)

## 2025-07-02 PROCEDURE — 27130 TOTAL HIP ARTHROPLASTY: CPT | Mod: AS,LT,,

## 2025-07-02 PROCEDURE — 63600175 PHARM REV CODE 636 W HCPCS: Performed by: NURSE ANESTHETIST, CERTIFIED REGISTERED

## 2025-07-02 PROCEDURE — 27000221 HC OXYGEN, UP TO 24 HOURS

## 2025-07-02 DEVICE — STEM FEMORAL ACCOL SZ 4 35X105: Type: IMPLANTABLE DEVICE | Site: HIP | Status: FUNCTIONAL

## 2025-07-02 DEVICE — HEAD FEMORAL V40 +0X36MM COCR: Type: IMPLANTABLE DEVICE | Site: HIP | Status: FUNCTIONAL

## 2025-07-02 DEVICE — SHELL TRIDENT II ACET E 52MM: Type: IMPLANTABLE DEVICE | Site: HIP | Status: FUNCTIONAL

## 2025-07-02 DEVICE — IMPLANTABLE DEVICE: Type: IMPLANTABLE DEVICE | Site: HIP | Status: FUNCTIONAL

## 2025-07-02 RX ORDER — OXYCODONE HYDROCHLORIDE 10 MG/1
10 TABLET ORAL
Status: DISCONTINUED | OUTPATIENT
Start: 2025-07-02 | End: 2025-07-04

## 2025-07-02 RX ORDER — SODIUM CHLORIDE 0.9 % (FLUSH) 0.9 %
5 SYRINGE (ML) INJECTION
Status: DISCONTINUED | OUTPATIENT
Start: 2025-07-02 | End: 2025-07-05 | Stop reason: HOSPADM

## 2025-07-02 RX ORDER — SODIUM CHLORIDE 0.9 % (FLUSH) 0.9 %
3 SYRINGE (ML) INJECTION EVERY 8 HOURS
Status: DISCONTINUED | OUTPATIENT
Start: 2025-07-02 | End: 2025-07-02 | Stop reason: HOSPADM

## 2025-07-02 RX ORDER — ONDANSETRON HYDROCHLORIDE 2 MG/ML
INJECTION, SOLUTION INTRAVENOUS
Status: DISCONTINUED | OUTPATIENT
Start: 2025-07-02 | End: 2025-07-02

## 2025-07-02 RX ORDER — HYDROMORPHONE HYDROCHLORIDE 1 MG/ML
0.5 INJECTION, SOLUTION INTRAMUSCULAR; INTRAVENOUS; SUBCUTANEOUS EVERY 6 HOURS PRN
Status: DISCONTINUED | OUTPATIENT
Start: 2025-07-02 | End: 2025-07-03

## 2025-07-02 RX ORDER — OXYCODONE AND ACETAMINOPHEN 5; 325 MG/1; MG/1
1 TABLET ORAL EVERY 6 HOURS PRN
Qty: 45 TABLET | Refills: 0 | Status: SHIPPED | OUTPATIENT
Start: 2025-07-02

## 2025-07-02 RX ORDER — ONDANSETRON HYDROCHLORIDE 2 MG/ML
4 INJECTION, SOLUTION INTRAVENOUS ONCE AS NEEDED
Status: COMPLETED | OUTPATIENT
Start: 2025-07-02 | End: 2025-07-02

## 2025-07-02 RX ORDER — OXYCODONE HYDROCHLORIDE 5 MG/1
5 TABLET ORAL
Status: DISCONTINUED | OUTPATIENT
Start: 2025-07-02 | End: 2025-07-04

## 2025-07-02 RX ORDER — MELOXICAM 7.5 MG/1
15 TABLET ORAL DAILY
Status: DISCONTINUED | OUTPATIENT
Start: 2025-07-03 | End: 2025-07-03

## 2025-07-02 RX ORDER — OXYCODONE HYDROCHLORIDE 5 MG/1
5 TABLET ORAL
Status: DISCONTINUED | OUTPATIENT
Start: 2025-07-02 | End: 2025-07-02 | Stop reason: HOSPADM

## 2025-07-02 RX ORDER — CEFAZOLIN 2 G/1
2 INJECTION, POWDER, FOR SOLUTION INTRAMUSCULAR; INTRAVENOUS
Status: COMPLETED | OUTPATIENT
Start: 2025-07-02 | End: 2025-07-03

## 2025-07-02 RX ORDER — SODIUM CHLORIDE 0.9 % (FLUSH) 0.9 %
10 SYRINGE (ML) INJECTION
Status: DISCONTINUED | OUTPATIENT
Start: 2025-07-02 | End: 2025-07-02 | Stop reason: HOSPADM

## 2025-07-02 RX ORDER — ONDANSETRON 4 MG/1
4 TABLET, ORALLY DISINTEGRATING ORAL EVERY 6 HOURS PRN
Status: DISCONTINUED | OUTPATIENT
Start: 2025-07-02 | End: 2025-07-05 | Stop reason: HOSPADM

## 2025-07-02 RX ORDER — CALCIUM CARBONATE 200(500)MG
500 TABLET,CHEWABLE ORAL DAILY
Status: DISCONTINUED | OUTPATIENT
Start: 2025-07-03 | End: 2025-07-05 | Stop reason: HOSPADM

## 2025-07-02 RX ORDER — HYDROMORPHONE HYDROCHLORIDE 2 MG/ML
0.5 INJECTION, SOLUTION INTRAMUSCULAR; INTRAVENOUS; SUBCUTANEOUS EVERY 5 MIN PRN
Status: DISCONTINUED | OUTPATIENT
Start: 2025-07-02 | End: 2025-07-02 | Stop reason: HOSPADM

## 2025-07-02 RX ORDER — GLUCAGON 1 MG
1 KIT INJECTION
Status: DISCONTINUED | OUTPATIENT
Start: 2025-07-02 | End: 2025-07-02 | Stop reason: HOSPADM

## 2025-07-02 RX ORDER — ACETAMINOPHEN 500 MG
1000 TABLET ORAL
Status: DISCONTINUED | OUTPATIENT
Start: 2025-07-02 | End: 2025-07-02

## 2025-07-02 RX ORDER — CEFAZOLIN 2 G/1
2 INJECTION, POWDER, FOR SOLUTION INTRAMUSCULAR; INTRAVENOUS
Status: DISCONTINUED | OUTPATIENT
Start: 2025-07-02 | End: 2025-07-02

## 2025-07-02 RX ORDER — PROPOFOL 10 MG/ML
VIAL (ML) INTRAVENOUS CONTINUOUS PRN
Status: DISCONTINUED | OUTPATIENT
Start: 2025-07-02 | End: 2025-07-02

## 2025-07-02 RX ORDER — POLYETHYLENE GLYCOL 3350 17 G/17G
17 POWDER, FOR SOLUTION ORAL DAILY
Status: DISCONTINUED | OUTPATIENT
Start: 2025-07-03 | End: 2025-07-05 | Stop reason: HOSPADM

## 2025-07-02 RX ORDER — TRANEXAMIC ACID 10 MG/ML
1000 INJECTION, SOLUTION INTRAVENOUS
Status: DISCONTINUED | OUTPATIENT
Start: 2025-07-02 | End: 2025-07-02 | Stop reason: HOSPADM

## 2025-07-02 RX ORDER — BISACODYL 10 MG/1
10 SUPPOSITORY RECTAL DAILY PRN
Status: DISCONTINUED | OUTPATIENT
Start: 2025-07-02 | End: 2025-07-05 | Stop reason: HOSPADM

## 2025-07-02 RX ORDER — HYDROMORPHONE HYDROCHLORIDE 2 MG/ML
0.5 INJECTION, SOLUTION INTRAMUSCULAR; INTRAVENOUS; SUBCUTANEOUS EVERY 5 MIN PRN
Status: DISCONTINUED | OUTPATIENT
Start: 2025-07-02 | End: 2025-07-02

## 2025-07-02 RX ORDER — SODIUM CHLORIDE 9 MG/ML
INJECTION, SOLUTION INTRAVENOUS CONTINUOUS
Status: DISCONTINUED | OUTPATIENT
Start: 2025-07-02 | End: 2025-07-02

## 2025-07-02 RX ORDER — PROCHLORPERAZINE EDISYLATE 5 MG/ML
5 INJECTION INTRAMUSCULAR; INTRAVENOUS ONCE
Status: COMPLETED | OUTPATIENT
Start: 2025-07-02 | End: 2025-07-02

## 2025-07-02 RX ORDER — ACETAMINOPHEN 500 MG
1000 TABLET ORAL 3 TIMES DAILY
Status: DISCONTINUED | OUTPATIENT
Start: 2025-07-02 | End: 2025-07-05 | Stop reason: HOSPADM

## 2025-07-02 RX ORDER — FENTANYL CITRATE 50 UG/ML
INJECTION, SOLUTION INTRAMUSCULAR; INTRAVENOUS
Status: DISCONTINUED | OUTPATIENT
Start: 2025-07-02 | End: 2025-07-02

## 2025-07-02 RX ORDER — MIDAZOLAM HYDROCHLORIDE 1 MG/ML
INJECTION INTRAMUSCULAR; INTRAVENOUS
Status: DISCONTINUED | OUTPATIENT
Start: 2025-07-02 | End: 2025-07-02

## 2025-07-02 RX ORDER — GLUCAGON 1 MG
1 KIT INJECTION
Status: DISCONTINUED | OUTPATIENT
Start: 2025-07-02 | End: 2025-07-02

## 2025-07-02 RX ORDER — SODIUM CHLORIDE 0.9 % (FLUSH) 0.9 %
10 SYRINGE (ML) INJECTION
Status: DISCONTINUED | OUTPATIENT
Start: 2025-07-02 | End: 2025-07-02

## 2025-07-02 RX ORDER — TRANEXAMIC ACID 1 G/10ML
INJECTION, SOLUTION INTRAVENOUS
Status: DISCONTINUED | OUTPATIENT
Start: 2025-07-02 | End: 2025-07-02 | Stop reason: HOSPADM

## 2025-07-02 RX ORDER — AMOXICILLIN 250 MG
1 CAPSULE ORAL 2 TIMES DAILY
Status: DISCONTINUED | OUTPATIENT
Start: 2025-07-02 | End: 2025-07-05 | Stop reason: HOSPADM

## 2025-07-02 RX ORDER — PANTOPRAZOLE SODIUM 40 MG/1
40 TABLET, DELAYED RELEASE ORAL DAILY
Status: DISCONTINUED | OUTPATIENT
Start: 2025-07-03 | End: 2025-07-05 | Stop reason: HOSPADM

## 2025-07-02 RX ORDER — PROPOFOL 10 MG/ML
VIAL (ML) INTRAVENOUS
Status: DISCONTINUED | OUTPATIENT
Start: 2025-07-02 | End: 2025-07-02

## 2025-07-02 RX ORDER — SODIUM CHLORIDE 9 MG/ML
INJECTION, SOLUTION INTRAVENOUS CONTINUOUS
Status: DISCONTINUED | OUTPATIENT
Start: 2025-07-02 | End: 2025-07-03

## 2025-07-02 RX ORDER — CEFAZOLIN 2 G/1
2 INJECTION, POWDER, FOR SOLUTION INTRAMUSCULAR; INTRAVENOUS
Status: COMPLETED | OUTPATIENT
Start: 2025-07-02 | End: 2025-07-02

## 2025-07-02 RX ORDER — VALSARTAN 160 MG/1
320 TABLET ORAL DAILY
Status: DISCONTINUED | OUTPATIENT
Start: 2025-07-03 | End: 2025-07-05 | Stop reason: HOSPADM

## 2025-07-02 RX ORDER — MUPIROCIN 20 MG/G
OINTMENT TOPICAL
Status: DISCONTINUED | OUTPATIENT
Start: 2025-07-02 | End: 2025-07-02 | Stop reason: HOSPADM

## 2025-07-02 RX ORDER — ZOLPIDEM TARTRATE 5 MG/1
5 TABLET ORAL NIGHTLY PRN
Status: DISCONTINUED | OUTPATIENT
Start: 2025-07-02 | End: 2025-07-05 | Stop reason: HOSPADM

## 2025-07-02 RX ORDER — TRANEXAMIC ACID 10 MG/ML
1000 INJECTION, SOLUTION INTRAVENOUS
Status: COMPLETED | OUTPATIENT
Start: 2025-07-02 | End: 2025-07-02

## 2025-07-02 RX ORDER — LIDOCAINE HYDROCHLORIDE 10 MG/ML
1 INJECTION, SOLUTION EPIDURAL; INFILTRATION; INTRACAUDAL; PERINEURAL ONCE
Status: DISCONTINUED | OUTPATIENT
Start: 2025-07-02 | End: 2025-07-02 | Stop reason: HOSPADM

## 2025-07-02 RX ORDER — ONDANSETRON HYDROCHLORIDE 2 MG/ML
4 INJECTION, SOLUTION INTRAVENOUS ONCE AS NEEDED
Status: DISCONTINUED | OUTPATIENT
Start: 2025-07-02 | End: 2025-07-02

## 2025-07-02 RX ORDER — MUPIROCIN 20 MG/G
OINTMENT TOPICAL
Status: DISCONTINUED | OUTPATIENT
Start: 2025-07-02 | End: 2025-07-02

## 2025-07-02 RX ORDER — GABAPENTIN 300 MG/1
300 CAPSULE ORAL 3 TIMES DAILY
Status: DISCONTINUED | OUTPATIENT
Start: 2025-07-02 | End: 2025-07-05 | Stop reason: HOSPADM

## 2025-07-02 RX ORDER — NAPROXEN 500 MG/1
500 TABLET ORAL
Status: DISCONTINUED | OUTPATIENT
Start: 2025-07-02 | End: 2025-07-02

## 2025-07-02 RX ORDER — LIDOCAINE HYDROCHLORIDE 20 MG/ML
INJECTION INTRAVENOUS
Status: DISCONTINUED | OUTPATIENT
Start: 2025-07-02 | End: 2025-07-02

## 2025-07-02 RX ORDER — ACETAMINOPHEN 10 MG/ML
INJECTION, SOLUTION INTRAVENOUS
Status: DISCONTINUED | OUTPATIENT
Start: 2025-07-02 | End: 2025-07-02

## 2025-07-02 RX ORDER — LEVOTHYROXINE SODIUM 125 UG/1
125 TABLET ORAL
Status: DISCONTINUED | OUTPATIENT
Start: 2025-07-03 | End: 2025-07-05 | Stop reason: HOSPADM

## 2025-07-02 RX ORDER — NAPROXEN 500 MG/1
500 TABLET ORAL
Status: COMPLETED | OUTPATIENT
Start: 2025-07-02 | End: 2025-07-02

## 2025-07-02 RX ORDER — NAPROXEN SODIUM 220 MG/1
81 TABLET, FILM COATED ORAL DAILY
Status: DISCONTINUED | OUTPATIENT
Start: 2025-07-03 | End: 2025-07-05 | Stop reason: HOSPADM

## 2025-07-02 RX ORDER — PHENYLEPHRINE HYDROCHLORIDE 10 MG/ML
INJECTION INTRAVENOUS
Status: DISCONTINUED | OUTPATIENT
Start: 2025-07-02 | End: 2025-07-02

## 2025-07-02 RX ORDER — ONDANSETRON HYDROCHLORIDE 2 MG/ML
4 INJECTION, SOLUTION INTRAVENOUS EVERY 6 HOURS PRN
Status: DISCONTINUED | OUTPATIENT
Start: 2025-07-02 | End: 2025-07-05 | Stop reason: HOSPADM

## 2025-07-02 RX ORDER — CARVEDILOL 6.25 MG/1
6.25 TABLET ORAL 2 TIMES DAILY
Status: DISCONTINUED | OUTPATIENT
Start: 2025-07-02 | End: 2025-07-05 | Stop reason: HOSPADM

## 2025-07-02 RX ADMIN — FENTANYL CITRATE 25 MCG: 50 INJECTION INTRAMUSCULAR; INTRAVENOUS at 12:07

## 2025-07-02 RX ADMIN — PROPOFOL 125 MCG/KG/MIN: 10 INJECTION, EMULSION INTRAVENOUS at 12:07

## 2025-07-02 RX ADMIN — ONDANSETRON 4 MG: 2 INJECTION INTRAMUSCULAR; INTRAVENOUS at 04:07

## 2025-07-02 RX ADMIN — GLYCOPYRROLATE 0.2 MG: 0.2 INJECTION, SOLUTION INTRAMUSCULAR; INTRAVITREAL at 01:07

## 2025-07-02 RX ADMIN — PHENYLEPHRINE HYDROCHLORIDE 50 MCG: 10 INJECTION INTRAVENOUS at 01:07

## 2025-07-02 RX ADMIN — PHENYLEPHRINE HYDROCHLORIDE 0.3 MCG/KG/MIN: 10 INJECTION INTRAVENOUS at 01:07

## 2025-07-02 RX ADMIN — HYDROMORPHONE HYDROCHLORIDE 0.5 MG: 2 INJECTION INTRAMUSCULAR; INTRAVENOUS; SUBCUTANEOUS at 03:07

## 2025-07-02 RX ADMIN — PROCHLORPERAZINE EDISYLATE 5 MG: 5 INJECTION INTRAMUSCULAR; INTRAVENOUS at 04:07

## 2025-07-02 RX ADMIN — SODIUM CHLORIDE: 9 INJECTION, SOLUTION INTRAVENOUS at 05:07

## 2025-07-02 RX ADMIN — PHENYLEPHRINE HYDROCHLORIDE 100 MCG: 10 INJECTION INTRAVENOUS at 01:07

## 2025-07-02 RX ADMIN — CEFAZOLIN 2 G: 2 INJECTION, POWDER, FOR SOLUTION INTRAMUSCULAR; INTRAVENOUS at 12:07

## 2025-07-02 RX ADMIN — OXYCODONE HYDROCHLORIDE 10 MG: 10 TABLET ORAL at 03:07

## 2025-07-02 RX ADMIN — LIDOCAINE HYDROCHLORIDE 60 MG: 20 INJECTION, SOLUTION INTRAVENOUS at 12:07

## 2025-07-02 RX ADMIN — FENTANYL CITRATE 25 MCG: 50 INJECTION INTRAMUSCULAR; INTRAVENOUS at 02:07

## 2025-07-02 RX ADMIN — MUPIROCIN: 20 OINTMENT TOPICAL at 11:07

## 2025-07-02 RX ADMIN — MIDAZOLAM HYDROCHLORIDE 2 MG: 1 INJECTION, SOLUTION INTRAMUSCULAR; INTRAVENOUS at 12:07

## 2025-07-02 RX ADMIN — MEPIVACAINE HYDROCHLORIDE 4 ML: 15 INJECTION, SOLUTION EPIDURAL; INFILTRATION at 12:07

## 2025-07-02 RX ADMIN — GABAPENTIN 300 MG: 300 CAPSULE ORAL at 09:07

## 2025-07-02 RX ADMIN — ACETAMINOPHEN 1000 MG: 500 TABLET ORAL at 09:07

## 2025-07-02 RX ADMIN — TRANEXAMIC ACID 1000 MG: 10 INJECTION, SOLUTION INTRAVENOUS at 12:07

## 2025-07-02 RX ADMIN — SENNOSIDES AND DOCUSATE SODIUM 1 TABLET: 50; 8.6 TABLET ORAL at 09:07

## 2025-07-02 RX ADMIN — CARVEDILOL 6.25 MG: 6.25 TABLET, FILM COATED ORAL at 09:07

## 2025-07-02 RX ADMIN — SODIUM CHLORIDE: 0.9 INJECTION, SOLUTION INTRAVENOUS at 01:07

## 2025-07-02 RX ADMIN — ONDANSETRON 4 MG: 2 INJECTION INTRAMUSCULAR; INTRAVENOUS at 05:07

## 2025-07-02 RX ADMIN — CEFAZOLIN 2 G: 2 INJECTION, POWDER, FOR SOLUTION INTRAMUSCULAR; INTRAVENOUS at 09:07

## 2025-07-02 RX ADMIN — PROPOFOL 50 MG: 10 INJECTION, EMULSION INTRAVENOUS at 12:07

## 2025-07-02 RX ADMIN — NAPROXEN 500 MG: 500 TABLET ORAL at 11:07

## 2025-07-02 RX ADMIN — ONDANSETRON 8 MG: 2 INJECTION, SOLUTION INTRAMUSCULAR; INTRAVENOUS at 02:07

## 2025-07-02 RX ADMIN — SODIUM CHLORIDE, SODIUM LACTATE, POTASSIUM CHLORIDE, AND CALCIUM CHLORIDE: .6; .31; .03; .02 INJECTION, SOLUTION INTRAVENOUS at 12:07

## 2025-07-02 NOTE — DISCHARGE INSTRUCTIONS
GENERAL DISCHARGE INSTRUCTIONS:        FOLLOW UP APPOINTMENT:  Call your surgeon's office to schedule your post operative appointment, if one has not already been scheduled.     WEIGHTBEARING:  You may bear as much weight as you can tolerate on your operative leg.  Continue to use a walking device until d/c'd by your physical therapist or doctor.    PAIN MANAGEMENT:  Take your pain medication as prescribed.  Wean yourself off narcotic pain medication slowly, supplementing with acetominophen (Tylenol).  Do not exceed 3000 mg of Tylenol per day.  You may also substitute other over-the-counter pain medications unless your allergic or have been instructed not to do so by your doctor.    SURGICAL DRESSING:  If you are discharged on the day of surgery after knee replacement, remove Ace wrap 24 hours after surgery.  Leave underlying, plastic dressing in place.  For all patients:  Do not change plastic dressing unless it falls off or it appears that it is saturated.     It is normal to see some blood spotting on the dressing.  If the dressing appears to leak or be saturated, visiting nursing may replace it with a similar dressing.  It is also permissible to reinforce it with gauze and tape.     Whenever possible, avoid seeking emergency room and urgent care for dressing related questions and problems.  I and my staff are available Monday through Friday for dressing changes.  Please contact the office by phone or through the portal for questions regarding your dressing    DRIVING:  Do not drive until you have your follow up appointment with your surgeon.    SHOWERING:  You can shower as long as your dressing is intact and your physical therapist has instructed you on how to safely get in and out of your shower.    TO PREVENT BLOOD CLOTS: continue to take aspirin (or other medication) as instructed above.  Also continue to walk frequently throughout the day.    TO PREVENT CONSTIPATION:  continue to take stool softeners  regularly for as long as you are on narcotic pain medication (oxycodone/hydrocodone). If you do not have normal bowel movement for 1-2 days, purchase an over the counter laxative, such as Milk of Magnesia, and follow the instructions on the label. Call your doctor for severe abdominal pain, vomiting or diarrhea.    To PREVENT SWELLING:  This is normal for at least 2 months after surgery. Spend time each day with your leg elevated on several pillows. Use ice as needed. TOMA stockings are helpful for swelling, but MAY be removed, if desired.    NOTIFY YOUR SURGEON IF: Unrelenting pain that does not improve, even after taking prescribed pain medication. Increasing drainage from the wound.

## 2025-07-02 NOTE — NURSING TRANSFER
Nursing Transfer Note      7/2/2025   5:07 PM    Nurse giving handoff:allen mercer  Nurse receiving handoff:rn room 532    Reason patient is being transferred: post op    Transfer To: room 532    Transfer via stretcher    Transfer with 2l O2, abduction pillow on    Transported by transport    Transfer Vital Signs:  Blood Pressure:see flowsheet  Heart Rate:  O2:  Temperature:  Respirations:    Telemetry:   Order for Tele Monitor?     Additional Lines:     Medicines sent:     Any special needs or follow-up needed:     Patient belongings transferred with patient: No    Chart send with patient: Yes    Notified: spouse    Patient reassessed at:  (date, time)  1  Upon arrival to floor:

## 2025-07-02 NOTE — TRANSFER OF CARE
"Anesthesia Transfer of Care Note    Patient: Parris Guevara    Procedure(s) Performed: Procedure(s) (LRB):  ARTHROPLASTY, HIP (Left)    Patient location: PACU    Anesthesia Type: general    Transport from OR: Transported from OR on 6-10 L/min O2 by face mask with adequate spontaneous ventilation    Post pain: adequate analgesia    Post assessment: no apparent anesthetic complications    Post vital signs: stable    Level of consciousness: awake and alert    Nausea/Vomiting: no nausea/vomiting    Complications: none    Transfer of care protocol was followed      Last vitals: Visit Vitals  /76 (BP Location: Right arm, Patient Position: Lying)   Pulse 77   Temp 36.7 °C (98 °F) (Temporal)   Resp 17   Ht 5' 7" (1.702 m)   Wt 93.9 kg (207 lb)   SpO2 100%   Breastfeeding No   BMI 32.42 kg/m²     "

## 2025-07-02 NOTE — ANESTHESIA PREPROCEDURE EVALUATION
[facility]  Anesthesia Pre-Operative Evaluation         Patient Name: Parris Guevara  YOB: 1965  MRN: [id number]    SUBJECTIVE:     Pre-operative evaluation for Procedure(s) (LRB):  ARTHROPLASTY, HIP (Left)     07/02/2025    Parris Guevara is a 59 y.o. 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    Patient now presents for the above procedure(s).    Results for orders placed during the hospital encounter of 05/27/25  Echo    Left Ventricle: The left ventricle is normal in size. Ventricular mass is normal. Normal wall thickness. There is concentric remodeling. Normal wall motion. There is normal systolic function with a visually estimated ejection fraction of 55 - 60%. Grade II diastolic dysfunction. Elevated left ventricular filling pressure.    Right Ventricle: The right ventricle is normal in size Wall thickness is normal. Systolic function is normal.    Aortic Valve: There is mild aortic valve sclerosis. There is moderate annular calcification present.    Tricuspid Valve: There is mild regurgitation.    Pulmonary Artery: The estimated pulmonary artery systolic pressure is 21 mmHg.    IVC/SVC: Normal venous pressure at 3 mmHg.      Prev airway: 9/12/2022    Induction:  Intravenous    Intubated:  Postinduction    Attempted By:  CRNA    Method of Intubation:  Video laryngoscopy    Blade:  Adrianna 3    Laryngeal View Grade: Grade I - full view of cords      Difficult Airway Encountered?: No      Complications:  None    Airway Device:  Oral endotracheal tube    Airway Device Size:  7.5    Style/Cuff Inflation:  Cuffed    Inflation Amount (mL):  4    Tube secured:  22    Secured at:  The lips    Placement Verified By:  Capnometry    Complicating Factors:  None    Findings Post-Intubation:  BS  equal bilateral         Problem List[1]    Review of patient's allergies indicates:  No Known Allergies    Current Inpatient Medications:   acetaminophen  1,000 mg Oral Once Pre-Op    LIDOcaine (PF) 10 mg/ml (1%)  1 mL Intradermal Once    sodium chloride 0.9%  3 mL Intravenous Q8H    sodium chloride 0.9%  3 mL Intravenous Q8H       Medications Ordered Prior to Encounter[2]    Past Surgical History:   Procedure Laterality Date    ARTHROPLASTY OF SHOULDER Right     2024    COLONOSCOPY N/A 11/16/2022    Procedure: COLONOSCOPY;  Surgeon: Obed Duran MD;  Location: [facility] (2ND FLR);  Service: Endoscopy;  Laterality: N/A;  per referral entered by Dr. Reyna Duval for screening colonosocpy to be scheduled with EGD/ EGD and colon combined-ERW  Labs last on 10/19/22 and 11/1/22-ERW  prep ins. on portal - ERW    ESOPHAGOGASTRODUODENOSCOPY N/A 11/16/2022    Procedure: ESOPHAGOGASTRODUODENOSCOPY (EGD);  Surgeon: Obed Duran MD;  Location: [facility] (2ND FLR);  Service: Endoscopy;  Laterality: N/A;    ESOPHAGOGASTRODUODENOSCOPY N/A 10/17/2023    Procedure: EGD (ESOPHAGOGASTRODUODENOSCOPY);  Surgeon: Birgit Peralta MD;  Location: [facility] (4TH FLR);  Service: Endoscopy;  Laterality: N/A;  Ins. to portal. Doabetic takes Ozempic. Cirrhosis: labs ordered. Tbou  referral: Babak BECKHAM  10/10-precall complete-pt verbalized understandingof holding Ozempic-MS    ESOPHAGOGASTRODUODENOSCOPY N/A 10/16/2024    Procedure: EGD (ESOPHAGOGASTRODUODENOSCOPY);  Surgeon: Jose Carlos Villatoro MD;  Location: [facility] (4TH FLR);  Service: Gastroenterology;  Laterality: N/A;  Ref by CHAPINCITO Hilliard, Labs am procedure, Ozempic, portal - PC  10/9 precall complete, labs at 0930, pt states she's been off Ozempic in 2 months, portal-ml    ROBOT-ASSISTED LAPAROSCOPIC LYSIS OF ADHESIONS USING DA REED XI N/A 09/12/2022    Procedure: XI ROBOTIC LYSIS, ADHESIONS;  Surgeon: Silvestre Umaña MD;  Location: [facility];  Service: Oncology;  Laterality: N/A;     THYROIDECTOMY, PARTIAL      XI ROBOTIC HYSTERECTOMY, WITH SALPINGO-OOPHORECTOMY Bilateral 09/12/2022    Procedure: XI ROBOTIC HYSTERECTOMY,WITH SALPINGO-OOPHORECTOMY;  Surgeon: Silvestre Umaña MD;  Location: [facility];  Service: Oncology;  Laterality: Bilateral;       Social History[3]    OBJECTIVE:     Vital Signs Range (Last 24H):         Significant Labs:  Lab Results   Component Value Date    WBC 5.91 06/11/2025    HGB 12.0 06/11/2025    HCT 38.3 06/11/2025     06/11/2025    CHOL 208 (H) 04/25/2025    TRIG 107 04/25/2025    HDL 46 04/25/2025    ALT 23 06/11/2025    AST 29 06/11/2025     06/11/2025    K 4.6 06/11/2025     06/11/2025    CREATININE 0.9 06/11/2025    BUN 16 06/11/2025    CO2 24 06/11/2025    TSH 3.357 04/25/2025    INR 1.0 06/11/2025    HGBA1C 5.4 06/11/2025       Diagnostic Studies: No relevant studies.    EKG:   Results for orders placed or performed in visit on 06/18/25   IN OFFICE EKG 12-LEAD (to [place])    Collection Time: 06/18/25  8:20 AM   Result Value Ref Range    QRS Duration 86 ms    [facility] QTC Calculation 475 ms    Narrative    Test Reason : Z01.818,    Vent. Rate :  77 BPM     Atrial Rate :  77 BPM     P-R Int : 196 ms          QRS Dur :  86 ms      QT Int : 420 ms       P-R-T Axes :  33  11  32 degrees    QTcB Int : 475 ms    Normal sinus rhythm  Normal ECG  When compared with ECG of 28-Apr-2025 12:15,  No significant change was found  Confirmed by Marcelino Ibarra ([id number]) on 6/18/2025 3:32:34 PM    Referred By:            Confirmed By: Marcelino Ibarra       2D ECHO:  TTE:  Results for orders placed or performed during the hospital encounter of 05/27/25   Echo   Result Value Ref Range    LV Diastolic Volume 107 mL    Echo EF Estimated 83 %    LV Systolic Volume 19 mL    IVS 0.7 0.6 - 1.1 cm    LVIDd 4.8 3.5 - 6.0 cm    LVIDs 2.3 2.1 - 4.0 cm    LVOT diameter 2.1 cm    PW 1.0 0.6 - 1.1 cm    IVRT 105 ms    AV LVOT peak gradient 5 mmHg    LVOT mn grad 3 mmHg    LVOT peak dalton 1.1  "m/s    LVOT peak VTI 24.7 cm    RV- [place] basal diam 3.4 cm    RV S' 12.55 cm/s    LA size 4.0 cm    Left Atrium Major Axis 5.0 cm    Left Atrium Minor Axis 4.7 cm    LA Vol (MOD) 48 mL    RA Major Axis 4.11 cm    RA Area 12.1 cm2    AV valve area 3.1 cm2    AV area by cont VTI 3.1 cm2    AV peak gradient 8 mmHg    AV mean gradient 5 mmHg    Ao peak mike 1.4 m/s    Ao VTI 27.4 cm    MV Peak A Mike 1.14 m/s    E wave deceleration time 210 ms    MV Peak E Mike 0.79 m/s    E/A ratio 0.69     LV LATERAL E/E' RATIO 13.2     LV SEPTAL E/E' RATIO 26.3     TDI LATERAL 0.06 m/s    TDI SEPTAL 0.03 m/s    TV peak gradient 17 mmHg    TR Max Mike 2.1 m/s    Ascending aorta 3.3 cm    STJ 2.7 cm    P vein A 0.2 m/s    MV "A" wave duration 112.27 ms    Pulm vein "A" wave 112.27 ms    PV Peak D Mike 0.33 m/s    PV Peak S Mike 0.46 m/s    IVC diameter 1.06 cm    Sinus 3.03 cm    RA Width 4.03 cm    TAPSE 1.8 cm    LA WIDTH 4.5 cm    BSA 2.19 m2    LVOT stroke volume 85.5 cm3    LV Systolic Volume Index 9.0 mL/m2    LV Diastolic Volume Index 50.47 mL/m2    LVOT area 3.5 cm2    FS 52.1 (A) 28 - 44 %    Left Ventricle Relative Wall Thickness 0.42 cm    LV mass 137.1 g    LV Mass Index 64.7 g/m2    E/E' ratio 18 m/s    OSMAR 35 mL/m2    LA Vol 74 cm3    Pulm vein S/D ratio 1.39     RV/LV Ratio 0.71 cm    OSMAR (MOD) 23 mL/m2    AV Velocity Ratio 0.79     AV index (prosthetic) 0.90     CARLA by Velocity Ratio 2.7 cm²    ASI 1.4 cm/m2    ASI 1.6 cm/m2    Mean e' 0.05 m/s    ZLVIDS -4.55     ZLVIDD -3.33     TV resting pulmonary artery pressure 21 mmHg    RV TB RVSP 5 mmHg    Est. RA pres 3 mmHg    Narrative      Left Ventricle: The left ventricle is normal in size. Ventricular mass   is normal. Normal wall thickness. There is concentric remodeling. Normal   wall motion. There is normal systolic function with a visually estimated   ejection fraction of 55 - 60%. Grade II diastolic dysfunction. Elevated   left ventricular filling pressure.    Right " Ventricle: The right ventricle is normal in size Wall thickness   is normal. Systolic function is normal.    Aortic Valve: There is mild aortic valve sclerosis. There is moderate   annular calcification present.    Tricuspid Valve: There is mild regurgitation.    Pulmonary Artery: The estimated pulmonary artery systolic pressure is   21 mmHg.    IVC/SVC: Normal venous pressure at 3 mmHg.         KADEEM:  No results found for this or any previous visit.    ASSESSMENT/PLAN:           Pre-op Assessment    I have reviewed the Patient Summary Reports.     I have reviewed the Nursing Notes. I have reviewed the NPO Status.   I have reviewed the Medications.     Review of Systems  Anesthesia Hx:  No problems with previous Anesthesia   History of prior surgery of interest to airway management or planning:          Denies Family Hx of Anesthesia complications.    Denies Personal Hx of Anesthesia complications.                    Social:  Non-Smoker, No Alcohol Use       Hematology/Oncology:       -- Denies Anemia:                                  Cardiovascular:     Denies Pacemaker. Hypertension   Denies MI.  Denies CAD.    Denies CABG/stent.     Denies CHF.         Patient on beta blockers Beta blocker taken in last 24 hours                         Pulmonary:    Denies COPD.  Denies Asthma.                    Renal/:   Denies Chronic Renal Disease.                Hepatic/GI:      Denies GERD. Liver Disease,   Taking GLP-1 Agonists Instructed to Hold for 7 Days No Reported GI Symptoms          Musculoskeletal:  Arthritis               Neurological:    Denies CVA.    Denies Seizures.                                Endocrine:  Diabetes, type 2 Hypothyroidism        Obesity / BMI > 30      Physical Exam  General: Cooperative, Alert and Oriented    Airway:  Mallampati: IV   Mouth Opening: Normal  TM Distance: Normal  Tongue: Normal  Neck ROM: Normal ROM    Dental:  Intact    Chest/Lungs:  Normal Respiratory Rate    Heart:  Rate:  Normal  Rhythm: Regular Rhythm        Anesthesia Plan  Type of Anesthesia, risks & benefits discussed:    Anesthesia Type: Gen ETT, Spinal  Intra-op Monitoring Plan: Standard ASA Monitors  Post Op Pain Control Plan: multimodal analgesia and IV/PO Opioids PRN  Induction:  IV  Airway Plan: Direct and Video, Post-Induction  Informed Consent: Informed consent signed with the Patient and all parties understand the risks and agree with anesthesia plan.  All questions answered.   ASA Score: 3  Day of Surgery Review of History & Physical: H&P Update referred to the surgeon/provider.    Ready For Surgery From Anesthesia Perspective.     .           [1]   Patient Active Problem List  Diagnosis    Primary osteoarthritis of both knees    Plaque psoriasis    Subacromial bursitis of right shoulder joint    Type 2 diabetes mellitus without complication, without long-term current use of insulin    Postoperative hypothyroidism    Gallstones    Psoriatic arthritis    Pelvic mass    Hypertension associated with type 2 diabetes mellitus    Hyperlipidemia associated with type 2 diabetes mellitus    Nausea and vomiting    Cyst of left ovary    S/P RA-TLH/BSO    Portal hypertension    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    Secondary esophageal varices without bleeding    Vitamin D deficiency    Class 2 severe obesity due to excess calories with serious comorbidity and body mass index (BMI) of 35.0 to 35.9 in adult    Liver lesion   [2]   No current facility-administered medications on file prior to encounter.     Current Outpatient Medications on File Prior to Encounter   Medication Sig Dispense Refill    calcium carbonate (OS-EDITA) 500 mg calcium (1,250 mg) tablet Take 2 tablets by mouth once daily.      carvediloL (COREG) 6.25 MG tablet Take 1 tablet (6.25 mg total) by mouth 2 (two) times daily. DOSE INCREASE 60 tablet 6    ergocalciferol (ERGOCALCIFEROL) 50,000 unit Cap Take 1 capsule (50,000 Units total) by mouth every 7 days. 12 capsule  3    gabapentin (NEURONTIN) 300 MG capsule Take 300 mg by mouth 3 (three) times daily.      HUMIRA PEN PnKt injection Inject 40 mg into the skin every 14 (fourteen) days.      lactulose (CHRONULAC) 20 gram/30 mL Soln Take 15 mLs (10 g total) by mouth 2 (two) times daily as needed. 1000 mL 5    levothyroxine (SYNTHROID) 125 MCG tablet TAKE 1 TABLET BY MOUTH EVERY DAY 90 tablet 3    meloxicam (MOBIC) 15 MG tablet Take 15 mg by mouth.      pantoprazole (PROTONIX) 40 MG tablet Take 1 tablet (40 mg total) by mouth once daily. 90 tablet 3    semaglutide (OZEMPIC) 0.25 mg or 0.5 mg (2 mg/3 mL) pen injector Inject 0.5 mg into the skin every 7 days. 9 mL 2    triamcinolone acetonide 0.1% (KENALOG) 0.1 % cream APPLY TO AFFECTED AREA TWICE A DAY DO NOT APPLY TO FACE, GROIN OR UNDERARM 80 g 1    valsartan (DIOVAN) 320 MG tablet TAKE 1 TABLET BY MOUTH EVERY DAY 90 tablet 3   [3]   Social History  Socioeconomic History    Marital status:     Number of children: 3   Occupational History    Occupation: Retired. Former Nurse.   Tobacco Use    Smoking status: Never    Smokeless tobacco: Never   Substance and Sexual Activity    Alcohol use: Never    Drug use: Never    Sexual activity: Not Currently     Partners: Male   Social History Narrative    ** Merged History Encounter **          Social Drivers of Health     Food Insecurity: No Food Insecurity (9/9/2024)    Received from [facility]    Hunger Vital Sign     Worried About Running Out of Food in the Last Year: Never true     Ran Out of Food in the Last Year: Never true   Transportation Needs: No Transportation Needs (9/9/2024)    Received from [facility]    PRAPARE - Transportation     Lack of Transportation (Medical): No     Lack of Transportation (Non-Medical): No   Housing Stability: Low Risk  (9/9/2024)    Received from [facility]    Housing Stability Vital Sign     Unable to Pay for Housing in the Last Year: No     Number of Places Lived in the Last Year: 0      Unstable Housing in the Last Year: No

## 2025-07-02 NOTE — ANESTHESIA POSTPROCEDURE EVALUATION
Anesthesia Post Evaluation    Patient: Parris Guevara    Procedure(s) Performed: Procedure(s) (LRB):  ARTHROPLASTY, HIP (Left)    Final Anesthesia Type: spinal      Patient location during evaluation: PACU  Patient participation: Yes- Able to Participate  Level of consciousness: awake and alert  Post-procedure vital signs: reviewed and stable  Pain management: adequate  Airway patency: patent    PONV status at discharge: No PONV  Anesthetic complications: no      Cardiovascular status: blood pressure returned to baseline and hemodynamically stable  Respiratory status: unassisted  Hydration status: euvolemic  Follow-up not needed.          Vitals Value Taken Time   /73 07/02/25 16:12   Temp 36.5 °C (97.7 °F) 07/02/25 15:37   Pulse 72 07/02/25 16:22   Resp 17 07/02/25 15:55   SpO2 99 % 07/02/25 16:22   Vitals shown include unfiled device data.      No case tracking events are documented in the log.      Pain/Charlie Score: Pain Rating Prior to Med Admin: 7 (7/2/2025  3:37 PM)  Charlie Score: 7 (7/2/2025  3:10 PM)

## 2025-07-02 NOTE — DISCHARGE SUMMARY
[facility] - Surgery (Hospital)  Discharge Note  Short Stay    Procedure(s) (LRB):  ARTHROPLASTY, HIP (Left)      OUTCOME: Patient tolerated treatment/procedure well without complication and is now ready for discharge.    DISPOSITION: Home-Health Care [facility]    FINAL DIAGNOSIS:  History of left hip replacement    FOLLOWUP: In clinic    DISCHARGE INSTRUCTIONS:    Discharge Procedure Orders   Diet diabetic     Ice to affected area     Leave dressing on - Keep it clean, dry, and intact until clinic visit   Order Comments: The patient may shower with intact dressing    If the dressing becomes loose or saturated please replace with similar, occlusive dressing.     Activity as tolerated        TIME SPENT ON DISCHARGE: 10 minutes

## 2025-07-02 NOTE — NURSING
Received report from Sanchez, received the patient via stretcher with 4 person assist to transfer to bed, family at bedside, oriented to the room and call light system.

## 2025-07-02 NOTE — OP NOTE
INDICATIONS/PREOPERATIVE DIAGNOSIS: Osteoarthritis of the left hip.    POSTOPERATIVE DIAGNOSIS: Same.    DATE OF SURGERY: 11/6/2024    NAME OF PROCEDURE: left total hip replacement.    SURGEON: Jethro Leigh MD.    ASSISTANT: BAN Stevens    IMPLANT SYSTEM: Amber Trident 2/Accolade 2    EBL:  150 cc.    DESCRIPTION OF PROCEDURE: The patient was taken to the Operating Room and given spinal anesthesia. Intravenous antibiotics were given. The patient was placed in the lateral decubitus position with an axillary roll in place. The limbs were carefully padded and supported. The hip was prepped and draped in the usual sterile fashion. A posterolateral skin incision was made. Sharp dissection was carried down to the fascia nahid. The fascia nahid and gluteus alvaro were incised The trochanteric bursa was excised. The abductor mechanism was retracted anteriorly. An incision was made between the piriformis and the gluteus minimus starting at the rim of the acetabulum, extending distally to the intertrochanteric ridge and then distally to the lesser trochanter creating a full-thickness posterior capsular/tendinous flap. This flap was retracted posteriorly, and the hip was found to be incarcerated.  A subcapital femoral neck cut was made and the remaining head was extracted with a corkscrew. The femoral neck was cut was freshened 1/2 inch above the lesser trochanter according to the preoperative plan. The femur was retracted anteriorly. The acetabulum was debrided from its rim to its base of connective tissue. It was reamed to 52 mm. The 52 mm trial had excellent fit and stability. The final shell was seated using the guide.  No supplemental dome screws were used. The anterior rim was checked for osteophytes and all that were threatening to cause impingement were removed. The socket was irrigated. The liner was snapped into place with the lip in a posterior, inferior location. It was carefully checked and found to be secure. The  proximal femur was then exposed in the wound. It was entered with a box osteotome and a starting awl. It was then broached to a size 4 x 127° in 15 degrees of anteversion. The calcar was planed. Trial reduction revealed excellent range of motion and stability with a +0 mm neck length. The final stem was seated. The neck was cleaned and dried. The 36 mm head was seated. The socket was irrigated. The hip was reduced. Range of motion and stability were excellent. The wound was irrigated. The capsule and tendinous flap were sutured back into place with #1 Vicryl, the fascia was closed with Stratafix, the deep tissues were injected with 1 gram of tranexamic acid. The subcutaneous tissue was closed with 2-0 Vicryl. The skin was closed with Monocryl and Prineo, and a occlusive plastic adhesive dressing was applied. There were no known complications.

## 2025-07-02 NOTE — INTERVAL H&P NOTE
The patient has been examined and the H&P has been reviewed:    I concur with the findings and no changes have occurred since H&P was written.  Borderline albumin noted.  Considering normal A1c, no contraindication to proceeding as planned    Surgery risks, benefits and alternative options discussed and understood by patient/family.            There are no hospital problems to display for this patient.

## 2025-07-02 NOTE — PLAN OF CARE
VIRTUAL NURSE: Pt arrived to unit. Permission received to turn camera to view patient. VIP model explained; Patient informed this VN will be working with bedside nurse and the rest of the care team.      Admission questions completed with assistance from spouse. Pt drowsy at this time, did state she plans to d/c tomorrow after PT eval.  Plan of care reviewed with patient.  Educated patient on VTE and fall risk. Safety precautions in place. Call light within reach, side rails up x2.     Patient instucted to ask staff for assistance. Patient verbalized complete understanding.  Will continue to be available and intervene as needed.    Labs, notes, orders, and careplan reviewed.     07/02/25 1800   Admission Complete   Admission Complete by VN Complete     Problem: Adult Inpatient Plan of Care  Goal: Plan of Care Review  Outcome: Progressing  Flowsheets (Taken 7/2/2025 9037)  Plan of Care Reviewed With:   patient   spouse  Goal: Patient-Specific Goal (Individualized)  Outcome: Progressing

## 2025-07-02 NOTE — ANESTHESIA PROCEDURE NOTES
Spinal    Diagnosis: Hip OA  Patient location during procedure: OR  Start time: 7/2/2025 12:35 PM  Timeout: 7/2/2025 12:31 PM  End time: 7/2/2025 12:45 PM    Staffing  Authorizing Provider: Martell Benjamin MD  Performing Provider: Vangie Boyd MD    Staffing  Performed by: Vangie Boyd MD  Authorized by: Martell Benjamin MD    Preanesthetic Checklist  Completed: patient identified, IV checked, site marked, risks and benefits discussed, surgical consent, monitors and equipment checked, pre-op evaluation and timeout performed  Spinal Block  Patient position: sitting  Prep: ChloraPrep  Patient monitoring: heart rate, continuous pulse ox, frequent blood pressure checks and cardiac monitor  Approach: midline  Location: L3-4  Injection technique: single shot  CSF Fluid: clear free-flowing CSF  Needle  Needle type: pencil-tip   Needle gauge: 25 G  Needle length: 3.5 in  Additional Documentation: incremental injection, negative aspiration for heme and no paresthesia on injection  Needle localization: anatomical landmarks  Assessment  Ease of block: moderate  Patient's tolerance of the procedure: comfortable throughout block and no complaints  Medications:    Medications: mepivacaine (CARBOCAINE) injection 15 mg/mL (1.5%) - Other   4 mL - 7/2/2025 12:45:00 PM

## 2025-07-02 NOTE — OPERATIVE NOTE ADDENDUM
Certification of Assistant at Surgery       Surgery Date: 7/2/2025     Participating Surgeons:  Surgeons and Role:     * Jethro Leigh MD - Primary    Procedures:  Procedure(s) (LRB):  ARTHROPLASTY, HIP (Left)    Assistant Surgeon's Certification of Necessity:  I understand that section 1842 (b) (6) (d) of the Social Security Act generally prohibits Medicare Part B reasonable charge payment for the services of assistants at surgery in teaching hospitals when qualified residents are available to furnish such services. I certify that the services for which payment is claimed were medically necessary, and that no qualified resident was available to perform the services. I further understand that these services are subject to post-payment review by the Medicare carrier.      Yolanda Stevens PA-C    07/02/2025  3:13 PM

## 2025-07-02 NOTE — PT/OT/SLP PROGRESS
Physical Therapy      Patient Name:  Parris Guevara   MRN:  [id number]    Patient not seen today secondary to Pain. Per nursing pt in pain but okay'd to attempt bedside evaluation. Pt currently in recovery; reporting 8/10 pain to L hip and complaints of nausea with vomiting during PT/OT attempt; pt repositioned and given emesis bag. Pt's BLE sensation intact but unable to move LLE in bed. (Pt reports living with  in 1 story home with no steps to enter). Recommending overnight stay for continued progress with PT/OT tomorrow AM- when medically appropriate/pain controlled; pt understanding. Will follow-up tomorrow AM.     7/2/25- 16:08

## 2025-07-02 NOTE — PT/OT/SLP PROGRESS
Occupational Therapy      Patient Name:  Parris Guevara   MRN:  [id number]    Patient not seen today secondary to Pain; Patient okay'd for OT PT eval; patient currently in recovery; reporting 8/10 pain to L hip and complaints of nausea with vomiting during evaluation attempt. Patient repositioned and given emesis bag. Endorses intact BLE sensation intact but unable to move LLE in bed.     Initiated subjective information: Patient reports living with  in 1 story home with no steps to enter.     Recommending overnight stay for continued progress with OT / PT tomorrow AM- when medically appropriate/pain controlled; patient verbalizes understanding. Will follow-up tomorrow AM.      Will follow-up as able.    7/2/2025

## 2025-07-03 PROCEDURE — 97530 THERAPEUTIC ACTIVITIES: CPT

## 2025-07-03 PROCEDURE — 97161 PT EVAL LOW COMPLEX 20 MIN: CPT

## 2025-07-03 PROCEDURE — 99900035 HC TECH TIME PER 15 MIN (STAT)

## 2025-07-03 PROCEDURE — 97530 THERAPEUTIC ACTIVITIES: CPT | Mod: CQ

## 2025-07-03 PROCEDURE — 94761 N-INVAS EAR/PLS OXIMETRY MLT: CPT

## 2025-07-03 PROCEDURE — 63600175 PHARM REV CODE 636 W HCPCS: Performed by: ORTHOPAEDIC SURGERY

## 2025-07-03 PROCEDURE — 97535 SELF CARE MNGMENT TRAINING: CPT

## 2025-07-03 PROCEDURE — 97116 GAIT TRAINING THERAPY: CPT

## 2025-07-03 PROCEDURE — 97165 OT EVAL LOW COMPLEX 30 MIN: CPT

## 2025-07-03 PROCEDURE — 27000221 HC OXYGEN, UP TO 24 HOURS

## 2025-07-03 PROCEDURE — 25000003 PHARM REV CODE 250: Performed by: ORTHOPAEDIC SURGERY

## 2025-07-03 RX ORDER — KETOROLAC TROMETHAMINE 30 MG/ML
15 INJECTION, SOLUTION INTRAMUSCULAR; INTRAVENOUS EVERY 6 HOURS
Status: DISCONTINUED | OUTPATIENT
Start: 2025-07-03 | End: 2025-07-05 | Stop reason: HOSPADM

## 2025-07-03 RX ADMIN — ACETAMINOPHEN 1000 MG: 500 TABLET ORAL at 08:07

## 2025-07-03 RX ADMIN — SENNOSIDES AND DOCUSATE SODIUM 1 TABLET: 50; 8.6 TABLET ORAL at 09:07

## 2025-07-03 RX ADMIN — PANTOPRAZOLE SODIUM 40 MG: 40 TABLET, DELAYED RELEASE ORAL at 08:07

## 2025-07-03 RX ADMIN — MELOXICAM 15 MG: 7.5 TABLET ORAL at 08:07

## 2025-07-03 RX ADMIN — CARVEDILOL 6.25 MG: 6.25 TABLET, FILM COATED ORAL at 09:07

## 2025-07-03 RX ADMIN — POLYETHYLENE GLYCOL 3350 17 G: 17 POWDER, FOR SOLUTION ORAL at 08:07

## 2025-07-03 RX ADMIN — SODIUM CHLORIDE: 9 INJECTION, SOLUTION INTRAVENOUS at 05:07

## 2025-07-03 RX ADMIN — GABAPENTIN 300 MG: 300 CAPSULE ORAL at 08:07

## 2025-07-03 RX ADMIN — CALCIUM CARBONATE (ANTACID) CHEW TAB 500 MG 500 MG: 500 CHEW TAB at 08:07

## 2025-07-03 RX ADMIN — KETOROLAC TROMETHAMINE 15 MG: 30 INJECTION, SOLUTION INTRAMUSCULAR; INTRAVENOUS at 02:07

## 2025-07-03 RX ADMIN — ACETAMINOPHEN 1000 MG: 500 TABLET ORAL at 02:07

## 2025-07-03 RX ADMIN — GABAPENTIN 300 MG: 300 CAPSULE ORAL at 02:07

## 2025-07-03 RX ADMIN — VALSARTAN 320 MG: 160 TABLET, FILM COATED ORAL at 08:07

## 2025-07-03 RX ADMIN — HYDROMORPHONE HYDROCHLORIDE 0.5 MG: 1 INJECTION, SOLUTION INTRAMUSCULAR; INTRAVENOUS; SUBCUTANEOUS at 06:07

## 2025-07-03 RX ADMIN — OXYCODONE HYDROCHLORIDE 10 MG: 10 TABLET ORAL at 08:07

## 2025-07-03 RX ADMIN — ACETAMINOPHEN 1000 MG: 500 TABLET ORAL at 09:07

## 2025-07-03 RX ADMIN — LEVOTHYROXINE SODIUM 125 MCG: 0.12 TABLET ORAL at 05:07

## 2025-07-03 RX ADMIN — SENNOSIDES AND DOCUSATE SODIUM 1 TABLET: 50; 8.6 TABLET ORAL at 08:07

## 2025-07-03 RX ADMIN — OXYCODONE HYDROCHLORIDE 10 MG: 10 TABLET ORAL at 12:07

## 2025-07-03 RX ADMIN — CEFAZOLIN 2 G: 2 INJECTION, POWDER, FOR SOLUTION INTRAMUSCULAR; INTRAVENOUS at 05:07

## 2025-07-03 RX ADMIN — CARVEDILOL 6.25 MG: 6.25 TABLET, FILM COATED ORAL at 08:07

## 2025-07-03 RX ADMIN — KETOROLAC TROMETHAMINE 15 MG: 30 INJECTION, SOLUTION INTRAMUSCULAR; INTRAVENOUS at 11:07

## 2025-07-03 RX ADMIN — ASPIRIN 81 MG CHEWABLE TABLET 81 MG: 81 TABLET CHEWABLE at 08:07

## 2025-07-03 RX ADMIN — GABAPENTIN 300 MG: 300 CAPSULE ORAL at 09:07

## 2025-07-03 NOTE — PLAN OF CARE
.Occupational therapy evaluation completed, co-evaluation with physical therapy 2/2 first attempt out of bed s/p L ADDY post op day 1. At baseline patient is usually functioning at modified independence level, with no assistive device, with chronic UE ROM deficits, and has had no falls in past 3 months. Patient on evaluation able to perform functional mobility with progression to min/CGA with rolling walker but limited by pain increasing up to an 8/10 in L hip. Increased assist needed for LB ADLs at this time.  Initiated education and instruction on hip precautions, proper icing/elevation/positioning regimen, adaptive ADL techniques, and safe functional mobility with rolling walker. Will continue to follow. Patient is pending an afternoon PT session; anticipate discharge home with support of spouse and with low intensity therapy to follow.      Problem: Occupational Therapy  Goal: Occupational Therapy Goal  Description: Goals to be met by: 7/24/2025     Patient will increase functional independence with ADLs by performing:    LE Dressing with Stand-by Assistance with use of hip kit and implementation of hip precautions  Toileting from toilet with Stand-by Assistance for hygiene and clothing management.   Toilet transfer to toilet with Stand-by Assistance and use of rolling walker.  Functional mobility to/from bathroom with SBA and rolling walker.  100% reciprocation of 3/3 hip precautions, DME recommendations, and ADL/task modifications post L ADDY to support safe d/c at highest level of function.           Outcome: Progressing

## 2025-07-03 NOTE — PT/OT/SLP PROGRESS
Physical Therapy Treatment    Patient Name:  Parris Guevara   MRN:  [id number]    Recommendations:     Discharge Recommendations: Low Intensity Therapy (HH PT with family assistance)  Discharge Equipment Recommendations: shower chair  Barriers to discharge: decreased mobility,strength ,endurance ane increased pain.    Assessment:     Parris Guevara is a 59 y.o. female admitted with a medical diagnosis of History of left hip replacement.  She presents with the following impairments/functional limitations: weakness, impaired endurance, impaired functional mobility, gait instability, impaired balance, decreased lower extremity function, pain, decreased ROM, impaired coordination, impaired skin, impaired joint extensibility, orthopedic precautions,pt with good participation and remains limited by pain,pt not ready for discharge home today and will be seen tomorrow,spoke with nsg and evaluating PT,sent a secure chat to Dr Leigh.    Rehab Prognosis: Good; patient would benefit from acute skilled PT services to address these deficits and reach maximum level of function.    Recent Surgery: Procedure(s) (LRB):  ARTHROPLASTY, HIP (Left) 1 Day Post-Op    Plan:     During this hospitalization, patient to be seen BID to address the identified rehab impairments via gait training, therapeutic activities, therapeutic exercises, neuromuscular re-education and progress toward the following goals:    Plan of Care Expires:  08/03/25    Subjective     Chief Complaint: pain  Patient/Family Comments/goals: pt agreeable to rx.  Pain/Comfort:  Pain Rating 1: 6/10  Location - Side 1: Left  Location 1: hip  Pain Addressed 1: Pre-medicate for activity, Reposition, Distraction, Cessation of Activity  Pain Rating Post-Intervention 1: 10/10      Objective:     Communicated with nsg prior to session.  Patient found up in chair with   upon PT entry to room.     General Precautions: Standard, fall  Orthopedic Precautions: LLE weight  bearing as tolerated, LLE posterior precautions  Braces:  (hip abduction pillow)  Respiratory Status: Room air     Functional Mobility:  Transfers:     Sit to Stand:  moderate assistance with rolling walker      AM-PAC 6 CLICK MOBILITY  Turning over in bed (including adjusting bedclothes, sheets and blankets)?: 2  Sitting down on and standing up from a chair with arms (e.g., wheelchair, bedside commode, etc.): 2  Moving from lying on back to sitting on the side of the bed?: 2  Moving to and from a bed to a chair (including a wheelchair)?: 2  Need to walk in hospital room?: 1 (pain)  Climbing 3-5 steps with a railing?: 1  Basic Mobility Total Score: 10       Treatment & Education: pt's L le buckling with standing secondary to pain,pt was premedicated,pt took pre gait steps with Min/Mod A and unsafe to go further distances.      Patient left up in chair with all lines intact, call button in reach, nsg notified, and spouse present..    GOALS: see general POC  Multidisciplinary Problems       Physical Therapy Goals          Problem: Physical Therapy    Goal Priority Disciplines Outcome Interventions   Physical Therapy Goal     PT, PT/OT Progressing    Description: Goals to be met by: 8/3/25     Patient will increase functional independence with mobility by performin. Supine to sit with Stand-by Assistance  2. Sit to supine with Stand-by Assistance  3. Sit to stand transfer with Stand-by Assistance with use of RW.   4. Bed to chair transfer with Stand-by Assistance using Rolling Walker  5. Gait  x 100 feet with Stand-by Assistance using Rolling Walker.                          DME Justifications: see PT recs      Time Tracking:     PT Received On: 25  PT Start Time: 1334     PT Stop Time: 1357  PT Total Time (min): 23 min     Billable Minutes: Therapeutic Activity 23    Treatment Type: Treatment  PT/PTA: PTA     Number of PTA visits since last PT visit: 2025

## 2025-07-03 NOTE — NURSING
Pt. To remain another night inpatient; see therapy note. Dr. Leigh stated to discontinue discharge order at this time. Plan of care continued.

## 2025-07-03 NOTE — PLAN OF CARE
Future Appointments   Date Time Provider Department Center   7/17/2025  9:00 AM [facility] ORTHO CLINIC LIMIT 350LBS [facility] ORTHXR [facility]   7/17/2025  9:15 AM Jethro Leigh MD [facility] ORTHO [facility]   10/2/2025 10:20 AM LAB, APPOINTMENT [facility]ED [facility] IM [facility]   10/2/2025 10:45 AM [facility] OIC-MRI3 [facility] MRI IC Imaging Ctr   10/9/2025  9:00 AM [facility] [address]   10/30/2025  1:30 PM Reyna Duval MD [facility] IM [place]   12/9/2025  9:00 AM Claudia Cardozo MD [facility] URO [facility] Cli         07/03/25 0851   Discharge Planning   Assessment Type Discharge Planning Brief Assessment   Resource/Environmental Concerns none   Support Systems Family members   Equipment Currently Used at Home walker, rolling;bedside commode   Current Living Arrangements home   Patient/Family Anticipates Transition to home   Patient/Family Anticipated Services at Transition none   DME Needed Upon Discharge  none   Discharge Plan A Home with family   Physical Activity   On average, how many days per week do you engage in moderate to strenuous exercise (like a brisk walk)? Pt Unable   On average, how many minutes do you engage in exercise at this level? Pt Unable   Financial Resource Strain   How hard is it for you to pay for the very basics like food, housing, medical care, and heating? Pt Unable   Housing Stability   In the last 12 months, was there a time when you were not able to pay the mortgage or rent on time? Pt Unable   At any time in the past 12 months, were you homeless or living in a shelter (including now)? Pt Unable   Transportation Needs   In the past 12 months, has lack of transportation kept you from medical appointments or from getting medications? Pt Unable   In the past 12 months, has lack of transportation kept you from meetings, work, or from getting things needed for daily living? Pt Unable   Food Insecurity   Within the past 12 months, you worried that your food would run out  before you got the money to buy more. Pt Unable   Within the past 12 months, the food you bought just didn't last and you didn't have money to get more. Pt Unable   Stress   Do you feel stress - tense, restless, nervous, or anxious, or unable to sleep at night because your mind is troubled all the time - these days? Pt Unable   Social Isolation   How often do you feel lonely or isolated from those around you?  Patient unable to answer   Alcohol Use   Q1: How often do you have a drink containing alcohol? Pt Unable   Q2: How many drinks containing alcohol do you have on a typical day when you are drinking? Pt Unable   Q3: How often do you have six or more drinks on one occasion? Pt Unable   Utilities   In the past 12 months has the electric, gas, oil, or water company threatened to shut off services in your home? Pt Unable   Health Literacy   How often do you need to have someone help you when you read instructions, pamphlets, or other written material from your doctor or pharmacy? Patient unable to respond

## 2025-07-03 NOTE — PT/OT/SLP EVAL
Physical Therapy Evaluation and Treatment    Patient Name:  Parris Guevara   MRN:  [id number]    Recommendations:     Discharge Recommendations: Low Intensity Therapy (HH PT with family assistance)   Discharge Equipment Recommendations: shower chair   Barriers to discharge: limited functional independence/endurance due to pain    Assessment:     Parris Guevara is a 59 y.o. female admitted with a medical diagnosis of History of left hip replacement.  She presents with the following impairments/functional limitations: weakness, impaired endurance, impaired self care skills, impaired functional mobility, gait instability, impaired balance, pain, decreased safety awareness, decreased lower extremity function, decreased upper extremity function, decreased ROM, orthopedic precautions.    PT/OT co-evaluation completed due to pt's first time OOB post surgery. Pt's PLOF: independent with no AD. Pt at this time MOD A with bed mobility, MIN A with transfers to standing and CGA with ambulation with use of RW. PT recommending low intensity therapy- HH PT with spouse assistance and use of RW at all times. Therapy will continue to progress pt as able.     Rehab Prognosis: Good; patient would benefit from acute skilled PT services to address these deficits and reach maximum level of function.    Recent Surgery: Procedure(s) (LRB):  ARTHROPLASTY, HIP (Left) 1 Day Post-Op    Plan:     During this hospitalization, patient to be seen BID to address the identified rehab impairments via gait training, therapeutic activities, therapeutic exercises, neuromuscular re-education and progress toward the following goals:    Plan of Care Expires:  08/03/25    Subjective     Chief Complaint: pain  Patient/Family Comments/goals: to return home  Pain/Comfort:  Pain Rating 1:  (4/10 at rest; increased to 8/10 with mobility; improving some at rest post mobility- not rated. L hip)  Pain Addressed 1: Reposition, Cessation of Activity,  Pre-medicate for activity, Nurse notified  Pain Rating Post-Intervention 1:  (not rated)    Patients cultural, spiritual, Mormonism conflicts given the current situation: no    Living Environment:  Lives with  in 1 story home with no steps to enter. WIS. BSC over toilet.   Prior to admission, patients level of function was Independent with no AD.  Equipment used at home: walker, rolling, cane, straight, bedside commode.  DME owned (not currently used): none.  Upon discharge, patient will have assistance from family.    Objective:     Communicated with Nurse prior to session.  Patient found HOB elevated with PureWick, bed alarm  upon PT entry to room.    General Precautions: Standard, fall  Orthopedic Precautions:LLE weight bearing as tolerated, LLE posterior precautions   Braces:  (hip abduction pillow)  Respiratory Status: Room air; 97%    Exams:  Cognitive Exam:  Patient is oriented to Person, Place, Time, and Situation  Sensation:    -       Intact  light/touch to BLE  RLE ROM: WFL  RLE Strength: WFL  LLE ROM: limited due to weakness/pain post surgery  LLE Strength: 1/5 hip flexion, 3-/5 knee extension and 3/5 ankle PF/DF    Functional Mobility:  Bed Mobility:     Supine to Sit: moderate assistance  Transfers:     Sit to Stand:  MOD A progress to MIN A with rolling walker  Bed to Chair: contact guard assistance with  rolling walker  using  Step Transfer  Gait: ~2ft with use of RW and MIN A (forward/retro steps); seated rest break; additional ~20ft with use of RW and MIN A progress to CGA; slow gait pattern and limited BLE step length/height due to LLE pain      AM-PAC 6 CLICK MOBILITY  Total Score:15       Treatment & Education:  Pt educated on role of PT, LLE posterior hip precautions, safe use of RW at all times with mobility, and proper BLE stepping pattern with ambulation as well as use/positioning/duration of ice.   Pt/spouse given and educated on use of gait belt in home setting as needed for  safety.   PT recommending additional PM session for further progress of ambulation distance/independence.   Pt educated on use of call button for mobility assistance in room; pt understanding.    Patient left up in chair with all lines intact, call button in reach, chair alarm on, Nurse notified, and spouse present.    GOALS:   Multidisciplinary Problems       Physical Therapy Goals          Problem: Physical Therapy    Goal Priority Disciplines Outcome Interventions   Physical Therapy Goal     PT, PT/OT Progressing    Description: Goals to be met by: 8/3/25     Patient will increase functional independence with mobility by performin. Supine to sit with Stand-by Assistance  2. Sit to supine with Stand-by Assistance  3. Sit to stand transfer with Stand-by Assistance with use of RW.   4. Bed to chair transfer with Stand-by Assistance using Rolling Walker  5. Gait  x 100 feet with Stand-by Assistance using Rolling Walker.                          History:     Past Medical History:   Diagnosis Date    Arthritis     Diabetes mellitus, type 2     Essential (primary) hypertension     Fatty liver     Hyperlipidemia     Hypertension     Hyperthyroidism     Hypothyroidism     Psoriasis     Spleen enlarged        Past Surgical History:   Procedure Laterality Date    ARTHROPLASTY OF SHOULDER Right         COLONOSCOPY N/A 2022    Procedure: COLONOSCOPY;  Surgeon: Obed Duran MD;  Location: [address]);  Service: Endoscopy;  Laterality: N/A;  per referral entered by Dr. Reyna Duval for screening colonosocpy to be scheduled with EGD/ EGD and colon combined-ERW  Labs last on 10/19/22 and 22-ERW  prep ins. on portal - ERW    ESOPHAGOGASTRODUODENOSCOPY N/A 2022    Procedure: ESOPHAGOGASTRODUODENOSCOPY (EGD);  Surgeon: Obed Duran MD;  Location: [facility] ([address] [place] [address]);  Service: Endoscopy;  Laterality: N/A;    ESOPHAGOGASTRODUODENOSCOPY N/A 10/17/2023    Procedure: EGD  (ESOPHAGOGASTRODUODENOSCOPY);  Surgeon: Birgit Peralta MD;  Location: [facility] ENDO (4TH FLR);  Service: Endoscopy;  Laterality: N/A;  Ins. to portal. Doabetic takes Ozempic. Cirrhosis: labs ordered. Tbou  referral: Babak BECKHAM  10/10-precall complete-pt verbalized understandingof holding Ozempic-MS    ESOPHAGOGASTRODUODENOSCOPY N/A 10/16/2024    Procedure: EGD (ESOPHAGOGASTRODUODENOSCOPY);  Surgeon: Jose Carlos Villatoro MD;  Location: [facility] (4TH [facility]);  Service: Gastroenterology;  Laterality: N/A;  Ref by CHAPINCITO Hilliard, Labs am procedure, Ozempic, portal - PC  10/9 precall complete, labs at 0930, pt states she's been off Ozempic in 2 months, portal-ml    HIP ARTHROPLASTY Left 7/2/2025    Procedure: ARTHROPLASTY, HIP;  Surgeon: Jethro Leigh MD;  Location: [facility] OR;  Service: Orthopedics;  Laterality: Left;  A[place]    ROBOT-ASSISTED LAPAROSCOPIC LYSIS OF ADHESIONS USING DA REED XI N/A 09/12/2022    Procedure: XI ROBOTIC LYSIS, ADHESIONS;  Surgeon: Silvestre Umaña MD;  Location: [facility];  Service: Oncology;  Laterality: N/A;    THYROIDECTOMY, PARTIAL      XI ROBOTIC HYSTERECTOMY, WITH SALPINGO-OOPHORECTOMY Bilateral 09/12/2022    Procedure: XI ROBOTIC HYSTERECTOMY,WITH SALPINGO-OOPHORECTOMY;  Surgeon: Silvestre Umaña MD;  Location: [facility];  Service: Oncology;  Laterality: Bilateral;       Time Tracking:     PT Received On: 07/03/25  PT Start Time: 0927     PT Stop Time: 1003  PT Total Time (min): 36 min With OT    Billable Minutes: Evaluation 10 and Gait Training 15      07/03/2025

## 2025-07-03 NOTE — PLAN OF CARE
Problem: Adult Inpatient Plan of Care  Goal: Absence of Hospital-Acquired Illness or Injury  Outcome: Progressing  Goal: Optimal Comfort and Wellbeing  Outcome: Progressing     Problem: Diabetes Comorbidity  Goal: Blood Glucose Level Within Targeted Range  Outcome: Progressing     Problem: Wound  Goal: Absence of Infection Signs and Symptoms  Outcome: Progressing  Goal: Improved Oral Intake  Outcome: Progressing  Goal: Optimal Pain Control and Function  Outcome: Progressing  Goal: Skin Health and Integrity  Outcome: Progressing

## 2025-07-03 NOTE — PLAN OF CARE
Problem: Physical Therapy  Goal: Physical Therapy Goal  Description: Goals to be met by: 8/3/25     Patient will increase functional independence with mobility by performin. Supine to sit with Stand-by Assistance  2. Sit to supine with Stand-by Assistance  3. Sit to stand transfer with Stand-by Assistance with use of RW.   4. Bed to chair transfer with Stand-by Assistance using Rolling Walker  5. Gait  x 100 feet with Stand-by Assistance using Rolling Walker.     Outcome: Progressing     PT/OT co-evaluation completed due to pt's first time OOB post surgery. Pt's PLOF: independent with no AD. Pt at this time MOD A with bed mobility, MIN A with transfers to standing and CGA with ambulation with use of RW. PT recommending low intensity therapy-  PT with spouse assistance and use of RW at all times. Therapy will continue to progress pt as able.

## 2025-07-03 NOTE — PROGRESS NOTES
"[facility] Surg  Orthopedics  Progress Note    Patient Name: Parris Guevara  MRN: [id number]  Admission Date: 7/2/2025  Hospital Length of Stay: 0 days  Attending Provider: Jethro Leigh MD  Primary Care Provider: Reyna Duval MD  Follow-up For: Procedure(s) (LRB):  ARTHROPLASTY, HIP (Left)    Post-Operative Day: 1 Day Post-Op  Subjective:     Principal Problem:History of left hip replacement    Principal Orthopedic Problem:  Same    Interval History:  The patient complains of incisional pain.  Improvement noted with recent analgesics.    Review of patient's allergies indicates:  No Known Allergies    Current Facility-Administered Medications   Medication    acetaminophen tablet 1,000 mg    aspirin chewable tablet 81 mg    bisacodyL suppository 10 mg    calcium carbonate 200 mg calcium (500 mg) chewable tablet 500 mg    carvediloL tablet 6.25 mg    gabapentin capsule 300 mg    lactulose 20 gram/30 mL solution Soln 10 g    levothyroxine tablet 125 mcg    meloxicam tablet 15 mg    ondansetron disintegrating tablet 4 mg    ondansetron injection 4 mg    oxyCODONE immediate release tablet 5 mg    oxyCODONE immediate release tablet Tab 10 mg    pantoprazole EC tablet 40 mg    polyethylene glycol packet 17 g    senna-docusate 8.6-50 mg per tablet 1 tablet    sodium chloride 0.9% flush 5 mL    valsartan tablet 320 mg    zolpidem tablet 5 mg     Objective:     Vital Signs (Most Recent):  Temp: 97.9 °F (36.6 °C) (07/03/25 0725)  Pulse: 81 (07/03/25 0725)  Resp: 18 (07/03/25 0725)  BP: 111/70 (07/03/25 0725)  SpO2: 98 % (07/03/25 0725) Vital Signs (24h Range):  Temp:  [97.4 °F (36.3 °C)-98.2 °F (36.8 °C)] 97.9 °F (36.6 °C)  Pulse:  [66-81] 81  Resp:  [12-18] 18  SpO2:  [94 %-100 %] 98 %  BP: (111-172)/(62-96) 111/70     Weight: 93.9 kg (207 lb)  Height: 5' 7" (170.2 cm)  Body mass index is 32.42 kg/m².      Intake/Output Summary (Last 24 hours) at 7/3/2025 0814  Last data filed at 7/3/2025 0522  Gross per 24 " hour   Intake 1500 ml   Output 500 ml   Net 1000 ml       Ortho/SPM Exam    Appears alert and comfortable  Dressing dry and intact  Left lower extremity neurovascular intact  Postop radiographs are satisfactory    Significant Labs: None    Significant Imaging: I have reviewed all pertinent imaging results/findings.    Assessment/Plan:     Active Diagnoses:    Diagnosis Date Noted POA    PRINCIPAL PROBLEM:  History of left hip replacement [Z96.642] 07/02/2025 Not Applicable      Problems Resolved During this Admission:   Normal postop day 1 after hip replacement  Ambulate with therapy  Transition to oral analgesics  Might consider Toradol, if necessary    May be discharged home when walking safely      Jethro Leigh MD  Orthopedics  [facility] Surg

## 2025-07-03 NOTE — PLAN OF CARE
D/c orders noted.     SW met with pt to discuss d/c plans. Pt stated she feels good and is ready to discharge home. Pt is agreeable to discharge home with  services. Pt's family will provide transportation home following discharge.    Set up completed for pt to discharge home with  services with [facility].     Pt is cleared to go from CM standpoint.     Future Appointments   Date Time Provider Department Center   7/17/2025  9:00 AM [facility] ORTHO CLINIC LIMIT 350LBS [facility] ORTHXR [place] [facility]   7/17/2025  9:15 AM Jethro Leigh MD [facility] ORTHO [facility]   10/2/2025 10:20 AM LAB, APPOINTMENT [facility] INTMED [facility] LAB IM [facility]   10/2/2025 10:45 AM [facility] OIC-MRI3 [facility] MRI IC Imaging Ctr   10/9/2025  9:00 AM Adeola Hilliard NP [facility] HEPAT [address]   10/30/2025  1:30 PM Reyna Duval MD [facility] IM [place]   12/9/2025  9:00 AM Claudia Cardozo MD [facility] Cli         07/03/25 0856   Final Note   Assessment Type Final Discharge Note   Anticipated Discharge Disposition Home-Health   What phone number can be called within the next 1-3 days to see how you are doing after discharge? [phone]   Post-Acute Status   Post-Acute Authorization Home Health   Discharge Delays None known at this time

## 2025-07-03 NOTE — PT/OT/SLP EVAL
Occupational Therapy   Evaluation and treatment    Name: Parris Guevara  MRN: [id number]  Admitting Diagnosis: History of left hip replacement  Recent Surgery: Procedure(s) (LRB):  ARTHROPLASTY, HIP (Left) 1 Day Post-Op    Recommendations:     Discharge Recommendations: Low Intensity Therapy (with family support)  Discharge Equipment Recommendations:  shower chair  Barriers to discharge:  Other (Comment) (pain)    Assessment:     Parris Guevara is a 59 y.o. female with a medical diagnosis of History of left hip replacement.  She presents with .The primary encounter diagnosis was History of left hip replacement. A diagnosis of Primary osteoarthritis of left hip was also pertinent to this visit.  . Performance deficits affecting function: weakness, impaired endurance, impaired self care skills, impaired functional mobility, decreased lower extremity function, decreased upper extremity function, gait instability, impaired balance, pain, decreased safety awareness, decreased ROM.        .Occupational therapy evaluation completed, co-evaluation with physical therapy 2/2 first attempt out of bed s/p L ADDY post op day 1. At baseline patient is usually functioning at modified independence level, with no assistive device, with chronic UE ROM deficits, and has had no falls in past 3 months. Patient on evaluation able to perform functional mobility with progression to min/CGA with rolling walker but limited by pain increasing up to an 8/10 in L hip. Increased assist needed for LB ADLs at this time.  Initiated education and instruction on hip precautions, proper icing/elevation/positioning regimen, adaptive ADL techniques, and safe functional mobility with rolling walker. Will continue to follow. Patient is pending an afternoon PT session; anticipate discharge home with support of spouse and with low intensity therapy to follow.      Rehab Prognosis: Good; patient would benefit from acute skilled OT services to  address these deficits and reach maximum level of function.       Plan:     Patient to be seen 5 x/week to address the above listed problems via self-care/home management, therapeutic activities, therapeutic exercises  Plan of Care Expires: 07/24/25  Plan of Care Reviewed with: patient, spouse    Subjective     Chief Complaint: pain, increasing with movement - L hip  Patient/Family Comments/goals: agreeable to try to sit up in the chair    Occupational Profile:  Living Environment: resides with spouse in a single story home, no steps to enter, walk in shower, bedside commode over toilet  Previous level of function:  At baseline patient is usually functioning at modified independence level, with no assistive device, with chronic UE ROM deficits, and has had no falls in past 3 months.   Equipment Used at Home: bedside commode, cane, straight, walker, rolling  Assistance upon Discharge: spouse    Pain/Comfort:  Pain Rating 1:  (4/10 at rest L hip)  Pain Addressed 1: Reposition, Cessation of Activity, Nurse notified  Pain Rating Post-Intervention 1: other (see comments) (increases to 8/10 in LLE when ambulating, decreases to a 6/10 at end of session)      Objective:     Communicated with: nursing prior to session.  Patient found HOB elevated with bed alarm, PureWick upon OT entry to room.    General Precautions: Standard, fall  Orthopedic Precautions: LLE weight bearing as tolerated, LLE posterior precautions  Braces:  (hip abd pillow)  Respiratory Status: on nasal cannula initially; placed on room air - SpO2 > 97%    Occupational Performance:    Bed Mobility:    Patient completed Supine to Sit with moderate assist, increased time    Functional Mobility/Transfers:  Patient completed Sit <> Stand Transfer with moderate assist, progression to minimal assist on subsequent efforts  with  rolling walker, min verbal cues for technique   Patient completed Bed <> Chair Transfer using Step Transfer technique with minimal assist  with rolling walker  Functional Mobility: in room functional mobility with rolling walker, minimal to CGA, short steps, frequent rest breaks, cues for step sequencing and increased time    Activities of Daily Living:  Upper Body Dressing: set up  Lower Body Dressing: moderate assistance ; educated on hip kit reference from joint camp packet; assist for LLE first    Cognitive/Visual Perceptual:  Cognitive/Psychosocial Skills:     -       Oriented to: Person, Place, Time, and Situation   -       Follows Commands/attention:Follows multistep  commands    Physical Exam:  Skin integrity: L hip surgical incision/dressed   Sensation:    -       Intact  light/touch BUE  Upper Extremity Range of Motion:     -       Right Upper Extremity: deficits/ chronic; hx of R TSA ~ 7 months ago per report; active 0 - ~85; accepts antigravity  -       Left Upper Extremity: deficits/ chronic; 0 - ~ 100 scaption; accepts antigravity    AMPAC 6 Click ADL:  AMPAC Total Score: 17    Treatment & Education:  Patient educated on role of OT/ POC development. Co-evaluation with physical therapy in consideration of first attempt out of bed s/p L ADDY .   Patient educated on hip precautions with 100% reciprocation on 2nd trial.   Patient also educated on positioning such as  use of ice pack with barrier for pain management.   ADL / functional mobility training as above.   Functional mobility performed as above with instructed mobility sequence.   Instructed on LB dressing techniques via adaptive techniques.  Educated on home safety with showers and/or shower transfer; discussed recommendation for shower chair. Educated on necessity to use rolling walker at all times. Educated on plan / recommendation to sit up in chair,  with ice pack. Patient pending a second PT session in afternoon.      Patient left up in chair with all lines intact, call button in reach, chair alarm on, and nursing notified    GOALS:   Multidisciplinary Problems       Occupational  Therapy Goals          Problem: Occupational Therapy    Goal Priority Disciplines Outcome Interventions   Occupational Therapy Goal     OT, PT/OT Progressing    Description: Goals to be met by: 7/24/2025     Patient will increase functional independence with ADLs by performing:    LE Dressing with Stand-by Assistance with use of hip kit and implementation of hip precautions  Toileting from toilet with Stand-by Assistance for hygiene and clothing management.   Toilet transfer to toilet with Stand-by Assistance and use of rolling walker.  Functional mobility to/from bathroom with SBA and rolling walker.  100% reciprocation of 3/3 hip precautions, DME recommendations, and ADL/task modifications post L ADDY to support safe d/c at highest level of function.                                History:     Past Medical History:   Diagnosis Date    Arthritis     Diabetes mellitus, type 2     Essential (primary) hypertension     Fatty liver     Hyperlipidemia     Hypertension     Hyperthyroidism     Hypothyroidism     Psoriasis     Spleen enlarged          Past Surgical History:   Procedure Laterality Date    ARTHROPLASTY OF SHOULDER Right     2024    COLONOSCOPY N/A 11/16/2022    Procedure: COLONOSCOPY;  Surgeon: Obed Duran MD;  Location: [facility] (2ND FLR);  Service: Endoscopy;  Laterality: N/A;  per referral entered by Dr. Reyna Duval for screening colonosocpy to be scheduled with EGD/ EGD and colon combined-ERW  Labs last on 10/19/22 and 11/1/22-ERW  prep ins. on portal - ERW    ESOPHAGOGASTRODUODENOSCOPY N/A 11/16/2022    Procedure: ESOPHAGOGASTRODUODENOSCOPY (EGD);  Surgeon: Obed Duran MD;  Location: [facility] (2ND FLR);  Service: Endoscopy;  Laterality: N/A;    ESOPHAGOGASTRODUODENOSCOPY N/A 10/17/2023    Procedure: EGD (ESOPHAGOGASTRODUODENOSCOPY);  Surgeon: Birgit Peralta MD;  Location: [facility] (4TH FLR);  Service: Endoscopy;  Laterality: N/A;  Ins. to portal. Doabetic takes Ozempic. Cirrhosis: labs  ordered. Tbou  referral: Babak BECKHAM  10/10-precall complete-pt verbalized understandingof holding Ozempic-MS    ESOPHAGOGASTRODUODENOSCOPY N/A 10/16/2024    Procedure: EGD (ESOPHAGOGASTRODUODENOSCOPY);  Surgeon: Jose Carlos Villatoro MD;  Location: [address]);  Service: Gastroenterology;  Laterality: N/A;  Ref by NP FLAKITO Hilliard, Labs am procedure, Ozempic, portal - PC  10/9 precall complete, labs at 0930, pt states she's been off Ozempic in 2 months, portal-ml    HIP ARTHROPLASTY Left 7/2/2025    Procedure: ARTHROPLASTY, HIP;  Surgeon: Jethro Leigh MD;  Location: [facility];  Service: Orthopedics;  Laterality: Left;  Amber    ROBOT-ASSISTED LAPAROSCOPIC LYSIS OF ADHESIONS USING DA REED XI N/A 09/12/2022    Procedure: XI ROBOTIC LYSIS, ADHESIONS;  Surgeon: Silvestre Umaña MD;  Location: [facility];  Service: Oncology;  Laterality: N/A;    THYROIDECTOMY, PARTIAL      XI ROBOTIC HYSTERECTOMY, WITH SALPINGO-OOPHORECTOMY Bilateral 09/12/2022    Procedure: XI ROBOTIC HYSTERECTOMY,WITH SALPINGO-OOPHORECTOMY;  Surgeon: Silvestre Umaña MD;  Location: [facility];  Service: Oncology;  Laterality: Bilateral;       Time Tracking:     OT Date of Treatment: 07/03/25  OT Start Time: 0928  OT Stop Time: 1002  OT Total Time (min): 34 min total time    Billable Minutes:Evaluation 8 min  Self Care/Home Management 15 min  Therapeutic Activity 8 min    7/3/2025

## 2025-07-03 NOTE — PLAN OF CARE
Problem: Adult Inpatient Plan of Care  Goal: Plan of Care Review  Outcome: Progressing     Problem: Adult Inpatient Plan of Care  Goal: Patient-Specific Goal (Individualized)  Outcome: Progressing     Problem: Adult Inpatient Plan of Care  Goal: Absence of Hospital-Acquired Illness or Injury  Outcome: Progressing     Problem: Adult Inpatient Plan of Care  Goal: Optimal Comfort and Wellbeing  Outcome: Progressing     Problem: Adult Inpatient Plan of Care  Goal: Readiness for Transition of Care  Outcome: Progressing     Pt. AAOx4. C/O pain during shift; PRN administered. Pt. did work with therapy during shift. Pt. did not tolerate diet during shift. 1 episode of nausea during shift; Pt. Aware PRN available for nausea but denied it at this time. L hip Aquacel in place, C/D/I.  at bedside. Bed alarm on and call light in reach. Pt. Instructed to call for assistance. Pt. Voiced understanding. Plan of care continued.

## 2025-07-03 NOTE — PLAN OF CARE
Problem: Physical Therapy  Goal: Physical Therapy Goal  Description: Goals to be met by: 8/3/25     Patient will increase functional independence with mobility by performin. Supine to sit with Stand-by Assistance  2. Sit to supine with Stand-by Assistance  3. Sit to stand transfer with Stand-by Assistance with use of RW.   4. Bed to chair transfer with Stand-by Assistance using Rolling Walker  5. Gait  x 100 feet with Stand-by Assistance using Rolling Walker.     Outcome: Progressing

## 2025-07-04 PROCEDURE — 25000003 PHARM REV CODE 250: Performed by: ORTHOPAEDIC SURGERY

## 2025-07-04 PROCEDURE — 97116 GAIT TRAINING THERAPY: CPT

## 2025-07-04 PROCEDURE — 63600175 PHARM REV CODE 636 W HCPCS: Mod: JZ,TB | Performed by: ORTHOPAEDIC SURGERY

## 2025-07-04 PROCEDURE — 94761 N-INVAS EAR/PLS OXIMETRY MLT: CPT

## 2025-07-04 PROCEDURE — 97530 THERAPEUTIC ACTIVITIES: CPT

## 2025-07-04 PROCEDURE — 99900035 HC TECH TIME PER 15 MIN (STAT)

## 2025-07-04 PROCEDURE — 97535 SELF CARE MNGMENT TRAINING: CPT

## 2025-07-04 RX ORDER — HYDROMORPHONE HYDROCHLORIDE 2 MG/1
4 TABLET ORAL EVERY 4 HOURS PRN
Refills: 0 | Status: DISCONTINUED | OUTPATIENT
Start: 2025-07-04 | End: 2025-07-05 | Stop reason: HOSPADM

## 2025-07-04 RX ADMIN — ACETAMINOPHEN 1000 MG: 500 TABLET ORAL at 02:07

## 2025-07-04 RX ADMIN — SENNOSIDES AND DOCUSATE SODIUM 1 TABLET: 50; 8.6 TABLET ORAL at 09:07

## 2025-07-04 RX ADMIN — CARVEDILOL 6.25 MG: 6.25 TABLET, FILM COATED ORAL at 09:07

## 2025-07-04 RX ADMIN — POLYETHYLENE GLYCOL 3350 17 G: 17 POWDER, FOR SOLUTION ORAL at 09:07

## 2025-07-04 RX ADMIN — GABAPENTIN 300 MG: 300 CAPSULE ORAL at 02:07

## 2025-07-04 RX ADMIN — KETOROLAC TROMETHAMINE 15 MG: 30 INJECTION, SOLUTION INTRAMUSCULAR; INTRAVENOUS at 05:07

## 2025-07-04 RX ADMIN — LEVOTHYROXINE SODIUM 125 MCG: 0.12 TABLET ORAL at 05:07

## 2025-07-04 RX ADMIN — PANTOPRAZOLE SODIUM 40 MG: 40 TABLET, DELAYED RELEASE ORAL at 09:07

## 2025-07-04 RX ADMIN — ASPIRIN 81 MG CHEWABLE TABLET 81 MG: 81 TABLET CHEWABLE at 09:07

## 2025-07-04 RX ADMIN — VALSARTAN 320 MG: 160 TABLET, FILM COATED ORAL at 09:07

## 2025-07-04 RX ADMIN — OXYCODONE HYDROCHLORIDE 10 MG: 10 TABLET ORAL at 09:07

## 2025-07-04 RX ADMIN — GABAPENTIN 300 MG: 300 CAPSULE ORAL at 09:07

## 2025-07-04 RX ADMIN — ACETAMINOPHEN 1000 MG: 500 TABLET ORAL at 09:07

## 2025-07-04 RX ADMIN — HYDROMORPHONE HYDROCHLORIDE 4 MG: 2 TABLET ORAL at 01:07

## 2025-07-04 RX ADMIN — KETOROLAC TROMETHAMINE 15 MG: 30 INJECTION, SOLUTION INTRAMUSCULAR; INTRAVENOUS at 11:07

## 2025-07-04 RX ADMIN — CALCIUM CARBONATE (ANTACID) CHEW TAB 500 MG 500 MG: 500 CHEW TAB at 09:07

## 2025-07-04 NOTE — PT/OT/SLP PROGRESS
"Occupational Therapy   Treatment    Name: Parris Guevara  MRN: [id number]  Admitting Diagnosis:  History of left hip replacement  2 Days Post-Op    Recommendations:     Discharge Recommendations: Low Intensity Therapy  Discharge Equipment Recommendations:  shower chair  Barriers to discharge:   (pain)    Assessment:     Parris Guevara is a 59 y.o. female with a medical diagnosis of History of left hip replacement.  She presents with significant pain limiting independence with ADLs and functional mobility. Performance deficits affecting function are weakness, impaired endurance, pain, decreased lower extremity function, impaired self care skills, impaired functional mobility, gait instability, impaired balance, orthopedic precautions.     Rehab Prognosis:  Good; patient would benefit from acute skilled OT services to address these deficits and reach maximum level of function.       Plan:     Patient to be seen 5 x/week to address the above listed problems via self-care/home management, therapeutic activities, therapeutic exercises  Plan of Care Expires: 07/24/25  Plan of Care Reviewed with: patient, spouse    Subjective     Chief Complaint: "It just hurts so much"  Patient/Family Comments/goals: To not be in so much pain  Pain/Comfort:  Pain Rating 1: 9/10  Location - Side 1: Left  Location - Orientation 1: generalized  Location 1: hip  Pain Addressed 1: Pre-medicate for activity, Reposition, Distraction  Pain Rating Post-Intervention 1: 8/10    Objective:     Communicated with: RN and PTPam prior to session.  Patient found sitting edge of bed with peripheral IV, PureWick upon OT entry to room.    General Precautions: Standard, fall    Orthopedic Precautions:LLE posterior precautions, LUE weight bearing as tolerated  Braces: N/A  Respiratory Status: Room air     Occupational Performance:     Functional Mobility/Transfers:  Patient completed Sit <> Stand Transfer with contact guard assistance  with  " rolling walker   Patient completed Bed <> Chair Transfer using Step Transfer technique with contact guard assistance with rolling walker  Functional Mobility: Pt performed initial STS from bed with min assist. Pt performed functional mobility in hallway with two seated rest breaks required due to pain. Pt required CGA for STS from chair both times.     Activities of Daily Living:  Pt declined toileting during session. Purewick removed.    [facility] 6 Click ADL: 17    Treatment & Education:  Pt able to recall all hip precautions during session. Pt encouraged to use the bathroom while in the hospital in order to not be dependent on Purewick. Pt agreed and states she will call staff when she is ready. Education on proper toilet transfer discussed with use of rails while trying to maintain precautions. Pt encouraged to perform BUE therex as well while sitting [facility] and while in hospital in order to decrease deconditioning.     Patient left up in chair with all lines intact, call button in reach, nursing notified, and spouse present    GOALS:   Multidisciplinary Problems       Occupational Therapy Goals          Problem: Occupational Therapy    Goal Priority Disciplines Outcome Interventions   Occupational Therapy Goal     OT, PT/OT Progressing    Description: Goals to be met by: 7/24/2025     Patient will increase functional independence with ADLs by performing:    LE Dressing with Stand-by Assistance with use of hip kit and implementation of hip precautions  Toileting from toilet with Stand-by Assistance for hygiene and clothing management.   Toilet transfer to toilet with Stand-by Assistance and use of rolling walker.  Functional mobility to/from bathroom with SBA and rolling walker.  100% reciprocation of 3/3 hip precautions, DME recommendations, and ADL/task modifications post L ADDY to support safe d/c at highest level of function.                                DME Justifications:  No DME recommended requiring DME  justifications    Time Tracking:     OT Date of Treatment: 07/04/25  OT Start Time: 1000  OT Stop Time: 1023  OT Total Time (min): 23 min (portion overlapped with PT)    Billable Minutes:Self Care/Home Management 8  Therapeutic Activity 15    OT/MARIANA: OT          7/4/2025

## 2025-07-04 NOTE — NURSING
Assumed care of patient from GENI Collins, patient is AAOX4, room air, vitals WNL, swallows pills whole, SBAX1, external purewick in place, walker within reach of the patient, family to remain at bedside, left hip sx on 7/3 clean dry and intact with island dressing, all safety precautions are in place, call bell and tray table within reach of the patient, no additional questions or concerns from the patient at this time.

## 2025-07-04 NOTE — PLAN OF CARE
Problem: Adult Inpatient Plan of Care  Goal: Plan of Care Review  Outcome: Progressing     Problem: Adult Inpatient Plan of Care  Goal: Patient-Specific Goal (Individualized)  Outcome: Progressing     Problem: Adult Inpatient Plan of Care  Goal: Absence of Hospital-Acquired Illness or Injury  Outcome: Progressing     Problem: Adult Inpatient Plan of Care  Goal: Optimal Comfort and Wellbeing  Outcome: Progressing     Problem: Adult Inpatient Plan of Care  Goal: Readiness for Transition of Care  Outcome: Progressing     Problem: Diabetes Comorbidity  Goal: Blood Glucose Level Within Targeted Range  Outcome: Progressing  Pt. AAOx4. C/O pain during shift; PRN administered and PRNs modified per MD order. Pt. Worked with therapy during shift. Pt. Tolerating diet better than yesterday so far. Pt. Aware PRN available for nausea and pain, if needed. L hip Aquacel in place, C/D/I.  at bedside. Bed alarm on and call light in reach. Pt. Instructed to call for assistance. Pt. Voiced understanding. Plan of care continued.

## 2025-07-04 NOTE — PLAN OF CARE
Problem: Physical Therapy  Goal: Physical Therapy Goal  Description: Goals to be met by: 8/3/25     Patient will increase functional independence with mobility by performin. Supine to sit with Stand-by Assistance  2. Sit to supine with Stand-by Assistance  3. Sit to stand transfer with Stand-by Assistance with use of RW.   4. Bed to chair transfer with Stand-by Assistance using Rolling Walker  5. Gait  x 100 feet with Stand-by Assistance using Rolling Walker.     2025 1451 by Pam Srivastava, PT  Outcome: Progressing    Pt participates in transfers to standing and ambulation with use of RW. Therapy will continue to progress pt as able. Pt still limited with gait speed/distance/pattern due to L hip pain.

## 2025-07-04 NOTE — PT/OT/SLP PROGRESS
Physical Therapy Treatment    Patient Name:  Parris Guevara   MRN:  [id number]    Recommendations:     Discharge Recommendations: Low Intensity Therapy (HH PT with family assistance)  Discharge Equipment Recommendations: shower chair (transport chair (for extended distance))  Barriers to discharge: pain- limiting functional endurance/independence    Assessment:     Parris Guevara is a 59 y.o. female admitted with a medical diagnosis of History of left hip replacement.  She presents with the following impairments/functional limitations: weakness, impaired endurance, impaired self care skills, impaired functional mobility, gait instability, impaired balance, decreased lower extremity function, pain, decreased safety awareness, decreased ROM, orthopedic precautions.    Pt participates in transfers to standing and ambulation with use of RW. Therapy will continue to progress pt as able. Pt still limited with gait speed/distance/pattern due to L hip pain.    Rehab Prognosis: Good; patient would benefit from acute skilled PT services to address these deficits and reach maximum level of function.    Recent Surgery: Procedure(s) (LRB):  ARTHROPLASTY, HIP (Left) 2 Days Post-Op    Plan:     During this hospitalization, patient to be seen BID to address the identified rehab impairments via gait training, therapeutic activities, therapeutic exercises, neuromuscular re-education and progress toward the following goals:    Plan of Care Expires:  08/03/25    Subjective     Chief Complaint: pain  Patient/Family Comments/goals: to decrease pain  Pain/Comfort:  Pain Rating 1:  (4/10 at rest; 8/10 with mobility; 6/10 at rest post mobility)  Location - Side 1: Left  Location 1: hip  Pain Addressed 1: Pre-medicate for activity, Reposition, Cessation of Activity, Nurse notified  Pain Rating Post-Intervention 1: 6/10      Objective:     Communicated with Nurse prior to session.  Patient found up in chair with Masood upon PT  entry to room.     General Precautions: Standard, fall  Orthopedic Precautions: LLE weight bearing as tolerated, LLE posterior precautions  Braces:  (hip abduction pillow)  Respiratory Status: Room air     Functional Mobility:  Transfers:     Sit to Stand:  MIN A/CGA with rolling walker  Gait: ~45ft with use of RW and MIN A/CGA; noted limited BLE step length/height and speed due to pain; standing rest break between distance       AM-PAC 6 CLICK MOBILITY  Turning over in bed (including adjusting bedclothes, sheets and blankets)?: 2  Sitting down on and standing up from a chair with arms (e.g., wheelchair, bedside commode, etc.): 3  Moving from lying on back to sitting on the side of the bed?: 2  Moving to and from a bed to a chair (including a wheelchair)?: 3  Need to walk in hospital room?: 3  Climbing 3-5 steps with a railing?: 1  Basic Mobility Total Score: 14       Treatment & Education:  Verbal cues for sequencing with use of RW and proper BLE stepping pattern with ambulation.   With noted continued L knee buckling with ambulation due to pain but no complete LOB.   Pt stating she would like to spend another night for pain control and further progress with therapy tomorrow in hospital- pt reports- feels like things are getting better but not good enough yet to go home with main assistance per spouse; nursing notified.     Patient left up in chair with all lines intact, call button in reach, Nurse notified, and spouse present.    GOALS:   Multidisciplinary Problems       Physical Therapy Goals          Problem: Physical Therapy    Goal Priority Disciplines Outcome Interventions   Physical Therapy Goal     PT, PT/OT Progressing    Description: Goals to be met by: 8/3/25     Patient will increase functional independence with mobility by performin. Supine to sit with Stand-by Assistance  2. Sit to supine with Stand-by Assistance  3. Sit to stand transfer with Stand-by Assistance with use of RW.   4. Bed to  chair transfer with Stand-by Assistance using Rolling Walker  5. Gait  x 100 feet with Stand-by Assistance using Rolling Walker.                          DME Justifications:  Parris Guevara has a mobility limitation that significantly impairs her ability to participate in one or more mobility related activities of daily living (MRADLs) such as toileting, feeding, dressing, grooming, and bathing in customary locations in the home.  The mobility limitation cannot be sufficiently resolved by the use of a cane or walker.   Patients upper body strength is not sufficient to propel a standard WC. The use of a lightweight wheelchair will significantly improve the patients ability to participate in MRADLS and the patient will use it on regular basis in the home.   She has a caregiver who is available, willing, and able to provide assistance with the wheelchair when needed. *transport chair for extended distance    Time Tracking:     PT Received On: 07/04/25  PT Start Time: 1417     PT Stop Time: 1446  PT Total Time (min): 29 min     Billable Minutes: Gait Training 29    Treatment Type: Treatment  PT/PTA: PT     Number of PTA visits since last PT visit: 0     07/04/2025

## 2025-07-04 NOTE — PLAN OF CARE
Problem: Adult Inpatient Plan of Care  Goal: Plan of Care Review  Outcome: Progressing  Goal: Patient-Specific Goal (Individualized)  Outcome: Progressing  Goal: Absence of Hospital-Acquired Illness or Injury  Outcome: Progressing  Goal: Optimal Comfort and Wellbeing  Outcome: Progressing  Goal: Readiness for Transition of Care  Outcome: Progressing     Problem: Diabetes Comorbidity  Goal: Blood Glucose Level Within Targeted Range  Outcome: Progressing     Problem: Wound  Goal: Optimal Coping  Outcome: Progressing  Goal: Optimal Functional Ability  Outcome: Progressing  Goal: Absence of Infection Signs and Symptoms  Outcome: Progressing  Goal: Improved Oral Intake  Outcome: Progressing  Goal: Optimal Pain Control and Function  Outcome: Progressing  Goal: Skin Health and Integrity  Outcome: Progressing  Goal: Optimal Wound Healing  Outcome: Progressing     Problem: Infection  Goal: Absence of Infection Signs and Symptoms  Outcome: Progressing     Problem: Hip Arthroplasty  Goal: Optimal Coping  Outcome: Progressing  Goal: Absence of Bleeding  Outcome: Progressing  Goal: Effective Bowel Elimination  Outcome: Progressing  Goal: Fluid and Electrolyte Balance  Outcome: Progressing  Goal: Optimal Functional Ability  Outcome: Progressing  Goal: Absence of Infection Signs and Symptoms  Outcome: Progressing  Goal: Intact Neurovascular Status  Outcome: Progressing  Goal: Anesthesia/Sedation Recovery  Outcome: Progressing  Goal: Acceptable Pain Control  Outcome: Progressing  Goal: Nausea and Vomiting Relief  Outcome: Progressing  Goal: Effective Urinary Elimination  Outcome: Progressing  Goal: Effective Oxygenation and Ventilation  Outcome: Progressing     Problem: Fall Injury Risk  Goal: Absence of Fall and Fall-Related Injury  Outcome: Progressing

## 2025-07-04 NOTE — PLAN OF CARE
Problem: Physical Therapy  Goal: Physical Therapy Goal  Description: Goals to be met by: 8/3/25     Patient will increase functional independence with mobility by performin. Supine to sit with Stand-by Assistance  2. Sit to supine with Stand-by Assistance  3. Sit to stand transfer with Stand-by Assistance with use of RW.   4. Bed to chair transfer with Stand-by Assistance using Rolling Walker  5. Gait  x 100 feet with Stand-by Assistance using Rolling Walker.     Outcome: Progressing     PT session with some OT overlap for increased safety with progress of mobility. Pt participates in transfers to standing and ambulation with use of RW. Therapy will continue to progress pt as able. Limited gait distance/independence due to pain level in L hip.

## 2025-07-04 NOTE — NURSING
Bedside nurse spoke with Dr. Leigh pertaining to Pt. PRN medications for pain. MD stated to discontinue both PRN oxycodones and to place an order for 4mg dilaudid PO q 4 PRN. Orders placed and oxycodone discontinued. Plan of care continued.

## 2025-07-04 NOTE — PT/OT/SLP PROGRESS
Physical Therapy Treatment    Patient Name:  Parris Guevara   MRN:  [id number]    Recommendations:     Discharge Recommendations: Low Intensity Therapy (HH PT with family assistance)  Discharge Equipment Recommendations: shower chair  Barriers to discharge: pain/limited functional endurance and independence with mobility    Assessment:     Parris Guevara is a 59 y.o. female admitted with a medical diagnosis of History of left hip replacement.  She presents with the following impairments/functional limitations: weakness, impaired endurance, impaired self care skills, impaired functional mobility, gait instability, impaired balance, decreased lower extremity function, pain, decreased ROM, orthopedic precautions.    PT session with some OT overlap for increased safety with progress of mobility. Pt participates in transfers to standing and ambulation with use of RW. Therapy will continue to progress pt as able. Limited gait distance/independence due to pain level in L hip.     Rehab Prognosis: Good; patient would benefit from acute skilled PT services to address these deficits and reach maximum level of function.    Recent Surgery: Procedure(s) (LRB):  ARTHROPLASTY, HIP (Left) 2 Days Post-Op    Plan:     During this hospitalization, patient to be seen BID to address the identified rehab impairments via gait training, therapeutic activities, therapeutic exercises, neuromuscular re-education and progress toward the following goals:    Plan of Care Expires:  08/03/25    Subjective     Chief Complaint: pain with mobility  Patient/Family Comments/goals: to continue to progress independence  Pain/Comfort:  Pain Rating 1:  (mild pain at rest; increased to 8/10 with mobility to L hip)  Pain Addressed 1: Pre-medicate for activity, Reposition, Cessation of Activity, Nurse notified  Pain Rating Post-Intervention 1: 8/10      Objective:     Communicated with Nurse prior to session.  Patient found sitting edge of bed with  Masood upon PT entry to room.     General Precautions: Standard, fall  Orthopedic Precautions: LLE weight bearing as tolerated, LLE posterior precautions  Braces:  (hip abduction pillow)  Respiratory Status: Room air     Functional Mobility:  Transfers:     Sit to Stand:  minimum assistance with rolling walker  Bed to Chair: contact guard assistance with  rolling walker  using  Step Transfer  Gait: ~2ft with use of RW and MIN A; seated break; additional 15ft with use of RW and MIN A/CGA; seated break; additional 12ft with use of RW and CGA, seated break; additional 8ft with use of RW and CGA.       AM-PAC 6 CLICK MOBILITY  Turning over in bed (including adjusting bedclothes, sheets and blankets)?: 2  Sitting down on and standing up from a chair with arms (e.g., wheelchair, bedside commode, etc.): 3  Moving from lying on back to sitting on the side of the bed?: 2  Moving to and from a bed to a chair (including a wheelchair)?: 3  Need to walk in hospital room?: 3  Climbing 3-5 steps with a railing?: 1  Basic Mobility Total Score: 14       Treatment & Education:  Pt educated on proper hand positioning/safe use of RW and proper BLE stepping pattern with ambulation.   Pt requires seated breaks between mobility due increasing pain level with ambulation; noted L knee buckling due to pain but no complete LOB.   Pt educated on use of call button for mobility assistance in room; pt understanding.     Patient left up in chair with all lines intact, call button in reach, chair alarm on, Nurse notified, and family present.    GOALS:   Multidisciplinary Problems       Physical Therapy Goals          Problem: Physical Therapy    Goal Priority Disciplines Outcome Interventions   Physical Therapy Goal     PT, PT/OT Progressing    Description: Goals to be met by: 8/3/25     Patient will increase functional independence with mobility by performin. Supine to sit with Stand-by Assistance  2. Sit to supine with Stand-by  Assistance  3. Sit to stand transfer with Stand-by Assistance with use of RW.   4. Bed to chair transfer with Stand-by Assistance using Rolling Walker  5. Gait  x 100 feet with Stand-by Assistance using Rolling Walker.                          Time Tracking:     PT Received On: 07/04/25  PT Start Time: 0950     PT Stop Time: 1024  PT Total Time (min): 34 min With some OT overlap    Billable Minutes: Gait Training 20 and Therapeutic Activity 10    Treatment Type: Treatment  PT/PTA: PT     Number of PTA visits since last PT visit: 0     07/04/2025

## 2025-07-05 VITALS
HEIGHT: 67 IN | TEMPERATURE: 98 F | SYSTOLIC BLOOD PRESSURE: 116 MMHG | OXYGEN SATURATION: 96 % | WEIGHT: 207 LBS | DIASTOLIC BLOOD PRESSURE: 72 MMHG | BODY MASS INDEX: 32.49 KG/M2 | HEART RATE: 79 BPM | RESPIRATION RATE: 16 BRPM

## 2025-07-05 PROCEDURE — 97535 SELF CARE MNGMENT TRAINING: CPT

## 2025-07-05 PROCEDURE — 25000003 PHARM REV CODE 250: Performed by: ORTHOPAEDIC SURGERY

## 2025-07-05 PROCEDURE — 97530 THERAPEUTIC ACTIVITIES: CPT

## 2025-07-05 PROCEDURE — 63600175 PHARM REV CODE 636 W HCPCS: Performed by: ORTHOPAEDIC SURGERY

## 2025-07-05 PROCEDURE — 94761 N-INVAS EAR/PLS OXIMETRY MLT: CPT

## 2025-07-05 RX ORDER — ONDANSETRON 4 MG/1
4 TABLET, ORALLY DISINTEGRATING ORAL EVERY 6 HOURS PRN
Qty: 28 TABLET | Refills: 1 | Status: SHIPPED | OUTPATIENT
Start: 2025-07-05

## 2025-07-05 RX ADMIN — ONDANSETRON 4 MG: 2 INJECTION INTRAMUSCULAR; INTRAVENOUS at 02:07

## 2025-07-05 RX ADMIN — CARVEDILOL 6.25 MG: 6.25 TABLET, FILM COATED ORAL at 08:07

## 2025-07-05 RX ADMIN — PANTOPRAZOLE SODIUM 40 MG: 40 TABLET, DELAYED RELEASE ORAL at 08:07

## 2025-07-05 RX ADMIN — ASPIRIN 81 MG CHEWABLE TABLET 81 MG: 81 TABLET CHEWABLE at 08:07

## 2025-07-05 RX ADMIN — POLYETHYLENE GLYCOL 3350 17 G: 17 POWDER, FOR SOLUTION ORAL at 08:07

## 2025-07-05 RX ADMIN — CALCIUM CARBONATE (ANTACID) CHEW TAB 500 MG 500 MG: 500 CHEW TAB at 08:07

## 2025-07-05 RX ADMIN — GABAPENTIN 300 MG: 300 CAPSULE ORAL at 02:07

## 2025-07-05 RX ADMIN — ACETAMINOPHEN 1000 MG: 500 TABLET ORAL at 02:07

## 2025-07-05 RX ADMIN — SENNOSIDES AND DOCUSATE SODIUM 1 TABLET: 50; 8.6 TABLET ORAL at 08:07

## 2025-07-05 RX ADMIN — LACTULOSE 10 G: 20 SOLUTION ORAL at 10:07

## 2025-07-05 RX ADMIN — VALSARTAN 320 MG: 160 TABLET, FILM COATED ORAL at 08:07

## 2025-07-05 RX ADMIN — BISACODYL 10 MG: 10 SUPPOSITORY RECTAL at 10:07

## 2025-07-05 RX ADMIN — GABAPENTIN 300 MG: 300 CAPSULE ORAL at 08:07

## 2025-07-05 RX ADMIN — ACETAMINOPHEN 1000 MG: 500 TABLET ORAL at 08:07

## 2025-07-05 RX ADMIN — LEVOTHYROXINE SODIUM 125 MCG: 0.12 TABLET ORAL at 05:07

## 2025-07-05 NOTE — PROGRESS NOTES
"[facility] Surg  Orthopedics  Progress Note    Patient Name: Parris Guevara  MRN: [id number]  Admission Date: 7/2/2025  Hospital Length of Stay: 0 days  Attending Provider: Jethro Leigh MD  Primary Care Provider: Reyna Duval MD  Follow-up For: Procedure(s) (LRB):  ARTHROPLASTY, HIP (Left)    Post-Operative Day: 3 Days Post-Op  Subjective:     Principal Problem:History of left hip replacement    Principal Orthopedic Problem:  T same  Interval History: .  The patient states that she is comfortable.  Reports satisfactory pain control and resolution of constipation and nausea after receiving laxative and a suppository.  The patient reports that she has had a bowel movement in his passing gas.    Review of patient's allergies indicates:  No Known Allergies    Current Facility-Administered Medications   Medication    acetaminophen tablet 1,000 mg    aspirin chewable tablet 81 mg    bisacodyL suppository 10 mg    calcium carbonate 200 mg calcium (500 mg) chewable tablet 500 mg    carvediloL tablet 6.25 mg    gabapentin capsule 300 mg    HYDROmorphone tablet 4 mg    ketorolac injection 15 mg    lactulose 20 gram/30 mL solution Soln 10 g    levothyroxine tablet 125 mcg    ondansetron disintegrating tablet 4 mg    ondansetron injection 4 mg    pantoprazole EC tablet 40 mg    polyethylene glycol packet 17 g    senna-docusate 8.6-50 mg per tablet 1 tablet    sodium chloride 0.9% flush 5 mL    valsartan tablet 320 mg    zolpidem tablet 5 mg     Objective:     Vital Signs (Most Recent):  Temp: 98.4 °F (36.9 °C) (07/05/25 1219)  Pulse: 79 (07/05/25 1219)  Resp: 16 (07/05/25 1219)  BP: 116/72 (07/05/25 1219)  SpO2: 96 % (07/05/25 1219) Vital Signs (24h Range):  Temp:  [97.5 °F (36.4 °C)-98.4 °F (36.9 °C)] 98.4 °F (36.9 °C)  Pulse:  [69-82] 79  Resp:  [16-20] 16  SpO2:  [95 %-98 %] 96 %  BP: (102-116)/(52-72) 116/72     Weight: 93.9 kg (207 lb)  Height: 5' 7" (170.2 cm)  Body mass index is 32.42 " kg/m².      Intake/Output Summary (Last 24 hours) at 7/5/2025 1411  Last data filed at 7/5/2025 0400  Gross per 24 hour   Intake 480 ml   Output 0 ml   Net 480 ml       Ortho/SPM Exam    Appears alert and comfortable resting in bed  Abdomen soft and nontender  Left hip dressing dry and intact  Left lower extremity neurovascular intact    Significant Labs: None    Significant Imaging:  None    Assessment/Plan:     Active Diagnoses:    Diagnosis Date Noted POA    PRINCIPAL PROBLEM:  History of left hip replacement [Z96.642] 07/02/2025 Not Applicable      Problems Resolved During this Admission:     Hemodynamically stable without evident complication or impediment to discharge after hip replacement    We will discharge home for today      Jethro Leigh MD  Orthopedics  [facility]

## 2025-07-05 NOTE — PT/OT/SLP PROGRESS
Physical Therapy Treatment    Patient Name:  Parris Guevara   MRN:  [id number]    Recommendations:     Discharge Recommendations: Low Intensity Therapy  Discharge Equipment Recommendations: shower chair (transport chair)  Barriers to discharge: None    Assessment:     Parris Guevara is a 59 y.o. female admitted with a medical diagnosis of History of left hip replacement.  She presents with the following impairments/functional limitations: weakness, impaired endurance, impaired self care skills, impaired functional mobility, gait instability, impaired balance, decreased lower extremity function, decreased safety awareness, pain, decreased ROM, orthopedic precautions Pr was able to transfer supine to sit with SBA.  She transferred sit to stand with a RW and SBA.  She then ambulated 80 ft with a RW and SBA.    Rehab Prognosis: Good; patient would benefit from acute skilled PT services to address these deficits and reach maximum level of function.    Recent Surgery: Procedure(s) (LRB):  ARTHROPLASTY, HIP (Left) 3 Days Post-Op    Plan:     During this hospitalization, patient to be seen BID to address the identified rehab impairments via gait training, therapeutic activities, therapeutic exercises, neuromuscular re-education and progress toward the following goals:    Plan of Care Expires:  08/03/25    Subjective     Chief Complaint: L hip pain with ambulation  Patient/Family Comments/goals: To return home  Pain/Comfort:  Pain Rating 1: 0/10  Location - Side 1: Left  Location 1: hip  Pain Rating Post-Intervention 1: 5/10      Objective:     Communicated with Nurse prior to session.  Patient found HOB elevated with peripheral IV upon PT entry to room.     General Precautions: Standard, fall  Orthopedic Precautions: LLE weight bearing as tolerated, LLE posterior precautions  Braces: N/A  Respiratory Status: Room air     Functional Mobility:  Bed Mobility:     Supine to Sit: stand by assistance  Sit to Supine:  stand by assistance  Transfers:     Sit to Stand:  stand by assistance with rolling walker  Gait: Pt was able to ambulate 80 ft with a RW and SBA      AM-PAC 6 CLICK MOBILITY  Turning over in bed (including adjusting bedclothes, sheets and blankets)?: 4  Sitting down on and standing up from a chair with arms (e.g., wheelchair, bedside commode, etc.): 3  Moving from lying on back to sitting on the side of the bed?: 3  Moving to and from a bed to a chair (including a wheelchair)?: 3  Need to walk in hospital room?: 3  Climbing 3-5 steps with a railing?: 3  Basic Mobility Total Score: 19       Treatment & Education:  Plan of Care  Transfer and gait training with a RW    Patient left HOB elevated with all lines intact, call button in reach, Nurse notified, and spouse present..    GOALS:   Multidisciplinary Problems       Physical Therapy Goals       Not on file              Multidisciplinary Problems (Resolved)          Problem: Physical Therapy    Goal Priority Disciplines Outcome Interventions   Physical Therapy Goal   (Resolved)     PT, PT/OT Met    Description: Goals to be met by: 8/3/25     Patient will increase functional independence with mobility by performin. Supine to sit with Stand-by Assistance  2. Sit to supine with Stand-by Assistance  3. Sit to stand transfer with Stand-by Assistance with use of RW.   4. Bed to chair transfer with Stand-by Assistance using Rolling Walker  5. Gait  x 100 feet with Stand-by Assistance using Rolling Walker.                          DME Justifications:  No DME recommended requiring DME justifications    Time Tracking:     PT Received On: 25  PT Start Time: 1407     PT Stop Time: 1419  PT Total Time (min): 12 min     Billable Minutes: Therapeutic Activity 12    Treatment Type: Treatment  PT/PTA: PT     Number of PTA visits since last PT visit: 0     2025

## 2025-07-05 NOTE — PLAN OF CARE
VN note: VN completed AVS and attachments and notified bedside nurse, Denise. Will cont to be available and intervene prn.

## 2025-07-05 NOTE — PLAN OF CARE
[facility]  Discharge Final Note    Primary Care Provider: Reyna Duval MD    Expected Discharge Date: 7/5/2025    Final Discharge Note (most recent)       Final Note - 07/05/25 1450          Final Note    Assessment Type Final Discharge Note (P)      Anticipated Discharge Disposition Home-Health Care Svc (P)    [facility]    Hospital Resources/Appts/Education Provided Appointments scheduled and added to AVS (P)         Post-Acute Status    Post-Acute Authorization Home Health (P)      Home Health Status Set-up Complete/Auth obtained (P)    [facility]                    Contact Info       Jethro Leigh MD   Specialty: Orthopedic Surgery    [address]  [address]  [place] [address]   Phone: [phone]       Next Steps: Follow up on 7/17/2025    Instructions: at 9:15 AM; [facility] surg hospital follow up appointment with x-ray at 9:00 AM    *[facility]   Specialty: Home Health Services, Home Therapy Services, Home Living Aide Services    [address] [place] [address]   Phone: [phone]       Next Steps: Follow up    Instructions: will provide home health services

## 2025-07-05 NOTE — DISCHARGE SUMMARY
DISCHARGE SUMMARY      Date and time of Admission: 7/2/2025 10:30 AM    Date of Discharge:7/5/2025    Discharge Diagnoses:    Active Hospital Problems    Diagnosis  POA    *History of left hip replacement [Z96.642]  Not Applicable      Resolved Hospital Problems   No resolved problems to display.       Discharge Medications:       Medication List        START taking these medications      ondansetron 4 MG Tbdl  Commonly known as: ZOFRAN-ODT  Take 1 tablet (4 mg total) by mouth every 6 (six) hours as needed (Nausea).     oxyCODONE-acetaminophen 5-325 mg per tablet  Commonly known as: PERCOCET  Take 1 tablet by mouth every 6 (six) hours as needed for Pain.            CONTINUE taking these medications      calcium carbonate 500 mg calcium (1,250 mg) tablet  Commonly known as: OS-EDITA     carvediloL 6.25 MG tablet  Commonly known as: COREG  Take 1 tablet (6.25 mg total) by mouth 2 (two) times daily. DOSE INCREASE     ergocalciferol 50,000 unit Cap  Commonly known as: ERGOCALCIFEROL  Take 1 capsule (50,000 Units total) by mouth every 7 days.     gabapentin 300 MG capsule  Commonly known as: NEURONTIN     lactulose 20 gram/30 mL Soln  Commonly known as: CHRONULAC  Take 15 mLs (10 g total) by mouth 2 (two) times daily as needed.     levothyroxine 125 MCG tablet  Commonly known as: SYNTHROID  TAKE 1 TABLET BY MOUTH EVERY DAY     meloxicam 15 MG tablet  Commonly known as: MOBIC     OZEMPIC 0.25 mg or 0.5 mg (2 mg/3 mL) pen injector  Generic drug: semaglutide  Inject 0.5 mg into the skin every 7 days.     pantoprazole 40 MG tablet  Commonly known as: PROTONIX  Take 1 tablet (40 mg total) by mouth once daily.     triamcinolone acetonide 0.1% 0.1 % cream  Commonly known as: KENALOG  APPLY TO AFFECTED AREA TWICE A DAY DO NOT APPLY TO FACE, GROIN OR UNDERARM     valsartan 320 MG tablet  Commonly known as: DIOVAN  TAKE 1 TABLET BY MOUTH EVERY DAY            STOP taking these medications      HUMIRA PEN Pnkt injection  Generic drug:  adalimumab               Where to Get Your Medications        These medications were sent to [facility]pharm[id number] - [place], [place] [facility] [address] [facility] [facility]  [address] [facility] [address] [place] [address]      Phone: [phone]   ondansetron 4 MG Tbdl       These medications were sent to [facility]  [address], [place] [address]      Hours: Mon-Fri, 8a-5:30p Phone: [phone]   oxyCODONE-acetaminophen 5-325 mg per tablet         Follow-up (including scheduled tests):    History of Present Illness: See H&P    Past Medical History:    Past Medical History:   Diagnosis Date    Arthritis     Diabetes mellitus, type 2     Essential (primary) hypertension     Fatty liver     Hyperlipidemia     Hypertension     Hyperthyroidism     Hypothyroidism     Psoriasis     Spleen enlarged        Allergies: Patient has no known allergies.    Hospital Course:    The patient underwent surgery on the day of admission. The procedure was uneventful and the patient tolerated well. Post operatively the patient was hemodynamically stable and made appropriate progress in therapy. There were no evident acute postoperative complications.    Procedures:  Left total hip replacement    Discharge Physical Exam: See progress note    Condition: Improved    Activity: WBAT    Diet: Resume preadmission diet    Disposition: Home with services

## 2025-07-05 NOTE — PLAN OF CARE
1435  DC order noted. Pt previously accepted by [facility]. HH orders sent to [facility] via [employer].     Previously scheduled post-op appt with Dr Leigh on 7/17/2025 at 0915 wx-ray at 0900. Information added to the pt's discharge paperwork .     1440  Patient resting quietly in bed with spouse, Brandon Guevara ([phone]), at the bedside when CM rounded via [employer]. Patient in agreement with plan to discharge home with [place] today, denied the need for assistance with transportation at time of discharge, & verbalized understanding regarding the hospital follow up appointment with Dr Leigh.     1450  Message sent to nurse Walker informing that the pt is cleared to discharge.       Will continue to follow.

## 2025-07-05 NOTE — NURSING
AVS reviewed with patient by virtual nurse. Verbalized understanding of upcoming appointments and home medications. IV removed. Prescriptions sent to preferred pharmacy. Voiced no concerns. W/c transport to escort patient and belongings to lobby when ride arrives.

## 2025-07-05 NOTE — PT/OT/SLP PROGRESS
Occupational Therapy   Treatment and Discharge    Name: Parris Guevara  MRN: [id number]  Admitting Diagnosis:  History of left hip replacement  3 Days Post-Op    Recommendations:     Discharge Recommendations: Low Intensity Therapy  Discharge Equipment Recommendations:  shower chair (transport chair (for extended distance))  Barriers to discharge:  None    Assessment:     Parris Guevara is a 59 y.o. female with a medical diagnosis of History of left hip replacement.  She presents with . Performance deficits affecting function are weakness, impaired endurance, pain, decreased lower extremity function, impaired self care skills, impaired functional mobility, gait instability, impaired balance, orthopedic precautions.       Plan:     Patient is now appropriate for transition to alternative level of care with no functional concerns for safety upon return home. Discharge OT    Subjective     Chief Complaint: Anticipated pain with standing  Patient/Family Comments/goals: To return home today  Pain/Comfort:  Pain Rating 1: 0/10  Location - Side 1: Left  Location - Orientation 1: generalized  Location 1: hip  Pain Addressed 1: Reposition, Distraction, Cessation of Activity  Pain Rating Post-Intervention 1: 5/10    Objective:     Communicated with: nsg prior to session.  Patient found HOB elevated with peripheral IV upon OT entry to room.    General Precautions: Standard, fall    Orthopedic Precautions:LLE weight bearing as tolerated, LLE posterior precautions  Braces: N/A  Respiratory Status: Room air     Occupational Performance:     Bed Mobility:    Patient completed Rolling/Turning to Left with  modified independence  Patient completed Scooting/Bridging with modified independence  Patient completed Supine to Sit with modified independence  Patient completed Sit to Supine with modified independence     Functional Mobility/Transfers:  Patient completed Sit <> Stand Transfer with modified independence and  supervision  with  rolling walker   Functional Mobility: Pt with fair to fair+ dynamic seated and standing balance.     Activities of Daily Living:  Upper Body Dressing: modified independence to don gown as robe seated EOB  Toileting: modified independence for pericare, no clothing management required at this time  Pt declined LE dressing in preparation for return home but verbalized understanding for adaptive dressing techniques      [facility] 6 Click ADL: 22    Treatment & Education:  Pt educated on role of OT and POC.   Pt performing skills as listed above.      Patient left sitting edge of bed with all lines intact, call button in reach, nsg notified, and spouse present    GOALS:   Multidisciplinary Problems       Occupational Therapy Goals          Problem: Occupational Therapy    Goal Priority Disciplines Outcome Interventions   Occupational Therapy Goal     OT, PT/OT Progressing    Description: Goals to be met by: 7/24/2025     Patient will increase functional independence with ADLs by performing:    LE Dressing with Stand-by Assistance with use of hip kit and implementation of hip precautions  Toileting from toilet with Stand-by Assistance for hygiene and clothing management.   Toilet transfer to toilet with Stand-by Assistance and use of rolling walker.  Functional mobility to/from bathroom with SBA and rolling walker.  100% reciprocation of 3/3 hip precautions, DME recommendations, and ADL/task modifications post L ADDY to support safe d/c at highest level of function.                                  Time Tracking:     OT Date of Treatment: 07/05/25  OT Start Time: 1406  OT Stop Time: 1420  OT Total Time (min): 14 min    Billable Minutes:Self Care/Home Management 14    OT/MARIANA: OT     Number of MARIANA visits since last OT visit: 0    7/5/2025

## 2025-07-05 NOTE — PLAN OF CARE
AVS and educational attachments shared with patient and  via [employer]. Discussed thoroughly. Patient and  verbalized clear understanding using teach back method. Notified bedside nurse of completion of education. At present no distress noted. Patient to be discharged via w/c with escort service and family with all of patient's belonings. Will cont to be available to patient and family and intervene prn.

## 2025-07-17 ENCOUNTER — OFFICE VISIT (OUTPATIENT)
Dept: ORTHOPEDICS | Facility: CLINIC | Age: 60
End: 2025-07-17
Payer: MEDICAID

## 2025-07-17 ENCOUNTER — HOSPITAL ENCOUNTER (OUTPATIENT)
Facility: HOSPITAL | Age: 60
Discharge: HOME | End: 2025-07-17
Attending: ORTHOPAEDIC SURGERY
Payer: MEDICAID

## 2025-07-17 DIAGNOSIS — M16.12 PRIMARY OSTEOARTHRITIS OF LEFT HIP: Primary | ICD-10-CM

## 2025-07-17 DIAGNOSIS — Z96.642 S/P TOTAL LEFT HIP ARTHROPLASTY: ICD-10-CM

## 2025-07-17 PROCEDURE — 3044F HG A1C LEVEL LT 7.0%: CPT | Mod: CPTII,,, | Performed by: ORTHOPAEDIC SURGERY

## 2025-07-17 PROCEDURE — 3061F NEG MICROALBUMINURIA REV: CPT | Mod: CPTII,,, | Performed by: ORTHOPAEDIC SURGERY

## 2025-07-17 PROCEDURE — 1159F MED LIST DOCD IN RCRD: CPT | Mod: CPTII,,, | Performed by: ORTHOPAEDIC SURGERY

## 2025-07-17 PROCEDURE — 73502 X-RAY EXAM HIP UNI 2-3 VIEWS: CPT | Mod: 26,LT,, | Performed by: RADIOLOGY

## 2025-07-17 PROCEDURE — 1160F RVW MEDS BY RX/DR IN RCRD: CPT | Mod: CPTII,,, | Performed by: ORTHOPAEDIC SURGERY

## 2025-07-17 PROCEDURE — 99213 OFFICE O/P EST LOW 20 MIN: CPT | Mod: PBBFAC,25,PN | Performed by: ORTHOPAEDIC SURGERY

## 2025-07-17 PROCEDURE — 3066F NEPHROPATHY DOC TX: CPT | Mod: CPTII,,, | Performed by: ORTHOPAEDIC SURGERY

## 2025-07-17 PROCEDURE — 99024 POSTOP FOLLOW-UP VISIT: CPT | Mod: ,,, | Performed by: ORTHOPAEDIC SURGERY

## 2025-07-17 PROCEDURE — 73502 X-RAY EXAM HIP UNI 2-3 VIEWS: CPT | Mod: TC,PN,LT

## 2025-07-17 PROCEDURE — 4010F ACE/ARB THERAPY RXD/TAKEN: CPT | Mod: CPTII,,, | Performed by: ORTHOPAEDIC SURGERY

## 2025-07-17 PROCEDURE — 99999 PR PBB SHADOW E&M-EST. PATIENT-LVL III: CPT | Mod: PBBFAC,,, | Performed by: ORTHOPAEDIC SURGERY

## 2025-07-17 NOTE — PROGRESS NOTES
Subjective:      Patient ID: Parris Guevara is a 59 y.o. female.    Chief Complaint: Post-op Evaluation of the Left Hip and Post-op Evaluation (2 wk p/o- left ADDY)      HPI:  Two weeks postop  The patient is seen for postop follow-up of left  ADDY.  Pain control has been satisfactory  They feel that they are ambulating easily  Postoperative complaints include:  None at this time    Current Medications[1]  Review of patient's allergies indicates:  No Known Allergies    There were no vitals taken for this visit.    ROS        Objective:    Ortho Exam          Alert, oriented, no acute distress  Sclera-Normal  Respiratory distress-none  Gait:  Minimal limp  Wound:  Healing cleanly  Range of motion:  Painless  Distal perfusion:  Intact  Distal neurologic:  Intact    Imaging:  Left hip radiographs show well-positioned well-fixed total hip implant without complicating process.      Assessment:             1. Primary osteoarthritis of left hip    2. S/P total left hip arthroplasty        The patient is recovering as expected from the procedure.  There is no evidence of surgical site complication        Plan:          No follow-ups on file.    I explained my assessment, and I reviewed the natural history of recovery from this procedure.  Appropriate progression of physical activity, exercise and therapy was discussed.    We discussed formal PT versus self-directed rehabilitation.  After reviewing the options the patient and I agree that she prefers to do a self-directed program which we reviewed.               [1]   Current Outpatient Medications:     carvediloL (COREG) 6.25 MG tablet, Take 1 tablet (6.25 mg total) by mouth 2 (two) times daily. DOSE INCREASE, Disp: 60 tablet, Rfl: 6    gabapentin (NEURONTIN) 300 MG capsule, Take 300 mg by mouth 3 (three) times daily., Disp: , Rfl:     levothyroxine (SYNTHROID) 125 MCG tablet, TAKE 1 TABLET BY MOUTH EVERY DAY, Disp: 90 tablet, Rfl: 3    meloxicam (MOBIC) 15 MG tablet,  Take 15 mg by mouth., Disp: , Rfl:     ondansetron (ZOFRAN-ODT) 4 MG TbDL, Take 1 tablet (4 mg total) by mouth every 6 (six) hours as needed (Nausea)., Disp: 28 tablet, Rfl: 1    oxyCODONE-acetaminophen (PERCOCET) 5-325 mg per tablet, Take 1 tablet by mouth every 6 (six) hours as needed for Pain., Disp: 45 tablet, Rfl: 0    semaglutide (OZEMPIC) 0.25 mg or 0.5 mg (2 mg/3 mL) pen injector, Inject 0.5 mg into the skin every 7 days., Disp: 9 mL, Rfl: 2    triamcinolone acetonide 0.1% (KENALOG) 0.1 % cream, APPLY TO AFFECTED AREA TWICE A DAY DO NOT APPLY TO FACE, GROIN OR UNDERARM, Disp: 80 g, Rfl: 1    valsartan (DIOVAN) 320 MG tablet, TAKE 1 TABLET BY MOUTH EVERY DAY, Disp: 90 tablet, Rfl: 3    calcium carbonate (OS-EDITA) 500 mg calcium (1,250 mg) tablet, Take 2 tablets by mouth once daily. (Patient not taking: Reported on 7/17/2025), Disp: , Rfl:     ergocalciferol (ERGOCALCIFEROL) 50,000 unit Cap, Take 1 capsule (50,000 Units total) by mouth every 7 days. (Patient not taking: Reported on 7/17/2025), Disp: 12 capsule, Rfl: 3    lactulose (CHRONULAC) 20 gram/30 mL Soln, Take 15 mLs (10 g total) by mouth 2 (two) times daily as needed. (Patient not taking: Reported on 7/17/2025), Disp: 1000 mL, Rfl: 5    pantoprazole (PROTONIX) 40 MG tablet, Take 1 tablet (40 mg total) by mouth once daily. (Patient not taking: Reported on 7/17/2025), Disp: 90 tablet, Rfl: 3

## 2025-08-03 NOTE — TELEPHONE ENCOUNTER
Care Due:                  Date            Visit Type   Department     Provider  --------------------------------------------------------------------------------                                             [facility] INTERNAL  Last Visit: 06-      PRE-OP       MEDICINE       Reyna  Simin                              EP -                              PRIMARY      [facility] INTERNAL  Next Visit: 10-      CARE ([facility])   MEDICINE       [facility]                                                            Last  Test          Frequency    Reason                     Performed    Due Date  --------------------------------------------------------------------------------    Vitamin D...  12 months..  ergocalciferol...........  06- 06-    [employer] Embedded Care Due Messages. Reference number: [id number].   8/03/2025 6:56:05 AM CDT

## 2025-08-04 RX ORDER — LEVOTHYROXINE SODIUM 125 UG/1
125 TABLET ORAL
Qty: 90 TABLET | Refills: 2 | Status: SHIPPED | OUTPATIENT
Start: 2025-08-04

## 2025-08-04 RX ORDER — VALSARTAN 320 MG/1
320 TABLET ORAL
Qty: 90 TABLET | Refills: 3 | Status: SHIPPED | OUTPATIENT
Start: 2025-08-04

## 2025-08-13 ENCOUNTER — OFFICE VISIT (OUTPATIENT)
Dept: ORTHOPEDICS | Facility: CLINIC | Age: 60
End: 2025-08-13
Payer: MEDICAID

## 2025-08-13 VITALS — HEIGHT: 67 IN | WEIGHT: 207 LBS | BODY MASS INDEX: 32.49 KG/M2

## 2025-08-13 DIAGNOSIS — Z96.642 S/P TOTAL LEFT HIP ARTHROPLASTY: Primary | ICD-10-CM

## 2025-08-13 PROCEDURE — 3044F HG A1C LEVEL LT 7.0%: CPT | Mod: CPTII,,, | Performed by: PHYSICIAN ASSISTANT

## 2025-08-13 PROCEDURE — 99024 POSTOP FOLLOW-UP VISIT: CPT | Mod: ,,, | Performed by: PHYSICIAN ASSISTANT

## 2025-08-13 PROCEDURE — 3061F NEG MICROALBUMINURIA REV: CPT | Mod: CPTII,,, | Performed by: PHYSICIAN ASSISTANT

## 2025-08-13 PROCEDURE — 1159F MED LIST DOCD IN RCRD: CPT | Mod: CPTII,,, | Performed by: PHYSICIAN ASSISTANT

## 2025-08-13 PROCEDURE — 99999 PR PBB SHADOW E&M-EST. PATIENT-LVL III: CPT | Mod: PBBFAC,,, | Performed by: PHYSICIAN ASSISTANT

## 2025-08-13 PROCEDURE — 99213 OFFICE O/P EST LOW 20 MIN: CPT | Mod: PBBFAC,PN | Performed by: PHYSICIAN ASSISTANT

## 2025-08-13 PROCEDURE — 3066F NEPHROPATHY DOC TX: CPT | Mod: CPTII,,, | Performed by: PHYSICIAN ASSISTANT

## 2025-08-13 PROCEDURE — 1160F RVW MEDS BY RX/DR IN RCRD: CPT | Mod: CPTII,,, | Performed by: PHYSICIAN ASSISTANT

## 2025-08-13 PROCEDURE — 4010F ACE/ARB THERAPY RXD/TAKEN: CPT | Mod: CPTII,,, | Performed by: PHYSICIAN ASSISTANT

## 2025-09-06 ENCOUNTER — DOCUMENT SCAN (OUTPATIENT)
Dept: HOME HEALTH SERVICES | Facility: HOSPITAL | Age: 60
End: 2025-09-06
Payer: MEDICAID

## (undated) DEVICE — SEAL UNIVERSAL 5MM-8MM XI

## (undated) DEVICE — OBTURATOR BLADELESS 8MM XI CLR

## (undated) DEVICE — SYS SEE SHARP SCOPE ANTIFOG

## (undated) DEVICE — CONTAINER SPECIMEN OR STER 4OZ

## (undated) DEVICE — GOWN NONREINF SET-IN SLV XL

## (undated) DEVICE — SOL ELECTROLUBE ANTI-STIC

## (undated) DEVICE — DRAPE COLUMN DAVINCI XI

## (undated) DEVICE — BAG TISS RETRV MONARCH 10MM

## (undated) DEVICE — COVER OVERHEAD SURG LT BLUE

## (undated) DEVICE — PILLOW ABDUCTION FOAM MED

## (undated) DEVICE — SYR 10CC LUER LOCK

## (undated) DEVICE — SOL WATER STRL IRR 1000ML

## (undated) DEVICE — DRAPE HIP PCH 112X137X89IN

## (undated) DEVICE — BLADE SAW SAG 25.40MM X 1.27MM

## (undated) DEVICE — SUT VICRYL 2-0 36 CT-1

## (undated) DEVICE — UNDERGLOVES BIOGEL PI SZ 7 LF

## (undated) DEVICE — TOWEL OR DISP STRL BLUE 4/PK

## (undated) DEVICE — BNDG COFLEX FOAM LF2 ST 6X5YD

## (undated) DEVICE — MANIFOLD 4 PORT

## (undated) DEVICE — INSERT CUSHIONPRONE VIEW LARGE

## (undated) DEVICE — SET TRI-LUMEN FILTERED TUBE

## (undated) DEVICE — DRESSING AQUACEL AG ADV 3.5X12

## (undated) DEVICE — DRESSING AQUACEL SACRAL 9 X 9

## (undated) DEVICE — DRAPE INVISISHIELD TOWEL SMALL

## (undated) DEVICE — DEVICE CLSR PDS 0 CT-1 12IN

## (undated) DEVICE — SYS PRINEO SKIN CLOSURE

## (undated) DEVICE — SUT STRATAFIX 0 PDS+ 22CM 9IN

## (undated) DEVICE — CAUTERY TIP POLISHER

## (undated) DEVICE — DRESSING COVER AQUACEL AG SURG

## (undated) DEVICE — ELECTRODE REM PLYHSV RETURN 9

## (undated) DEVICE — APPLICATOR CHLORAPREP ORN 26ML

## (undated) DEVICE — SYR 50ML CATH TIP

## (undated) DEVICE — NDL HYPO STD REG BVL 18GX1.5IN

## (undated) DEVICE — SOL BETADINE 5%

## (undated) DEVICE — PORT ACCESS 8MM W/120MM LOW

## (undated) DEVICE — IRRIGATOR ENDOSCOPY DISP.

## (undated) DEVICE — TRAY DO THE ROBOT

## (undated) DEVICE — BAG INZII TISS RETRV 12/15MM

## (undated) DEVICE — GOWN POLY REINF X-LONG 2XL

## (undated) DEVICE — GLOVE SIGNATURE ESSNTL LTX 8.5

## (undated) DEVICE — SOL NS 1000CC

## (undated) DEVICE — SCRUB 10% POVIDONE IODINE 4OZ

## (undated) DEVICE — SUT CTD VICRYL 1 UND BR CT

## (undated) DEVICE — SUT VICRYL+ 27 UR-6 VIOL

## (undated) DEVICE — Device

## (undated) DEVICE — SUT MCRYL PLUS 4-0 PS2 27IN

## (undated) DEVICE — KIT WING PAD POSITIONING

## (undated) DEVICE — UNDERGLOVE BIOGEL PI SZ 6.5 LF

## (undated) DEVICE — DRAPE INCISE IOBAN 2 23X17IN

## (undated) DEVICE — CLIPPER BLADE MOD 4406 (CAREF)

## (undated) DEVICE — SUT STRATAFIX 3-0 30CM

## (undated) DEVICE — SUT STRATAFIX PDS+ 1 CTX 24IN

## (undated) DEVICE — SUT 2/0 36IN COATED VICRYL

## (undated) DEVICE — TROCAR KII BLLN 12MM 10CM

## (undated) DEVICE — JELLY SURGILUBE 5GR

## (undated) DEVICE — GLOVE SIGNATURE ESSNTL LTX 8

## (undated) DEVICE — SUPPORT ULNA NERVE PROTECTOR

## (undated) DEVICE — DRAPE STERI INSTRUMENT 1018

## (undated) DEVICE — DRAPE INCISE IOBAN 2 23X23IN

## (undated) DEVICE — HOOD T7 W/ PEEL AWAY LENS

## (undated) DEVICE — DRAPE ARM DAVINCI XI